# Patient Record
Sex: FEMALE | Race: ASIAN | NOT HISPANIC OR LATINO
[De-identification: names, ages, dates, MRNs, and addresses within clinical notes are randomized per-mention and may not be internally consistent; named-entity substitution may affect disease eponyms.]

---

## 2017-01-03 ENCOUNTER — MESSAGE (OUTPATIENT)
Age: 42
End: 2017-01-03

## 2017-01-03 ENCOUNTER — APPOINTMENT (OUTPATIENT)
Dept: PULMONOLOGY | Facility: CLINIC | Age: 42
End: 2017-01-03

## 2017-01-03 ENCOUNTER — OTHER (OUTPATIENT)
Age: 42
End: 2017-01-03

## 2017-01-03 VITALS
WEIGHT: 96 LBS | DIASTOLIC BLOOD PRESSURE: 70 MMHG | BODY MASS INDEX: 18.85 KG/M2 | HEART RATE: 90 BPM | SYSTOLIC BLOOD PRESSURE: 100 MMHG | TEMPERATURE: 97.5 F | RESPIRATION RATE: 16 BRPM | HEIGHT: 60 IN

## 2017-01-04 ENCOUNTER — APPOINTMENT (OUTPATIENT)
Dept: INTERNAL MEDICINE | Facility: HOSPITAL | Age: 42
End: 2017-01-04

## 2017-01-05 ENCOUNTER — LABORATORY RESULT (OUTPATIENT)
Age: 42
End: 2017-01-05

## 2017-01-05 ENCOUNTER — APPOINTMENT (OUTPATIENT)
Dept: RHEUMATOLOGY | Facility: CLINIC | Age: 42
End: 2017-01-05

## 2017-01-09 ENCOUNTER — RESULT REVIEW (OUTPATIENT)
Age: 42
End: 2017-01-09

## 2017-01-10 ENCOUNTER — APPOINTMENT (OUTPATIENT)
Dept: RHEUMATOLOGY | Facility: CLINIC | Age: 42
End: 2017-01-10

## 2017-01-10 VITALS
OXYGEN SATURATION: 95 % | DIASTOLIC BLOOD PRESSURE: 72 MMHG | SYSTOLIC BLOOD PRESSURE: 115 MMHG | HEART RATE: 79 BPM | BODY MASS INDEX: 18.65 KG/M2 | HEIGHT: 60 IN | TEMPERATURE: 97.5 F | WEIGHT: 95 LBS

## 2017-01-11 ENCOUNTER — APPOINTMENT (OUTPATIENT)
Dept: DERMATOLOGY | Facility: CLINIC | Age: 42
End: 2017-01-11

## 2017-01-18 ENCOUNTER — APPOINTMENT (OUTPATIENT)
Dept: CARDIOLOGY | Facility: CLINIC | Age: 42
End: 2017-01-18

## 2017-01-18 ENCOUNTER — APPOINTMENT (OUTPATIENT)
Dept: PULMONOLOGY | Facility: CLINIC | Age: 42
End: 2017-01-18

## 2017-01-18 VITALS
BODY MASS INDEX: 19.24 KG/M2 | HEIGHT: 60 IN | HEART RATE: 98 BPM | OXYGEN SATURATION: 100 % | WEIGHT: 98 LBS | TEMPERATURE: 97.4 F | DIASTOLIC BLOOD PRESSURE: 69 MMHG | SYSTOLIC BLOOD PRESSURE: 113 MMHG

## 2017-01-22 LAB
25(OH)D3 SERPL-MCNC: 9.4 NG/ML
ALBUMIN SERPL ELPH-MCNC: 4.1 G/DL
ALP BLD-CCNC: 75 U/L
ALT SERPL-CCNC: 40 U/L
ANION GAP SERPL CALC-SCNC: 11 MMOL/L
APPEARANCE: CLEAR
AST SERPL-CCNC: 45 U/L
BACTERIA: ABNORMAL
BASOPHILS # BLD AUTO: 0.03 K/UL
BASOPHILS NFR BLD AUTO: 0.5 %
BILIRUB SERPL-MCNC: 0.5 MG/DL
BILIRUBIN URINE: NEGATIVE
BLOOD URINE: NEGATIVE
BUN SERPL-MCNC: 7 MG/DL
C3 SERPL-MCNC: 92 MG/DL
C4 SERPL-MCNC: 22 MG/DL
CALCIUM SERPL-MCNC: 9 MG/DL
CHLORIDE SERPL-SCNC: 102 MMOL/L
CO2 SERPL-SCNC: 28 MMOL/L
COLOR: YELLOW
CREAT SERPL-MCNC: 0.41 MG/DL
DSDNA AB SER-ACNC: <12 IU/ML
EOSINOPHIL # BLD AUTO: 0.26 K/UL
EOSINOPHIL NFR BLD AUTO: 4 %
ERYTHROCYTE [SEDIMENTATION RATE] IN BLOOD BY WESTERGREN METHOD: 10 MM/HR
GGT SERPL-CCNC: 13 U/L
GLUCOSE QUALITATIVE U: NORMAL MG/DL
GLUCOSE SERPL-MCNC: 88 MG/DL
HCT VFR BLD CALC: 38.5 %
HGB BLD-MCNC: 12.1 G/DL
HYALINE CASTS: 1 /LPF
IMM GRANULOCYTES NFR BLD AUTO: 0.3 %
KETONES URINE: NEGATIVE
LEUKOCYTE ESTERASE URINE: NEGATIVE
LYMPHOCYTES # BLD AUTO: 1.4 K/UL
LYMPHOCYTES NFR BLD AUTO: 21.6 %
MAN DIFF?: NORMAL
MCHC RBC-ENTMCNC: 26.1 PG
MCHC RBC-ENTMCNC: 31.4 GM/DL
MCV RBC AUTO: 83 FL
MICROSCOPIC-UA: NORMAL
MONOCYTES # BLD AUTO: 0.88 K/UL
MONOCYTES NFR BLD AUTO: 13.6 %
NEUTROPHILS # BLD AUTO: 3.88 K/UL
NEUTROPHILS NFR BLD AUTO: 60 %
NITRITE URINE: NEGATIVE
PH URINE: 7
PLATELET # BLD AUTO: 163 K/UL
POTASSIUM SERPL-SCNC: 3.9 MMOL/L
PROT SERPL-MCNC: 7.1 G/DL
PROTEIN URINE: NEGATIVE MG/DL
RBC # BLD: 4.64 M/UL
RBC # FLD: 16.1 %
RED BLOOD CELLS URINE: 4 /HPF
SODIUM SERPL-SCNC: 141 MMOL/L
SPECIFIC GRAVITY URINE: 1.01
SQUAMOUS EPITHELIAL CELLS: 3 /HPF
UROBILINOGEN URINE: NORMAL MG/DL
WBC # FLD AUTO: 6.47 K/UL
WHITE BLOOD CELLS URINE: 1 /HPF

## 2017-01-25 LAB
CK BB SERPL ELPH-CCNC: 0 % (ref 0–?)
CK MB CFR SERPL ELPH: 18 %
CK MM SERPL ELPH-CCNC: 73 %
CREATINE KINASE,TOTAL,SERUM: 496 U/L
MACRO TYPE 1: 9 %
MACRO TYPE 2: 0 %

## 2017-02-02 ENCOUNTER — LABORATORY RESULT (OUTPATIENT)
Age: 42
End: 2017-02-02

## 2017-02-02 ENCOUNTER — APPOINTMENT (OUTPATIENT)
Dept: RHEUMATOLOGY | Facility: CLINIC | Age: 42
End: 2017-02-02

## 2017-02-07 ENCOUNTER — APPOINTMENT (OUTPATIENT)
Dept: INFECTIOUS DISEASE | Facility: CLINIC | Age: 42
End: 2017-02-07

## 2017-02-08 ENCOUNTER — OUTPATIENT (OUTPATIENT)
Dept: OUTPATIENT SERVICES | Facility: HOSPITAL | Age: 42
LOS: 1 days | End: 2017-02-08

## 2017-02-08 ENCOUNTER — APPOINTMENT (OUTPATIENT)
Dept: INTERNAL MEDICINE | Facility: HOSPITAL | Age: 42
End: 2017-02-08

## 2017-02-08 VITALS
DIASTOLIC BLOOD PRESSURE: 63 MMHG | HEART RATE: 92 BPM | WEIGHT: 97 LBS | HEIGHT: 61 IN | BODY MASS INDEX: 18.31 KG/M2 | SYSTOLIC BLOOD PRESSURE: 110 MMHG

## 2017-02-08 VITALS — SYSTOLIC BLOOD PRESSURE: 88 MMHG | DIASTOLIC BLOOD PRESSURE: 58 MMHG

## 2017-02-08 DIAGNOSIS — M25.50 PAIN IN UNSPECIFIED JOINT: ICD-10-CM

## 2017-02-08 DIAGNOSIS — Z98.89 OTHER SPECIFIED POSTPROCEDURAL STATES: Chronic | ICD-10-CM

## 2017-02-09 NOTE — PHYSICAL EXAM
[No Acute Distress] : no acute distress [No Respiratory Distress] : no respiratory distress [Clear to Auscultation] : lungs were clear to auscultation bilaterally [Normal Rate] : heart rate was normal  [Normal S1, S2] : normal S1 and S2 [No Murmurs] : no murmurs heard [No Edema] : there was no peripheral edema

## 2017-02-10 DIAGNOSIS — M34.9 SYSTEMIC SCLEROSIS, UNSPECIFIED: ICD-10-CM

## 2017-02-10 DIAGNOSIS — R76.11 NONSPECIFIC REACTION TO TUBERCULIN SKIN TEST WITHOUT ACTIVE TUBERCULOSIS: ICD-10-CM

## 2017-02-10 DIAGNOSIS — E03.9 HYPOTHYROIDISM, UNSPECIFIED: ICD-10-CM

## 2017-02-10 DIAGNOSIS — R00.2 PALPITATIONS: ICD-10-CM

## 2017-02-10 NOTE — COUNSELING
[Non - Smoker] : non-smoker [FreeTextEntry2] : Advised light activity such as walking for a few minutes at a time.

## 2017-02-10 NOTE — ASSESSMENT
[FreeTextEntry1] : 42 yo female with scleroderma, hypothyroidism and palpitations presents for follow up of palpitations and other active issues in regards to scleroderma and latent tuberculosis.\par \par Palpitations: intermittent persistence of symptoms but denying any loss of consciousness, no chest pains/ pressure at present.  Will continue to monitor, reviewed with pt that at present don't think there is any relationship to her elevated CPK found during hospitalization.  Advised to patient to measure her pulse when palpitations recur so that she can see for herself how fast her heart is beating.  Also advised pt that unless she is symptomatic, such as severe dizziness or chest pains or jaw pain, left arm pain, nausea and vomiting with palpitations at present there is no indication to go to the ER.  Pt suggested she understood.  Will obtain TSH for evaluation of her thyroid function as she is on levothyroxine and if over supplemented could contribute to palpitations.  Will also repeat lipid panel as pt wants to know current status.  Pt unable to obtain labs today so lab requistion given to pt and orders placed on deferral for her to have drawn during her next set of labs with chemo.\par \par Hypothyroidism: recheck tsh, continue current regimen.\par \par Scleroderma: as per Rheum, stable at present.  Although concern in regards to swallowing problems in October, reviewed speech note which revealed that  swallow test done was incomplete and recommendations to possibly follow up with GI for further testing, will discuss this with patient at next visit, as not discussed at this visit.\par \par Latent tuberculosis: recent lfts stabilizing, still slightly elevated, defer to ID for further managemnt.  \par \par Will have pt follow up in a few weeks to touch base.  Pt also concerned about her lack of weight gain, stating that it has stabilized and she is no longer losing weight but she would prefer to gain.  Educated pt in regards to increasing good fats in her diet in form of nuts, avocado and seafood, which pt states she is already doing so.

## 2017-02-10 NOTE — HISTORY OF PRESENT ILLNESS
[Intercurrent Specialty/Sub-specialty Visits] : the patient has intercurrent specialty/sub-specialty visits [de-identified] : Pt is here for follow up  on several issues: palpitations, update on scleroderma and follow up on her hospital admission in October.\par \par Pt states that last night she had an episode of palpitations that was slightly similar to the one she had in October when she was admitted for evaluation of a possible myocarditis.  She is concerned and wonders if she had to go to the ER last night.  SHe is also wondering about her lab results from the Cardiologist and Rheumatologist.\par \par Pt states that in October she had experienced palpitations about 2 days prior to her hospitalization. On day of hospitalization, she was choking on a pill that she was swallowing which caused her to go to ER.  She does admit that she felt chest pressure that she didn't think was the same thing as chest pain mentioned in her discharge summary.  She was admitted to the hospital for further workup.\par \par  She reports that since the hospitalization she has only experiencing the palpitations at first with first 2 episodes of chemotherapy for very brief periods but certainly not the same as before.  Then it seemed to dissipate and did not return until last night when she noticed it came on after her dinner about 10 minutes later. Pt decided to drink some water and went to lay down due to the palpitations.  About 15 minutes later she experienced some heartburn.\par \par No sob, chest tightness or pain, but did feel a little "low" or "lightheaded".  She didn't pass out and the room wasn't spinning around her.  Pt did feel a little nausea.  \par \par Pt feeling better today.  No palpitations today.   [FreeTextEntry3] : Cardiology, Rheumatology, Pulmonary, ID and Center(speech) [de-identified] : At her  Cardiology visit:  Cardiologist noted the following: She is a 41-year-old with scleroderma and recently elevated cardiac enzymes and normal ejection fraction and normal coronary angiogram. Her cardiac MRI showed no evidence of prior infarction or inflammation.  He reports reassuring pt that the muscle enzyme leak is not coming from her heart. He obtained labs for CPK isoenzyme electrophoresis and refilled her nifedipine.  He mentioned that he wanted her to follow up in 6 months for routine cardiac evaluation of her issues, or sooner if symptoms arise.\par \par At Rheum visit: Rheumatologist noted the followin. 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  She is on HCQ and on Cytoxan IV since 2016 ( received 3 doses )\par 2. + Quantiferon - had hepatotoxicity from INH- on hold, and she is to  c/w Nifedipine XR 30 mg\par \par Noted ID's visit advised pt continue to stay off INH for latent TB, and there will be considerations for starting an alternative medication once lfts stabilize, pt has a follow up appt in a month.\par \par \par

## 2017-02-10 NOTE — REVIEW OF SYSTEMS
[Palpitations] : palpitations [Chest Pain] : no chest pain [Lower Ext Edema] : no lower extremity edema [FreeTextEntry5] : as noted in HPI as well.

## 2017-02-15 ENCOUNTER — LABORATORY RESULT (OUTPATIENT)
Age: 42
End: 2017-02-15

## 2017-02-16 LAB
ALBUMIN SERPL ELPH-MCNC: 3.8 G/DL
ALP BLD-CCNC: 65 U/L
ALT SERPL-CCNC: 29 U/L
ANION GAP SERPL CALC-SCNC: 14 MMOL/L
APPEARANCE: CLEAR
AST SERPL-CCNC: 39 U/L
BASOPHILS # BLD AUTO: 0.03 K/UL
BASOPHILS NFR BLD AUTO: 0.4 %
BILIRUB SERPL-MCNC: 0.6 MG/DL
BILIRUBIN URINE: NEGATIVE
BLOOD URINE: ABNORMAL
BUN SERPL-MCNC: 11 MG/DL
CALCIUM SERPL-MCNC: 9.1 MG/DL
CHLORIDE SERPL-SCNC: 100 MMOL/L
CO2 SERPL-SCNC: 23 MMOL/L
COLOR: YELLOW
CREAT SERPL-MCNC: 0.38 MG/DL
EOSINOPHIL # BLD AUTO: 0.24 K/UL
EOSINOPHIL NFR BLD AUTO: 3.5 %
GLUCOSE QUALITATIVE U: NORMAL MG/DL
GLUCOSE SERPL-MCNC: 66 MG/DL
HCT VFR BLD CALC: 40.6 %
HGB BLD-MCNC: 12.8 G/DL
IMM GRANULOCYTES NFR BLD AUTO: 0.4 %
KETONES URINE: NEGATIVE
LEUKOCYTE ESTERASE URINE: NEGATIVE
LYMPHOCYTES # BLD AUTO: 1.69 K/UL
LYMPHOCYTES NFR BLD AUTO: 24.4 %
MAN DIFF?: NORMAL
MCHC RBC-ENTMCNC: 26 PG
MCHC RBC-ENTMCNC: 31.5 GM/DL
MCV RBC AUTO: 82.5 FL
MONOCYTES # BLD AUTO: 0.71 K/UL
MONOCYTES NFR BLD AUTO: 10.2 %
NEUTROPHILS # BLD AUTO: 4.23 K/UL
NEUTROPHILS NFR BLD AUTO: 61.1 %
NITRITE URINE: NEGATIVE
PH URINE: 5.5
PLATELET # BLD AUTO: 179 K/UL
POTASSIUM SERPL-SCNC: 3.8 MMOL/L
PROT SERPL-MCNC: 7.3 G/DL
PROTEIN URINE: NEGATIVE MG/DL
RBC # BLD: 4.92 M/UL
RBC # FLD: 15.8 %
SODIUM SERPL-SCNC: 137 MMOL/L
SPECIFIC GRAVITY URINE: 1.01
UROBILINOGEN URINE: NORMAL MG/DL
WBC # FLD AUTO: 6.93 K/UL

## 2017-03-02 ENCOUNTER — MOBILE ON CALL (OUTPATIENT)
Age: 42
End: 2017-03-02

## 2017-03-02 LAB
ALBUMIN SERPL ELPH-MCNC: 4 G/DL
ALP BLD-CCNC: 82 U/L
ALT SERPL-CCNC: 31 U/L
ANION GAP SERPL CALC-SCNC: 10 MMOL/L
APPEARANCE: CLEAR
AST SERPL-CCNC: 45 U/L
BACTERIA: NEGATIVE
BASOPHILS # BLD AUTO: 0.02 K/UL
BASOPHILS NFR BLD AUTO: 0.3 %
BILIRUB SERPL-MCNC: 0.3 MG/DL
BILIRUBIN URINE: NEGATIVE
BLOOD URINE: ABNORMAL
BUN SERPL-MCNC: 15 MG/DL
CALCIUM SERPL-MCNC: 9.1 MG/DL
CHLORIDE SERPL-SCNC: 103 MMOL/L
CO2 SERPL-SCNC: 26 MMOL/L
COLOR: YELLOW
CREAT SERPL-MCNC: 0.42 MG/DL
CREAT SPEC-SCNC: 60 MG/DL
CREAT/PROT UR: 0.1 RATIO
EOSINOPHIL # BLD AUTO: 0.22 K/UL
EOSINOPHIL NFR BLD AUTO: 3 %
GLUCOSE QUALITATIVE U: NORMAL MG/DL
GLUCOSE SERPL-MCNC: 82 MG/DL
HCT VFR BLD CALC: 38.4 %
HGB BLD-MCNC: 12.3 G/DL
HYALINE CASTS: 5 /LPF
IMM GRANULOCYTES NFR BLD AUTO: 0.3 %
KETONES URINE: NEGATIVE
LEUKOCYTE ESTERASE URINE: NEGATIVE
LYMPHOCYTES # BLD AUTO: 1.71 K/UL
LYMPHOCYTES NFR BLD AUTO: 23.4 %
MAN DIFF?: NORMAL
MCHC RBC-ENTMCNC: 26.3 PG
MCHC RBC-ENTMCNC: 32 GM/DL
MCV RBC AUTO: 82.2 FL
MICROSCOPIC-UA: NORMAL
MONOCYTES # BLD AUTO: 0.91 K/UL
MONOCYTES NFR BLD AUTO: 12.5 %
NEUTROPHILS # BLD AUTO: 4.42 K/UL
NEUTROPHILS NFR BLD AUTO: 60.5 %
NITRITE URINE: NEGATIVE
PH URINE: 5.5
PLATELET # BLD AUTO: 168 K/UL
POTASSIUM SERPL-SCNC: 4.6 MMOL/L
PROT SERPL-MCNC: 7.3 G/DL
PROT UR-MCNC: 8 MG/DL
PROTEIN URINE: NEGATIVE MG/DL
RBC # BLD: 4.67 M/UL
RBC # FLD: 15.7 %
RED BLOOD CELLS URINE: 4 /HPF
SODIUM SERPL-SCNC: 139 MMOL/L
SPECIFIC GRAVITY URINE: 1.02
SQUAMOUS EPITHELIAL CELLS: 8 /HPF
UROBILINOGEN URINE: NORMAL MG/DL
WBC # FLD AUTO: 7.3 K/UL
WHITE BLOOD CELLS URINE: 4 /HPF

## 2017-03-03 ENCOUNTER — OUTPATIENT (OUTPATIENT)
Dept: OUTPATIENT SERVICES | Facility: HOSPITAL | Age: 42
LOS: 1 days | End: 2017-03-03

## 2017-03-03 ENCOUNTER — APPOINTMENT (OUTPATIENT)
Dept: INTERNAL MEDICINE | Facility: HOSPITAL | Age: 42
End: 2017-03-03

## 2017-03-03 VITALS — SYSTOLIC BLOOD PRESSURE: 116 MMHG | HEART RATE: 81 BPM | DIASTOLIC BLOOD PRESSURE: 68 MMHG

## 2017-03-03 VITALS — BODY MASS INDEX: 18.5 KG/M2 | HEIGHT: 61 IN | WEIGHT: 98 LBS

## 2017-03-03 DIAGNOSIS — H11.32 CONJUNCTIVAL HEMORRHAGE, LEFT EYE: ICD-10-CM

## 2017-03-03 DIAGNOSIS — Z98.89 OTHER SPECIFIED POSTPROCEDURAL STATES: Chronic | ICD-10-CM

## 2017-03-06 DIAGNOSIS — L89.90 PRESSURE ULCER OF UNSPECIFIED SITE, UNSPECIFIED STAGE: ICD-10-CM

## 2017-03-06 DIAGNOSIS — H11.32 CONJUNCTIVAL HEMORRHAGE, LEFT EYE: ICD-10-CM

## 2017-03-07 ENCOUNTER — LABORATORY RESULT (OUTPATIENT)
Age: 42
End: 2017-03-07

## 2017-03-07 ENCOUNTER — APPOINTMENT (OUTPATIENT)
Dept: INFECTIOUS DISEASE | Facility: CLINIC | Age: 42
End: 2017-03-07

## 2017-03-07 ENCOUNTER — APPOINTMENT (OUTPATIENT)
Dept: RHEUMATOLOGY | Facility: CLINIC | Age: 42
End: 2017-03-07

## 2017-03-07 ENCOUNTER — OUTPATIENT (OUTPATIENT)
Dept: OUTPATIENT SERVICES | Facility: HOSPITAL | Age: 42
LOS: 1 days | End: 2017-03-07
Payer: COMMERCIAL

## 2017-03-07 VITALS
HEART RATE: 88 BPM | OXYGEN SATURATION: 100 % | TEMPERATURE: 97.9 F | HEIGHT: 61 IN | SYSTOLIC BLOOD PRESSURE: 114 MMHG | BODY MASS INDEX: 18.88 KG/M2 | DIASTOLIC BLOOD PRESSURE: 73 MMHG | WEIGHT: 100 LBS

## 2017-03-07 VITALS
WEIGHT: 99 LBS | BODY MASS INDEX: 18.69 KG/M2 | OXYGEN SATURATION: 98 % | TEMPERATURE: 98.1 F | HEIGHT: 61 IN | HEART RATE: 98 BPM | SYSTOLIC BLOOD PRESSURE: 120 MMHG | DIASTOLIC BLOOD PRESSURE: 77 MMHG

## 2017-03-07 DIAGNOSIS — B97.89 OTHER VIRAL AGENTS AS THE CAUSE OF DISEASES CLASSIFIED ELSEWHERE: ICD-10-CM

## 2017-03-07 DIAGNOSIS — R91.1 SOLITARY PULMONARY NODULE: ICD-10-CM

## 2017-03-07 DIAGNOSIS — I51.89 OTHER ILL-DEFINED HEART DISEASES: ICD-10-CM

## 2017-03-07 DIAGNOSIS — Z98.89 OTHER SPECIFIED POSTPROCEDURAL STATES: Chronic | ICD-10-CM

## 2017-03-07 LAB
ALBUMIN SERPL ELPH-MCNC: 3.9 G/DL — SIGNIFICANT CHANGE UP (ref 3.3–5)
ALP SERPL-CCNC: 75 U/L — SIGNIFICANT CHANGE UP (ref 40–120)
ALT FLD-CCNC: 23 U/L — SIGNIFICANT CHANGE UP (ref 10–45)
ANION GAP SERPL CALC-SCNC: 14 MMOL/L — SIGNIFICANT CHANGE UP (ref 5–17)
AST SERPL-CCNC: 34 U/L — SIGNIFICANT CHANGE UP (ref 10–40)
BILIRUB SERPL-MCNC: 0.4 MG/DL — SIGNIFICANT CHANGE UP (ref 0.2–1.2)
BUN SERPL-MCNC: 10 MG/DL — SIGNIFICANT CHANGE UP (ref 7–23)
CALCIUM SERPL-MCNC: 9.5 MG/DL — SIGNIFICANT CHANGE UP (ref 8.4–10.5)
CHLORIDE SERPL-SCNC: 101 MMOL/L — SIGNIFICANT CHANGE UP (ref 96–108)
CO2 SERPL-SCNC: 23 MMOL/L — SIGNIFICANT CHANGE UP (ref 22–31)
CREAT SERPL-MCNC: 0.39 MG/DL — LOW (ref 0.5–1.3)
GLUCOSE SERPL-MCNC: 78 MG/DL — SIGNIFICANT CHANGE UP (ref 70–99)
HCT VFR BLD CALC: 39.9 % — SIGNIFICANT CHANGE UP (ref 34.5–45)
HGB BLD-MCNC: 12.1 G/DL — SIGNIFICANT CHANGE UP (ref 11.5–15.5)
MCHC RBC-ENTMCNC: 25.3 PG — LOW (ref 27–34)
MCHC RBC-ENTMCNC: 30.3 GM/DL — LOW (ref 32–36)
MCV RBC AUTO: 83.3 FL — SIGNIFICANT CHANGE UP (ref 80–100)
PLATELET # BLD AUTO: 171 K/UL — SIGNIFICANT CHANGE UP (ref 150–400)
POTASSIUM SERPL-MCNC: 4 MMOL/L — SIGNIFICANT CHANGE UP (ref 3.5–5.3)
POTASSIUM SERPL-SCNC: 4 MMOL/L — SIGNIFICANT CHANGE UP (ref 3.5–5.3)
PROT SERPL-MCNC: 7.6 G/DL — SIGNIFICANT CHANGE UP (ref 6–8.3)
RBC # BLD: 4.79 M/UL — SIGNIFICANT CHANGE UP (ref 3.8–5.2)
RBC # FLD: 15.7 % — HIGH (ref 10.3–14.5)
SODIUM SERPL-SCNC: 138 MMOL/L — SIGNIFICANT CHANGE UP (ref 135–145)
WBC # BLD: 6.78 K/UL — SIGNIFICANT CHANGE UP (ref 3.8–10.5)
WBC # FLD AUTO: 6.78 K/UL — SIGNIFICANT CHANGE UP (ref 3.8–10.5)

## 2017-03-07 PROCEDURE — 36415 COLL VENOUS BLD VENIPUNCTURE: CPT

## 2017-03-07 PROCEDURE — G0463: CPT

## 2017-03-07 PROCEDURE — 85027 COMPLETE CBC AUTOMATED: CPT

## 2017-03-07 PROCEDURE — 80053 COMPREHEN METABOLIC PANEL: CPT

## 2017-03-15 DIAGNOSIS — R76.11 NONSPECIFIC REACTION TO TUBERCULIN SKIN TEST WITHOUT ACTIVE TUBERCULOSIS: ICD-10-CM

## 2017-03-22 ENCOUNTER — FORM ENCOUNTER (OUTPATIENT)
Age: 42
End: 2017-03-22

## 2017-03-22 ENCOUNTER — APPOINTMENT (OUTPATIENT)
Dept: INTERNAL MEDICINE | Facility: HOSPITAL | Age: 42
End: 2017-03-22

## 2017-03-22 ENCOUNTER — LABORATORY RESULT (OUTPATIENT)
Age: 42
End: 2017-03-22

## 2017-03-22 ENCOUNTER — OUTPATIENT (OUTPATIENT)
Dept: OUTPATIENT SERVICES | Facility: HOSPITAL | Age: 42
LOS: 1 days | End: 2017-03-22

## 2017-03-22 VITALS
DIASTOLIC BLOOD PRESSURE: 62 MMHG | WEIGHT: 100 LBS | BODY MASS INDEX: 18.88 KG/M2 | HEIGHT: 61 IN | HEART RATE: 89 BPM | SYSTOLIC BLOOD PRESSURE: 112 MMHG

## 2017-03-22 VITALS — SYSTOLIC BLOOD PRESSURE: 88 MMHG | DIASTOLIC BLOOD PRESSURE: 58 MMHG

## 2017-03-22 DIAGNOSIS — Z98.89 OTHER SPECIFIED POSTPROCEDURAL STATES: Chronic | ICD-10-CM

## 2017-03-22 DIAGNOSIS — N92.6 IRREGULAR MENSTRUATION, UNSPECIFIED: ICD-10-CM

## 2017-03-22 DIAGNOSIS — Z87.898 PERSONAL HISTORY OF OTHER SPECIFIED CONDITIONS: ICD-10-CM

## 2017-03-22 LAB
HCG UR QL: NEGATIVE
QUALITY CONTROL: YES
TSH SERPL-MCNC: 0.09 UIU/ML — LOW (ref 0.27–4.2)

## 2017-03-22 NOTE — HISTORY OF PRESENT ILLNESS
[FreeTextEntry1] : Missed menses and Needs referral to Ophthalmology, breast clinic and dermatology, following up on her fall and scleroderma.\par \par  [de-identified] : Pt presents for follow up active issues of a recent fall and scleroderma.  Pt has been doing much better since last visit a few weeks back stating that her bruises are improving markedly.  She reports that if she presses on the corner of her left eyeball she feels a little bit of pain.  The red spot present at last visit is completely gone.  Pt denies any change to her vision and is due for a visit with her Ophthalmology as she missed her previously scheduled ones due to chemotherapy treatment of her scleroderma.\par \par She is also here because she is noticing that her last menses was on 18th of February and yet this month she has yet to get her menses.  She is wondering why that is the case.  She reports that her  is using a condom every time they have intercourse and there has not been any break in it that she is aware.  She denies ever being late with her menses in the past.  She currently has 2 children at home and is concerned about having another pregnancy.  \par \par She is also in need of a referral to the breast clinic as well, due to missing that visit in order to get her chemotherapy, she did not have the chance to follow up for her 6 month repeat mammogram and breast ultrasound as indicated in the mammogram report.  \par \par Otherwise she is doing well for the most part.  Also wonders about her thyroid level, would like to have it checked today.\par \par

## 2017-03-22 NOTE — DATA REVIEWED
[FreeTextEntry1] : Mammogram done in June, 2016: Bi-RAds 3- Probably benign findings, Dense breasts.  Also noted possible hypoechoic and anechoic lesions versus cluster of cystic debris on US of left breast for which follow up left breast us recommended to assess stability..

## 2017-03-22 NOTE — ASSESSMENT
[FreeTextEntry1] : 40 yo female with Scleroderma, Interstitial lung disease, latent tuberculosis and hypothyroidism presents for follow up these issues and looking for referrals.\par \par Bruise s/p fall: resolved, still with a little pain at eye ball, doesn't appear to be bony tenderness, hence will defer on imaging at present.  Refer to Ophthalomolgy for follow up.\par \par Amenorrhea: will obtain UCG result and inform patient of results.  If positive will consider referral to family planning for assistance with next course of action.  Suspect that it is negative as amenorrhea can be secondary to her medication.\par \par Hypothyroidism: will check TSH today and depending on results will adjust appropriately thyroid medicaiton, will inform patient through the [facility] portal, pt agrees with plan.  \par \par Scleroderma: stable on chemotherapy by Rheum.\par \par Abnormal mammogram: reviewed report with patient, noted need for repeat mammogram and breast ultrasound, both ordered today, pt to call to schedule.  Referred  to Breast clinic as well. \par \par Pt to follow up with me in 6 months or sooner, she is encouraged to call office if needs earlier appointment.  \par \par

## 2017-03-22 NOTE — HEALTH RISK ASSESSMENT
[Active tobbaco user] : Patient is not a tobacco user [de-identified] : Rheumatology and Infectious Disease

## 2017-03-23 ENCOUNTER — OUTPATIENT (OUTPATIENT)
Dept: OUTPATIENT SERVICES | Facility: HOSPITAL | Age: 42
LOS: 1 days | End: 2017-03-23
Payer: COMMERCIAL

## 2017-03-23 ENCOUNTER — APPOINTMENT (OUTPATIENT)
Dept: CT IMAGING | Facility: CLINIC | Age: 42
End: 2017-03-23

## 2017-03-23 ENCOUNTER — TRANSCRIPTION ENCOUNTER (OUTPATIENT)
Age: 42
End: 2017-03-23

## 2017-03-23 DIAGNOSIS — M34.9 SYSTEMIC SCLEROSIS, UNSPECIFIED: ICD-10-CM

## 2017-03-23 DIAGNOSIS — Z98.89 OTHER SPECIFIED POSTPROCEDURAL STATES: Chronic | ICD-10-CM

## 2017-03-23 DIAGNOSIS — R92.8 OTHER ABNORMAL AND INCONCLUSIVE FINDINGS ON DIAGNOSTIC IMAGING OF BREAST: ICD-10-CM

## 2017-03-23 DIAGNOSIS — R91.1 SOLITARY PULMONARY NODULE: ICD-10-CM

## 2017-03-23 DIAGNOSIS — N92.6 IRREGULAR MENSTRUATION, UNSPECIFIED: ICD-10-CM

## 2017-03-23 DIAGNOSIS — E03.9 HYPOTHYROIDISM, UNSPECIFIED: ICD-10-CM

## 2017-03-23 DIAGNOSIS — R59.0 LOCALIZED ENLARGED LYMPH NODES: ICD-10-CM

## 2017-03-23 PROCEDURE — 71250 CT THORAX DX C-: CPT

## 2017-03-30 ENCOUNTER — APPOINTMENT (OUTPATIENT)
Dept: RHEUMATOLOGY | Facility: CLINIC | Age: 42
End: 2017-03-30

## 2017-04-03 ENCOUNTER — APPOINTMENT (OUTPATIENT)
Dept: PULMONOLOGY | Facility: CLINIC | Age: 42
End: 2017-04-03

## 2017-04-03 VITALS
SYSTOLIC BLOOD PRESSURE: 104 MMHG | WEIGHT: 102 LBS | HEART RATE: 83 BPM | DIASTOLIC BLOOD PRESSURE: 60 MMHG | TEMPERATURE: 97.8 F | RESPIRATION RATE: 16 BRPM | BODY MASS INDEX: 19.26 KG/M2 | HEIGHT: 61 IN

## 2017-04-14 ENCOUNTER — APPOINTMENT (OUTPATIENT)
Dept: RHEUMATOLOGY | Facility: CLINIC | Age: 42
End: 2017-04-14

## 2017-04-14 VITALS
TEMPERATURE: 98.2 F | BODY MASS INDEX: 19.07 KG/M2 | DIASTOLIC BLOOD PRESSURE: 75 MMHG | HEART RATE: 97 BPM | HEIGHT: 61 IN | SYSTOLIC BLOOD PRESSURE: 112 MMHG | WEIGHT: 101 LBS

## 2017-04-18 LAB
ALBUMIN SERPL ELPH-MCNC: 4 G/DL
ALP BLD-CCNC: 82 U/L
ALT SERPL-CCNC: 32 U/L
ANA PAT FLD IF-IMP: ABNORMAL
ANA PATTERN: NORMAL
ANA SER IF-ACNC: ABNORMAL
ANA TITER: ABNORMAL
ANION GAP SERPL CALC-SCNC: 14 MMOL/L
AST SERPL-CCNC: 46 U/L
BASOPHILS # BLD AUTO: 0.02 K/UL
BASOPHILS NFR BLD AUTO: 0.2 %
BILIRUB SERPL-MCNC: 0.3 MG/DL
BUN SERPL-MCNC: 14 MG/DL
CALCIUM SERPL-MCNC: 9.3 MG/DL
CHLORIDE SERPL-SCNC: 101 MMOL/L
CK SERPL-CCNC: 328 U/L
CO2 SERPL-SCNC: 25 MMOL/L
CREAT SERPL-MCNC: 0.43 MG/DL
CRP SERPL-MCNC: <0.2 MG/DL
ENA JO1 AB SER IA-ACNC: <0.2 AL
EOSINOPHIL # BLD AUTO: 0.19 K/UL
EOSINOPHIL NFR BLD AUTO: 1.6 %
ERYTHROCYTE [SEDIMENTATION RATE] IN BLOOD BY WESTERGREN METHOD: 14 MM/HR
GLUCOSE SERPL-MCNC: 79 MG/DL
HCT VFR BLD CALC: 42.6 %
HGB BLD-MCNC: 13.2 G/DL
IMM GRANULOCYTES NFR BLD AUTO: 0.3 %
LDH SERPL-CCNC: 294 U/L
LYMPHOCYTES # BLD AUTO: 1.77 K/UL
LYMPHOCYTES NFR BLD AUTO: 15 %
MAN DIFF?: NORMAL
MCHC RBC-ENTMCNC: 25.5 PG
MCHC RBC-ENTMCNC: 31 GM/DL
MCV RBC AUTO: 82.4 FL
MONOCYTES # BLD AUTO: 1.35 K/UL
MONOCYTES NFR BLD AUTO: 11.4 %
MYOGLOBIN SERPL-MCNC: 126 NG/ML
NEUTROPHILS # BLD AUTO: 8.45 K/UL
NEUTROPHILS NFR BLD AUTO: 71.5 %
PLATELET # BLD AUTO: 205 K/UL
POTASSIUM SERPL-SCNC: 4.3 MMOL/L
PROT SERPL-MCNC: 7.2 G/DL
RBC # BLD: 5.17 M/UL
RBC # FLD: 15.6 %
SODIUM SERPL-SCNC: 140 MMOL/L
WBC # FLD AUTO: 11.81 K/UL

## 2017-04-21 ENCOUNTER — APPOINTMENT (OUTPATIENT)
Dept: DERMATOLOGY | Facility: HOSPITAL | Age: 42
End: 2017-04-21

## 2017-04-24 ENCOUNTER — OUTPATIENT (OUTPATIENT)
Dept: OUTPATIENT SERVICES | Facility: HOSPITAL | Age: 42
LOS: 1 days | End: 2017-04-24
Payer: COMMERCIAL

## 2017-04-24 ENCOUNTER — APPOINTMENT (OUTPATIENT)
Dept: INFECTIOUS DISEASE | Facility: CLINIC | Age: 42
End: 2017-04-24

## 2017-04-24 VITALS
BODY MASS INDEX: 19.26 KG/M2 | SYSTOLIC BLOOD PRESSURE: 107 MMHG | DIASTOLIC BLOOD PRESSURE: 68 MMHG | WEIGHT: 102 LBS | HEIGHT: 61 IN | OXYGEN SATURATION: 100 % | HEART RATE: 88 BPM | TEMPERATURE: 97.4 F

## 2017-04-24 DIAGNOSIS — Z98.89 OTHER SPECIFIED POSTPROCEDURAL STATES: Chronic | ICD-10-CM

## 2017-04-24 DIAGNOSIS — B97.89 OTHER VIRAL AGENTS AS THE CAUSE OF DISEASES CLASSIFIED ELSEWHERE: ICD-10-CM

## 2017-04-24 PROCEDURE — G0463: CPT

## 2017-04-27 DIAGNOSIS — R76.11 NONSPECIFIC REACTION TO TUBERCULIN SKIN TEST WITHOUT ACTIVE TUBERCULOSIS: ICD-10-CM

## 2017-05-04 ENCOUNTER — APPOINTMENT (OUTPATIENT)
Dept: OPHTHALMOLOGY | Facility: CLINIC | Age: 42
End: 2017-05-04

## 2017-05-09 ENCOUNTER — LABORATORY RESULT (OUTPATIENT)
Age: 42
End: 2017-05-09

## 2017-05-09 ENCOUNTER — APPOINTMENT (OUTPATIENT)
Dept: INFECTIOUS DISEASE | Facility: CLINIC | Age: 42
End: 2017-05-09

## 2017-05-09 ENCOUNTER — OUTPATIENT (OUTPATIENT)
Dept: OUTPATIENT SERVICES | Facility: HOSPITAL | Age: 42
LOS: 1 days | End: 2017-05-09
Payer: COMMERCIAL

## 2017-05-09 VITALS
SYSTOLIC BLOOD PRESSURE: 114 MMHG | TEMPERATURE: 97.5 F | HEART RATE: 89 BPM | BODY MASS INDEX: 19.26 KG/M2 | DIASTOLIC BLOOD PRESSURE: 69 MMHG | WEIGHT: 102 LBS | HEIGHT: 61 IN | OXYGEN SATURATION: 100 %

## 2017-05-09 DIAGNOSIS — Z98.89 OTHER SPECIFIED POSTPROCEDURAL STATES: Chronic | ICD-10-CM

## 2017-05-09 DIAGNOSIS — B97.89 OTHER VIRAL AGENTS AS THE CAUSE OF DISEASES CLASSIFIED ELSEWHERE: ICD-10-CM

## 2017-05-09 LAB
HCT VFR BLD CALC: 44.2 % — SIGNIFICANT CHANGE UP (ref 34.5–45)
HGB BLD-MCNC: 13.6 G/DL — SIGNIFICANT CHANGE UP (ref 11.5–15.5)
MCHC RBC-ENTMCNC: 25.5 PG — LOW (ref 27–34)
MCHC RBC-ENTMCNC: 30.8 GM/DL — LOW (ref 32–36)
MCV RBC AUTO: 82.9 FL — SIGNIFICANT CHANGE UP (ref 80–100)
MISCELLANEOUS TEST: NORMAL
PLATELET # BLD AUTO: 187 K/UL — SIGNIFICANT CHANGE UP (ref 150–400)
PROC NAME: NORMAL
RBC # BLD: 5.33 M/UL — HIGH (ref 3.8–5.2)
RBC # FLD: 15.7 % — HIGH (ref 10.3–14.5)
WBC # BLD: 7.7 K/UL — SIGNIFICANT CHANGE UP (ref 3.8–10.5)
WBC # FLD AUTO: 7.7 K/UL — SIGNIFICANT CHANGE UP (ref 3.8–10.5)

## 2017-05-09 PROCEDURE — 85027 COMPLETE CBC AUTOMATED: CPT

## 2017-05-09 PROCEDURE — 84443 ASSAY THYROID STIM HORMONE: CPT

## 2017-05-09 PROCEDURE — G0463: CPT

## 2017-05-09 PROCEDURE — 80053 COMPREHEN METABOLIC PANEL: CPT

## 2017-05-10 ENCOUNTER — APPOINTMENT (OUTPATIENT)
Dept: DERMATOLOGY | Facility: CLINIC | Age: 42
End: 2017-05-10

## 2017-05-10 VITALS — DIASTOLIC BLOOD PRESSURE: 70 MMHG | SYSTOLIC BLOOD PRESSURE: 120 MMHG

## 2017-05-10 DIAGNOSIS — L30.9 DERMATITIS, UNSPECIFIED: ICD-10-CM

## 2017-05-10 DIAGNOSIS — R76.11 NONSPECIFIC REACTION TO TUBERCULIN SKIN TEST WITHOUT ACTIVE TUBERCULOSIS: ICD-10-CM

## 2017-05-10 LAB
ALBUMIN SERPL ELPH-MCNC: 4 G/DL — SIGNIFICANT CHANGE UP (ref 3.3–5)
ALP SERPL-CCNC: 77 U/L — SIGNIFICANT CHANGE UP (ref 40–120)
ALT FLD-CCNC: 25 U/L — SIGNIFICANT CHANGE UP (ref 10–45)
ANION GAP SERPL CALC-SCNC: 17 MMOL/L — SIGNIFICANT CHANGE UP (ref 5–17)
AST SERPL-CCNC: 47 U/L — HIGH (ref 10–40)
BILIRUB SERPL-MCNC: 0.4 MG/DL — SIGNIFICANT CHANGE UP (ref 0.2–1.2)
BUN SERPL-MCNC: 9 MG/DL — SIGNIFICANT CHANGE UP (ref 7–23)
CALCIUM SERPL-MCNC: 9.3 MG/DL — SIGNIFICANT CHANGE UP (ref 8.4–10.5)
CHLORIDE SERPL-SCNC: 100 MMOL/L — SIGNIFICANT CHANGE UP (ref 96–108)
CO2 SERPL-SCNC: 22 MMOL/L — SIGNIFICANT CHANGE UP (ref 22–31)
CREAT SERPL-MCNC: 0.46 MG/DL — LOW (ref 0.5–1.3)
GLUCOSE SERPL-MCNC: 71 MG/DL — SIGNIFICANT CHANGE UP (ref 70–99)
POTASSIUM SERPL-MCNC: 4.5 MMOL/L — SIGNIFICANT CHANGE UP (ref 3.5–5.3)
POTASSIUM SERPL-SCNC: 4.5 MMOL/L — SIGNIFICANT CHANGE UP (ref 3.5–5.3)
PROT SERPL-MCNC: 7.6 G/DL — SIGNIFICANT CHANGE UP (ref 6–8.3)
SODIUM SERPL-SCNC: 139 MMOL/L — SIGNIFICANT CHANGE UP (ref 135–145)
TSH SERPL-MCNC: 3.92 UIU/ML — SIGNIFICANT CHANGE UP (ref 0.27–4.2)

## 2017-05-11 ENCOUNTER — APPOINTMENT (OUTPATIENT)
Dept: OPHTHALMOLOGY | Facility: CLINIC | Age: 42
End: 2017-05-11

## 2017-05-12 ENCOUNTER — RX RENEWAL (OUTPATIENT)
Age: 42
End: 2017-05-12

## 2017-05-12 ENCOUNTER — APPOINTMENT (OUTPATIENT)
Dept: RHEUMATOLOGY | Facility: CLINIC | Age: 42
End: 2017-05-12

## 2017-05-12 ENCOUNTER — TRANSCRIPTION ENCOUNTER (OUTPATIENT)
Age: 42
End: 2017-05-12

## 2017-05-12 VITALS
DIASTOLIC BLOOD PRESSURE: 74 MMHG | HEIGHT: 61 IN | TEMPERATURE: 97.7 F | BODY MASS INDEX: 18.88 KG/M2 | WEIGHT: 100 LBS | HEART RATE: 84 BPM | SYSTOLIC BLOOD PRESSURE: 109 MMHG

## 2017-06-09 LAB
25(OH)D3 SERPL-MCNC: 27.1 NG/ML
ALBUMIN SERPL ELPH-MCNC: 4.2 G/DL
ALP BLD-CCNC: 76 U/L
ALT SERPL-CCNC: 27 U/L
ANION GAP SERPL CALC-SCNC: 14 MMOL/L
APPEARANCE: CLEAR
AST SERPL-CCNC: 45 U/L
BACTERIA: ABNORMAL
BASOPHILS # BLD AUTO: 0.02 K/UL
BASOPHILS NFR BLD AUTO: 0.3 %
BILIRUB SERPL-MCNC: 0.2 MG/DL
BILIRUBIN URINE: NEGATIVE
BLOOD URINE: NEGATIVE
BUN SERPL-MCNC: 12 MG/DL
CALCIUM SERPL-MCNC: 9.1 MG/DL
CHLORIDE SERPL-SCNC: 99 MMOL/L
CK SERPL-CCNC: 599 U/L
CO2 SERPL-SCNC: 25 MMOL/L
COLOR: YELLOW
CREAT SERPL-MCNC: 0.48 MG/DL
CREAT SPEC-SCNC: 46 MG/DL
CREAT/PROT UR: 0.2 RATIO
CRP SERPL-MCNC: <0.2 MG/DL
EOSINOPHIL # BLD AUTO: 0.21 K/UL
EOSINOPHIL NFR BLD AUTO: 2.9 %
ERYTHROCYTE [SEDIMENTATION RATE] IN BLOOD BY WESTERGREN METHOD: 15 MM/HR
GGT SERPL-CCNC: 15 U/L
GLUCOSE QUALITATIVE U: NORMAL MG/DL
GLUCOSE SERPL-MCNC: 109 MG/DL
HCT VFR BLD CALC: 39.1 %
HGB BLD-MCNC: 12.4 G/DL
HYALINE CASTS: 2 /LPF
IMM GRANULOCYTES NFR BLD AUTO: 0.3 %
KETONES URINE: NEGATIVE
LDH SERPL-CCNC: 378 U/L
LEUKOCYTE ESTERASE URINE: NEGATIVE
LYMPHOCYTES # BLD AUTO: 2.02 K/UL
LYMPHOCYTES NFR BLD AUTO: 27.6 %
MAN DIFF?: NORMAL
MCHC RBC-ENTMCNC: 25.9 PG
MCHC RBC-ENTMCNC: 31.7 GM/DL
MCV RBC AUTO: 81.8 FL
MICROSCOPIC-UA: NORMAL
MONOCYTES # BLD AUTO: 0.82 K/UL
MONOCYTES NFR BLD AUTO: 11.2 %
MYOGLOBIN SERPL-MCNC: 151 NG/ML
NEUTROPHILS # BLD AUTO: 4.24 K/UL
NEUTROPHILS NFR BLD AUTO: 57.7 %
NITRITE URINE: NEGATIVE
PH URINE: 6.5
PLATELET # BLD AUTO: 173 K/UL
POTASSIUM SERPL-SCNC: 4.1 MMOL/L
PROT SERPL-MCNC: 7.8 G/DL
PROT UR-MCNC: 7 MG/DL
PROTEIN URINE: NEGATIVE MG/DL
RBC # BLD: 4.78 M/UL
RBC # FLD: 15.6 %
RED BLOOD CELLS URINE: 6 /HPF
SODIUM SERPL-SCNC: 138 MMOL/L
SPECIFIC GRAVITY URINE: 1.02
SQUAMOUS EPITHELIAL CELLS: 5 /HPF
UROBILINOGEN URINE: NORMAL MG/DL
WBC # FLD AUTO: 7.33 K/UL
WHITE BLOOD CELLS URINE: 5 /HPF

## 2017-06-14 ENCOUNTER — APPOINTMENT (OUTPATIENT)
Dept: RHEUMATOLOGY | Facility: CLINIC | Age: 42
End: 2017-06-14

## 2017-06-14 VITALS
WEIGHT: 104 LBS | OXYGEN SATURATION: 95 % | SYSTOLIC BLOOD PRESSURE: 115 MMHG | BODY MASS INDEX: 19.63 KG/M2 | HEIGHT: 61 IN | TEMPERATURE: 97.2 F | HEART RATE: 99 BPM | DIASTOLIC BLOOD PRESSURE: 78 MMHG

## 2017-06-16 ENCOUNTER — APPOINTMENT (OUTPATIENT)
Dept: INFECTIOUS DISEASE | Facility: CLINIC | Age: 42
End: 2017-06-16

## 2017-06-16 ENCOUNTER — OUTPATIENT (OUTPATIENT)
Dept: OUTPATIENT SERVICES | Facility: HOSPITAL | Age: 42
LOS: 1 days | End: 2017-06-16
Payer: COMMERCIAL

## 2017-06-16 VITALS
DIASTOLIC BLOOD PRESSURE: 81 MMHG | HEIGHT: 61 IN | BODY MASS INDEX: 19.63 KG/M2 | WEIGHT: 104 LBS | SYSTOLIC BLOOD PRESSURE: 117 MMHG | HEART RATE: 95 BPM | TEMPERATURE: 97.9 F | OXYGEN SATURATION: 100 % | RESPIRATION RATE: 15 BRPM

## 2017-06-16 DIAGNOSIS — Z98.89 OTHER SPECIFIED POSTPROCEDURAL STATES: Chronic | ICD-10-CM

## 2017-06-16 DIAGNOSIS — B97.89 OTHER VIRAL AGENTS AS THE CAUSE OF DISEASES CLASSIFIED ELSEWHERE: ICD-10-CM

## 2017-06-16 DIAGNOSIS — R76.11 NONSPECIFIC REACTION TO TUBERCULIN SKIN TEST WITHOUT ACTIVE TUBERCULOSIS: ICD-10-CM

## 2017-06-16 PROCEDURE — G0463: CPT

## 2017-06-29 ENCOUNTER — FORM ENCOUNTER (OUTPATIENT)
Age: 42
End: 2017-06-29

## 2017-06-30 ENCOUNTER — OUTPATIENT (OUTPATIENT)
Dept: OUTPATIENT SERVICES | Facility: HOSPITAL | Age: 42
LOS: 1 days | End: 2017-06-30
Payer: COMMERCIAL

## 2017-06-30 ENCOUNTER — APPOINTMENT (OUTPATIENT)
Dept: ULTRASOUND IMAGING | Facility: IMAGING CENTER | Age: 42
End: 2017-06-30

## 2017-06-30 ENCOUNTER — APPOINTMENT (OUTPATIENT)
Dept: MAMMOGRAPHY | Facility: IMAGING CENTER | Age: 42
End: 2017-06-30

## 2017-06-30 DIAGNOSIS — R92.8 OTHER ABNORMAL AND INCONCLUSIVE FINDINGS ON DIAGNOSTIC IMAGING OF BREAST: ICD-10-CM

## 2017-06-30 DIAGNOSIS — Z98.89 OTHER SPECIFIED POSTPROCEDURAL STATES: Chronic | ICD-10-CM

## 2017-06-30 PROCEDURE — 77066 DX MAMMO INCL CAD BI: CPT

## 2017-06-30 PROCEDURE — 76642 ULTRASOUND BREAST LIMITED: CPT

## 2017-06-30 PROCEDURE — G0279: CPT

## 2017-07-14 ENCOUNTER — RX RENEWAL (OUTPATIENT)
Age: 42
End: 2017-07-14

## 2017-07-17 ENCOUNTER — MEDICATION RENEWAL (OUTPATIENT)
Age: 42
End: 2017-07-17

## 2017-07-20 ENCOUNTER — CLINICAL ADVICE (OUTPATIENT)
Age: 42
End: 2017-07-20

## 2017-07-24 ENCOUNTER — OUTPATIENT (OUTPATIENT)
Dept: OUTPATIENT SERVICES | Facility: HOSPITAL | Age: 42
LOS: 1 days | End: 2017-07-24

## 2017-07-24 ENCOUNTER — APPOINTMENT (OUTPATIENT)
Dept: RHEUMATOLOGY | Facility: HOSPITAL | Age: 42
End: 2017-07-24

## 2017-07-24 VITALS
SYSTOLIC BLOOD PRESSURE: 114 MMHG | HEIGHT: 61 IN | WEIGHT: 106 LBS | DIASTOLIC BLOOD PRESSURE: 71 MMHG | BODY MASS INDEX: 20.01 KG/M2 | HEART RATE: 83 BPM

## 2017-07-24 DIAGNOSIS — Z98.89 OTHER SPECIFIED POSTPROCEDURAL STATES: Chronic | ICD-10-CM

## 2017-07-25 DIAGNOSIS — J84.9 INTERSTITIAL PULMONARY DISEASE, UNSPECIFIED: ICD-10-CM

## 2017-07-25 DIAGNOSIS — R68.84 JAW PAIN: ICD-10-CM

## 2017-07-25 DIAGNOSIS — M34.9 SYSTEMIC SCLEROSIS, UNSPECIFIED: ICD-10-CM

## 2017-07-25 DIAGNOSIS — Z79.899 OTHER LONG TERM (CURRENT) DRUG THERAPY: ICD-10-CM

## 2017-07-28 ENCOUNTER — NON-APPOINTMENT (OUTPATIENT)
Age: 42
End: 2017-07-28

## 2017-07-28 ENCOUNTER — APPOINTMENT (OUTPATIENT)
Dept: CARDIOLOGY | Facility: CLINIC | Age: 42
End: 2017-07-28
Payer: COMMERCIAL

## 2017-07-28 VITALS
BODY MASS INDEX: 20.03 KG/M2 | HEART RATE: 76 BPM | DIASTOLIC BLOOD PRESSURE: 70 MMHG | OXYGEN SATURATION: 100 % | WEIGHT: 106 LBS | SYSTOLIC BLOOD PRESSURE: 118 MMHG

## 2017-07-28 PROCEDURE — 99215 OFFICE O/P EST HI 40 MIN: CPT

## 2017-07-28 PROCEDURE — 93000 ELECTROCARDIOGRAM COMPLETE: CPT

## 2017-08-08 ENCOUNTER — APPOINTMENT (OUTPATIENT)
Dept: PULMONOLOGY | Facility: CLINIC | Age: 42
End: 2017-08-08
Payer: COMMERCIAL

## 2017-08-08 VITALS
HEIGHT: 61 IN | BODY MASS INDEX: 20.01 KG/M2 | TEMPERATURE: 97.9 F | RESPIRATION RATE: 18 BRPM | OXYGEN SATURATION: 87 % | WEIGHT: 106 LBS | SYSTOLIC BLOOD PRESSURE: 100 MMHG | DIASTOLIC BLOOD PRESSURE: 60 MMHG | HEART RATE: 82 BPM

## 2017-08-08 PROCEDURE — ZZZZZ: CPT

## 2017-08-08 PROCEDURE — 94729 DIFFUSING CAPACITY: CPT

## 2017-08-08 PROCEDURE — 99214 OFFICE O/P EST MOD 30 MIN: CPT | Mod: 25

## 2017-08-08 PROCEDURE — 94726 PLETHYSMOGRAPHY LUNG VOLUMES: CPT

## 2017-08-08 PROCEDURE — 94010 BREATHING CAPACITY TEST: CPT

## 2017-08-14 LAB
25(OH)D3 SERPL-MCNC: 23.9 NG/ML
ALBUMIN SERPL ELPH-MCNC: 4.1 G/DL
ALP BLD-CCNC: 74 U/L
ALT SERPL-CCNC: 20 U/L
ANION GAP SERPL CALC-SCNC: 14 MMOL/L
APPEARANCE: CLEAR
AST SERPL-CCNC: 24 U/L
BACTERIA: ABNORMAL
BASOPHILS # BLD AUTO: 0.03 K/UL
BASOPHILS NFR BLD AUTO: 0.5 %
BILIRUB SERPL-MCNC: 0.6 MG/DL
BILIRUBIN URINE: NEGATIVE
BLOOD URINE: NEGATIVE
BUN SERPL-MCNC: 9 MG/DL
CALCIUM SERPL-MCNC: 9.5 MG/DL
CHLORIDE SERPL-SCNC: 101 MMOL/L
CK SERPL-CCNC: 297 U/L
CO2 SERPL-SCNC: 25 MMOL/L
COLOR: YELLOW
CREAT SERPL-MCNC: 0.56 MG/DL
EOSINOPHIL # BLD AUTO: 0.21 K/UL
EOSINOPHIL NFR BLD AUTO: 3.4 %
GLUCOSE QUALITATIVE U: NORMAL MG/DL
GLUCOSE SERPL-MCNC: 88 MG/DL
HCT VFR BLD CALC: 42.8 %
HGB BLD-MCNC: 13.6 G/DL
HYALINE CASTS: 0 /LPF
IMM GRANULOCYTES NFR BLD AUTO: 0.3 %
KETONES URINE: NEGATIVE
LDH SERPL-CCNC: 256 U/L
LEUKOCYTE ESTERASE URINE: NEGATIVE
LYMPHOCYTES # BLD AUTO: 2.11 K/UL
LYMPHOCYTES NFR BLD AUTO: 33.9 %
MAN DIFF?: NORMAL
MCHC RBC-ENTMCNC: 26.3 PG
MCHC RBC-ENTMCNC: 31.8 GM/DL
MCV RBC AUTO: 82.6 FL
MICROSCOPIC-UA: NORMAL
MONOCYTES # BLD AUTO: 0.73 K/UL
MONOCYTES NFR BLD AUTO: 11.7 %
NEUTROPHILS # BLD AUTO: 3.13 K/UL
NEUTROPHILS NFR BLD AUTO: 50.2 %
NITRITE URINE: NEGATIVE
PH URINE: 6.5
PLATELET # BLD AUTO: 166 K/UL
POTASSIUM SERPL-SCNC: 4.3 MMOL/L
PROT SERPL-MCNC: 7.5 G/DL
PROTEIN URINE: NEGATIVE MG/DL
RBC # BLD: 5.18 M/UL
RBC # FLD: 15.7 %
RED BLOOD CELLS URINE: 6 /HPF
SODIUM SERPL-SCNC: 140 MMOL/L
SPECIFIC GRAVITY URINE: 1.01
SQUAMOUS EPITHELIAL CELLS: 14 /HPF
UROBILINOGEN URINE: NORMAL MG/DL
WBC # FLD AUTO: 6.23 K/UL
WHITE BLOOD CELLS URINE: 1 /HPF

## 2017-08-15 ENCOUNTER — APPOINTMENT (OUTPATIENT)
Dept: RHEUMATOLOGY | Facility: CLINIC | Age: 42
End: 2017-08-15
Payer: COMMERCIAL

## 2017-08-15 VITALS
HEIGHT: 61 IN | DIASTOLIC BLOOD PRESSURE: 74 MMHG | TEMPERATURE: 98.1 F | OXYGEN SATURATION: 99 % | BODY MASS INDEX: 20.01 KG/M2 | SYSTOLIC BLOOD PRESSURE: 109 MMHG | HEART RATE: 91 BPM | WEIGHT: 106 LBS

## 2017-08-15 PROCEDURE — 99215 OFFICE O/P EST HI 40 MIN: CPT

## 2017-08-19 ENCOUNTER — RX RENEWAL (OUTPATIENT)
Age: 42
End: 2017-08-19

## 2017-08-28 ENCOUNTER — APPOINTMENT (OUTPATIENT)
Dept: ORTHOPEDIC SURGERY | Facility: CLINIC | Age: 42
End: 2017-08-28
Payer: COMMERCIAL

## 2017-08-28 VITALS
BODY MASS INDEX: 20.01 KG/M2 | HEART RATE: 80 BPM | WEIGHT: 106 LBS | HEIGHT: 61 IN | SYSTOLIC BLOOD PRESSURE: 114 MMHG | DIASTOLIC BLOOD PRESSURE: 80 MMHG

## 2017-08-28 PROCEDURE — 72040 X-RAY EXAM NECK SPINE 2-3 VW: CPT

## 2017-08-28 PROCEDURE — 73030 X-RAY EXAM OF SHOULDER: CPT | Mod: RT

## 2017-08-28 PROCEDURE — 99203 OFFICE O/P NEW LOW 30 MIN: CPT

## 2017-09-11 ENCOUNTER — APPOINTMENT (OUTPATIENT)
Dept: INFECTIOUS DISEASE | Facility: CLINIC | Age: 42
End: 2017-09-11
Payer: COMMERCIAL

## 2017-09-11 VITALS
TEMPERATURE: 97.9 F | BODY MASS INDEX: 19.84 KG/M2 | WEIGHT: 105 LBS | HEART RATE: 88 BPM | OXYGEN SATURATION: 100 % | SYSTOLIC BLOOD PRESSURE: 111 MMHG | RESPIRATION RATE: 18 BRPM | DIASTOLIC BLOOD PRESSURE: 77 MMHG

## 2017-09-11 PROCEDURE — 99213 OFFICE O/P EST LOW 20 MIN: CPT

## 2017-09-13 ENCOUNTER — RX RENEWAL (OUTPATIENT)
Age: 42
End: 2017-09-13

## 2017-09-15 ENCOUNTER — APPOINTMENT (OUTPATIENT)
Dept: RHEUMATOLOGY | Facility: CLINIC | Age: 42
End: 2017-09-15
Payer: COMMERCIAL

## 2017-09-15 ENCOUNTER — MESSAGE (OUTPATIENT)
Age: 42
End: 2017-09-15

## 2017-09-15 VITALS
BODY MASS INDEX: 19.63 KG/M2 | WEIGHT: 104 LBS | HEART RATE: 73 BPM | TEMPERATURE: 97.8 F | SYSTOLIC BLOOD PRESSURE: 113 MMHG | OXYGEN SATURATION: 95 % | HEIGHT: 61 IN | DIASTOLIC BLOOD PRESSURE: 79 MMHG

## 2017-09-15 LAB
ALBUMIN SERPL ELPH-MCNC: 4.3 G/DL
ALP BLD-CCNC: 67 U/L
ALT SERPL-CCNC: 18 U/L
ANION GAP SERPL CALC-SCNC: 13 MMOL/L
AST SERPL-CCNC: 30 U/L
BASOPHILS # BLD AUTO: 0.02 K/UL
BASOPHILS NFR BLD AUTO: 0.3 %
BILIRUB SERPL-MCNC: 0.3 MG/DL
BUN SERPL-MCNC: 9 MG/DL
CALCIUM SERPL-MCNC: 9.6 MG/DL
CHLORIDE SERPL-SCNC: 102 MMOL/L
CO2 SERPL-SCNC: 26 MMOL/L
CREAT SERPL-MCNC: 0.61 MG/DL
CREAT SPEC-SCNC: 13 MG/DL
CREAT/PROT UR: NORMAL
CRP SERPL-MCNC: <0.2 MG/DL
EOSINOPHIL # BLD AUTO: 0.22 K/UL
EOSINOPHIL NFR BLD AUTO: 3.5 %
ERYTHROCYTE [SEDIMENTATION RATE] IN BLOOD BY WESTERGREN METHOD: 9 MM/HR
GLUCOSE SERPL-MCNC: 92 MG/DL
HCT VFR BLD CALC: 41.1 %
HGB BLD-MCNC: 12.8 G/DL
IMM GRANULOCYTES NFR BLD AUTO: 0.2 %
LYMPHOCYTES # BLD AUTO: 2.1 K/UL
LYMPHOCYTES NFR BLD AUTO: 33.7 %
MAN DIFF?: NORMAL
MCHC RBC-ENTMCNC: 25.7 PG
MCHC RBC-ENTMCNC: 31.1 GM/DL
MCV RBC AUTO: 82.5 FL
MONOCYTES # BLD AUTO: 0.6 K/UL
MONOCYTES NFR BLD AUTO: 9.6 %
NEUTROPHILS # BLD AUTO: 3.29 K/UL
NEUTROPHILS NFR BLD AUTO: 52.7 %
PLATELET # BLD AUTO: 169 K/UL
POTASSIUM SERPL-SCNC: 4.2 MMOL/L
PROT SERPL-MCNC: 7.7 G/DL
PROT UR-MCNC: <4 MG/DL
RBC # BLD: 4.98 M/UL
RBC # FLD: 15 %
SODIUM SERPL-SCNC: 141 MMOL/L
WBC # FLD AUTO: 6.24 K/UL

## 2017-09-15 PROCEDURE — 99215 OFFICE O/P EST HI 40 MIN: CPT

## 2017-09-15 RX ORDER — RIFAMPIN 300 MG/1
300 CAPSULE ORAL
Qty: 60 | Refills: 3 | Status: DISCONTINUED | COMMUNITY
Start: 2017-04-24 | End: 2017-09-15

## 2017-09-18 ENCOUNTER — FORM ENCOUNTER (OUTPATIENT)
Age: 42
End: 2017-09-18

## 2017-09-18 LAB
APPEARANCE: CLEAR
BACTERIA: NEGATIVE
BILIRUBIN URINE: NEGATIVE
BLOOD URINE: NEGATIVE
COLOR: YELLOW
GLUCOSE QUALITATIVE U: NORMAL MG/DL
HYALINE CASTS: 2 /LPF
KETONES URINE: NEGATIVE
LEUKOCYTE ESTERASE URINE: NEGATIVE
MICROSCOPIC-UA: NORMAL
NITRITE URINE: NEGATIVE
PH URINE: 7.5
PROTEIN URINE: NEGATIVE MG/DL
RED BLOOD CELLS URINE: 2 /HPF
SPECIFIC GRAVITY URINE: 1.01
SQUAMOUS EPITHELIAL CELLS: 5 /HPF
UROBILINOGEN URINE: NORMAL MG/DL
WHITE BLOOD CELLS URINE: 0 /HPF

## 2017-09-19 ENCOUNTER — APPOINTMENT (OUTPATIENT)
Dept: MRI IMAGING | Facility: CLINIC | Age: 42
End: 2017-09-19
Payer: COMMERCIAL

## 2017-09-19 ENCOUNTER — OUTPATIENT (OUTPATIENT)
Dept: OUTPATIENT SERVICES | Facility: HOSPITAL | Age: 42
LOS: 1 days | End: 2017-09-19
Payer: COMMERCIAL

## 2017-09-19 DIAGNOSIS — Z98.89 OTHER SPECIFIED POSTPROCEDURAL STATES: Chronic | ICD-10-CM

## 2017-09-19 DIAGNOSIS — G56.80 OTHER SPECIFIED MONONEUROPATHIES OF UNSPECIFIED UPPER LIMB: ICD-10-CM

## 2017-09-19 DIAGNOSIS — M25.511 PAIN IN RIGHT SHOULDER: ICD-10-CM

## 2017-09-19 PROCEDURE — 73221 MRI JOINT UPR EXTREM W/O DYE: CPT

## 2017-09-19 PROCEDURE — 73221 MRI JOINT UPR EXTREM W/O DYE: CPT | Mod: 26,RT

## 2017-09-20 ENCOUNTER — OUTPATIENT (OUTPATIENT)
Dept: OUTPATIENT SERVICES | Facility: HOSPITAL | Age: 42
LOS: 1 days | End: 2017-09-20

## 2017-09-20 ENCOUNTER — APPOINTMENT (OUTPATIENT)
Dept: INTERNAL MEDICINE | Facility: HOSPITAL | Age: 42
End: 2017-09-20
Payer: COMMERCIAL

## 2017-09-20 VITALS
HEIGHT: 61 IN | WEIGHT: 105 LBS | HEART RATE: 95 BPM | DIASTOLIC BLOOD PRESSURE: 69 MMHG | BODY MASS INDEX: 19.83 KG/M2 | SYSTOLIC BLOOD PRESSURE: 118 MMHG

## 2017-09-20 DIAGNOSIS — Z87.19 PERSONAL HISTORY OF OTHER DISEASES OF THE DIGESTIVE SYSTEM: ICD-10-CM

## 2017-09-20 DIAGNOSIS — Z98.89 OTHER SPECIFIED POSTPROCEDURAL STATES: Chronic | ICD-10-CM

## 2017-09-20 PROCEDURE — 99214 OFFICE O/P EST MOD 30 MIN: CPT

## 2017-09-20 NOTE — PHYSICAL EXAM
[No Acute Distress] : no acute distress [Normal Voice/Communication] : normal voice/communication [No Respiratory Distress] : no respiratory distress  [Clear to Auscultation] : lungs were clear to auscultation bilaterally

## 2017-09-21 NOTE — HISTORY OF PRESENT ILLNESS
[Other: ___] : [unfilled]: [Patient was last seen on ___] : Patient was last seen on [unfilled] [FreeTextEntry6] : Patient has since seen Rheumatology, Infectious disease, Cardiology, Pulmonologist and Orthopedic physicians.  She continues to take her medications for her scleroderma without any issues, stating that she feels her disease state appears to be in better control.  She denies any shortness of breath or difficulty breathing.  She had last seen her pulmonologist for Interstitial lung disease in August and was told to follow up in about 8 months which she will schedule later.  She also states that the palpitations she was having is now resolved.  She last saw the Cardiologist in July who had stated that the elevated  creatinine kinase levels were not secondary to her heart  which was found to be normal on MRI without evidence of inflammation or prior infarction.  He suggested following up in a year for an annual check up or sooner if symptoms were to arise.  She had also seen the Infectious Disease specialist who was monitoring her while on treatment for latent tuberculosis for which she had completed 4 months of Rifampin in August, hence has no need to return for a follow up visit.  \par \par Recently her concerns has been focused on the limited range of motion in her right shoulder, she has had pain in that shoulder for about 2 years with limited range of motion.  She has undergone physical therapy without much relief.  She was noted to have a winged right scapula for which she was referred to a Neurologist for further evaluation, she is requesting a referral to be approved by me today.  \par \par In regards to her Scleroderma, she continues to see Rheumatology her last visit being a few days ago, she continues on her medications as per med list, she is to follow up in 6 weeks with Rheumatology, she intends to call Dr. Ross's office to schedule.

## 2017-09-21 NOTE — COUNSELING
[None] : None [Good understanding] : Patient has a good understanding of lifestyle changes and the steps needed to achieve self management goals [de-identified] : P [de-identified] : Patient intends to continue with healthy eating and to exercise.  Encouraged exercising about 20 minutes daily by walking.

## 2017-09-21 NOTE — HEALTH RISK ASSESSMENT
[0] : 2) Feeling down, depressed, or hopeless: Not at all [PHQ-2 Score ___] : PHQ-2 Score [unfilled] [Active tobbaco user] : Patient is not a tobacco user [de-identified] : see HPI above

## 2017-09-22 DIAGNOSIS — Z23 ENCOUNTER FOR IMMUNIZATION: ICD-10-CM

## 2017-09-22 DIAGNOSIS — M34.9 SYSTEMIC SCLEROSIS, UNSPECIFIED: ICD-10-CM

## 2017-09-22 DIAGNOSIS — R74.8 ABNORMAL LEVELS OF OTHER SERUM ENZYMES: ICD-10-CM

## 2017-09-22 DIAGNOSIS — E55.9 VITAMIN D DEFICIENCY, UNSPECIFIED: ICD-10-CM

## 2017-09-22 DIAGNOSIS — J84.9 INTERSTITIAL PULMONARY DISEASE, UNSPECIFIED: ICD-10-CM

## 2017-09-22 DIAGNOSIS — M95.8 OTHER SPECIFIED ACQUIRED DEFORMITIES OF MUSCULOSKELETAL SYSTEM: ICD-10-CM

## 2017-10-10 ENCOUNTER — APPOINTMENT (OUTPATIENT)
Dept: OBGYN | Facility: HOSPITAL | Age: 42
End: 2017-10-10

## 2017-10-19 ENCOUNTER — APPOINTMENT (OUTPATIENT)
Dept: NEUROLOGY | Facility: CLINIC | Age: 42
End: 2017-10-19
Payer: COMMERCIAL

## 2017-10-19 VITALS
WEIGHT: 105 LBS | HEART RATE: 85 BPM | HEIGHT: 61 IN | DIASTOLIC BLOOD PRESSURE: 78 MMHG | BODY MASS INDEX: 19.83 KG/M2 | SYSTOLIC BLOOD PRESSURE: 116 MMHG

## 2017-10-19 PROCEDURE — 99205 OFFICE O/P NEW HI 60 MIN: CPT

## 2017-11-02 ENCOUNTER — APPOINTMENT (OUTPATIENT)
Dept: PULMONOLOGY | Facility: CLINIC | Age: 42
End: 2017-11-02
Payer: COMMERCIAL

## 2017-11-02 PROCEDURE — 94010 BREATHING CAPACITY TEST: CPT

## 2017-11-02 PROCEDURE — 94729 DIFFUSING CAPACITY: CPT

## 2017-11-02 PROCEDURE — 94726 PLETHYSMOGRAPHY LUNG VOLUMES: CPT

## 2017-11-15 ENCOUNTER — APPOINTMENT (OUTPATIENT)
Dept: RHEUMATOLOGY | Facility: CLINIC | Age: 42
End: 2017-11-15
Payer: COMMERCIAL

## 2017-11-15 VITALS
WEIGHT: 103 LBS | HEIGHT: 61 IN | DIASTOLIC BLOOD PRESSURE: 76 MMHG | OXYGEN SATURATION: 97 % | TEMPERATURE: 97.7 F | HEART RATE: 97 BPM | SYSTOLIC BLOOD PRESSURE: 115 MMHG | BODY MASS INDEX: 19.45 KG/M2

## 2017-11-15 PROCEDURE — 99215 OFFICE O/P EST HI 40 MIN: CPT

## 2017-11-17 ENCOUNTER — TRANSCRIPTION ENCOUNTER (OUTPATIENT)
Age: 42
End: 2017-11-17

## 2017-11-17 ENCOUNTER — RESULT REVIEW (OUTPATIENT)
Age: 42
End: 2017-11-17

## 2017-11-17 ENCOUNTER — APPOINTMENT (OUTPATIENT)
Dept: OBGYN | Facility: HOSPITAL | Age: 42
End: 2017-11-17
Payer: COMMERCIAL

## 2017-11-17 ENCOUNTER — MESSAGE (OUTPATIENT)
Age: 42
End: 2017-11-17

## 2017-11-17 ENCOUNTER — RX RENEWAL (OUTPATIENT)
Age: 42
End: 2017-11-17

## 2017-11-17 ENCOUNTER — OUTPATIENT (OUTPATIENT)
Dept: OUTPATIENT SERVICES | Facility: HOSPITAL | Age: 42
LOS: 1 days | End: 2017-11-17

## 2017-11-17 VITALS
HEIGHT: 61 IN | WEIGHT: 106 LBS | BODY MASS INDEX: 20.01 KG/M2 | SYSTOLIC BLOOD PRESSURE: 107 MMHG | HEART RATE: 94 BPM | DIASTOLIC BLOOD PRESSURE: 64 MMHG

## 2017-11-17 DIAGNOSIS — Z01.419 ENCOUNTER FOR GYNECOLOGICAL EXAMINATION (GENERAL) (ROUTINE) WITHOUT ABNORMAL FINDINGS: ICD-10-CM

## 2017-11-17 DIAGNOSIS — Z98.89 OTHER SPECIFIED POSTPROCEDURAL STATES: Chronic | ICD-10-CM

## 2017-11-17 LAB
ALBUMIN SERPL ELPH-MCNC: 4.1 G/DL
ALP BLD-CCNC: 75 U/L
ALT SERPL-CCNC: 14 U/L
ANION GAP SERPL CALC-SCNC: 14 MMOL/L
AST SERPL-CCNC: 31 U/L
BASOPHILS # BLD AUTO: 0.02 K/UL
BASOPHILS NFR BLD AUTO: 0.3 %
BILIRUB SERPL-MCNC: 0.4 MG/DL
BUN SERPL-MCNC: 14 MG/DL
CALCIUM SERPL-MCNC: 9.5 MG/DL
CHLORIDE SERPL-SCNC: 101 MMOL/L
CK SERPL-CCNC: 193 U/L
CO2 SERPL-SCNC: 24 MMOL/L
CREAT SERPL-MCNC: 0.55 MG/DL
EOSINOPHIL # BLD AUTO: 0.19 K/UL
EOSINOPHIL NFR BLD AUTO: 2.7
GLUCOSE SERPL-MCNC: 87 MG/DL
HCT VFR BLD CALC: 41.7 %
HGB BLD-MCNC: 13 G/DL
IMM GRANULOCYTES NFR BLD AUTO: 0.3 %
LYMPHOCYTES # BLD AUTO: 2.46 K/UL
LYMPHOCYTES NFR BLD AUTO: 34.7 %
MAN DIFF?: NORMAL
MCHC RBC-ENTMCNC: 26.3 PG
MCHC RBC-ENTMCNC: 31.2 GM/DL
MCV RBC AUTO: 84.4 FL
MONOCYTES # BLD AUTO: 0.8 K/UL
MONOCYTES NFR BLD AUTO: 11.3 %
NEUTROPHILS # BLD AUTO: 3.6 K/UL
NEUTROPHILS NFR BLD AUTO: 50.7 %
PLATELET # BLD AUTO: 189 K/UL
POTASSIUM SERPL-SCNC: 4.2 MMOL/L
PROT SERPL-MCNC: 7.4 G/DL
RBC # BLD: 4.94 M/UL
RBC # FLD: 14.5 %
SODIUM SERPL-SCNC: 139 MMOL/L
T4 FREE SERPL-MCNC: 1.5 NG/DL
TSH SERPL-ACNC: 0.23 UIU/ML
WBC # FLD AUTO: 7.09 K/UL

## 2017-11-17 PROCEDURE — 99213 OFFICE O/P EST LOW 20 MIN: CPT

## 2017-11-22 ENCOUNTER — APPOINTMENT (OUTPATIENT)
Dept: CARDIOLOGY | Facility: CLINIC | Age: 42
End: 2017-11-22
Payer: COMMERCIAL

## 2017-11-22 ENCOUNTER — NON-APPOINTMENT (OUTPATIENT)
Age: 42
End: 2017-11-22

## 2017-11-22 VITALS
BODY MASS INDEX: 20.39 KG/M2 | HEIGHT: 61 IN | SYSTOLIC BLOOD PRESSURE: 116 MMHG | HEART RATE: 107 BPM | TEMPERATURE: 98 F | DIASTOLIC BLOOD PRESSURE: 80 MMHG | WEIGHT: 108 LBS | OXYGEN SATURATION: 100 %

## 2017-11-22 PROCEDURE — 93000 ELECTROCARDIOGRAM COMPLETE: CPT

## 2017-11-22 PROCEDURE — 99215 OFFICE O/P EST HI 40 MIN: CPT

## 2017-12-08 ENCOUNTER — APPOINTMENT (OUTPATIENT)
Dept: CARDIOLOGY | Facility: CLINIC | Age: 42
End: 2017-12-08
Payer: COMMERCIAL

## 2017-12-08 PROCEDURE — 93306 TTE W/DOPPLER COMPLETE: CPT

## 2017-12-11 ENCOUNTER — RX RENEWAL (OUTPATIENT)
Age: 42
End: 2017-12-11

## 2017-12-13 ENCOUNTER — APPOINTMENT (OUTPATIENT)
Dept: NEUROLOGY | Facility: CLINIC | Age: 42
End: 2017-12-13
Payer: COMMERCIAL

## 2017-12-13 PROCEDURE — 95911 NRV CNDJ TEST 9-10 STUDIES: CPT

## 2017-12-13 PROCEDURE — 95886 MUSC TEST DONE W/N TEST COMP: CPT

## 2018-01-03 ENCOUNTER — LABORATORY RESULT (OUTPATIENT)
Age: 43
End: 2018-01-03

## 2018-01-03 LAB — TSH SERPL-MCNC: 2.71 UIU/ML — SIGNIFICANT CHANGE UP (ref 0.27–4.2)

## 2018-01-08 ENCOUNTER — APPOINTMENT (OUTPATIENT)
Dept: RHEUMATOLOGY | Facility: CLINIC | Age: 43
End: 2018-01-08
Payer: COMMERCIAL

## 2018-01-08 VITALS
OXYGEN SATURATION: 98 % | HEART RATE: 98 BPM | WEIGHT: 106 LBS | RESPIRATION RATE: 16 BRPM | DIASTOLIC BLOOD PRESSURE: 78 MMHG | BODY MASS INDEX: 20.01 KG/M2 | TEMPERATURE: 98 F | SYSTOLIC BLOOD PRESSURE: 110 MMHG | HEIGHT: 61 IN

## 2018-01-08 PROCEDURE — 99215 OFFICE O/P EST HI 40 MIN: CPT

## 2018-01-09 LAB
ALBUMIN SERPL ELPH-MCNC: 4.3 G/DL
ALP BLD-CCNC: 82 U/L
ALT SERPL-CCNC: 13 U/L
ANION GAP SERPL CALC-SCNC: 14 MMOL/L
AST SERPL-CCNC: 25 U/L
BASOPHILS # BLD AUTO: 0.03 K/UL
BASOPHILS NFR BLD AUTO: 0.3 %
BILIRUB SERPL-MCNC: 0.4 MG/DL
BUN SERPL-MCNC: 16 MG/DL
CALCIUM SERPL-MCNC: 9.7 MG/DL
CHLORIDE SERPL-SCNC: 101 MMOL/L
CK SERPL-CCNC: 228 U/L
CO2 SERPL-SCNC: 25 MMOL/L
CREAT SERPL-MCNC: 0.62 MG/DL
CRP SERPL-MCNC: <0.2 MG/DL
EOSINOPHIL # BLD AUTO: 0.21 K/UL
EOSINOPHIL NFR BLD AUTO: 2.3 %
ERYTHROCYTE [SEDIMENTATION RATE] IN BLOOD BY WESTERGREN METHOD: 16 MM/HR
GGT SERPL-CCNC: 10 U/L
GLUCOSE SERPL-MCNC: 83 MG/DL
HCT VFR BLD CALC: 42.5 %
HGB BLD-MCNC: 13.5 G/DL
IMM GRANULOCYTES NFR BLD AUTO: 0.2 %
LDH SERPL-CCNC: 230 U/L
LYMPHOCYTES # BLD AUTO: 2.99 K/UL
LYMPHOCYTES NFR BLD AUTO: 33.3 %
MAN DIFF?: NORMAL
MCHC RBC-ENTMCNC: 26 PG
MCHC RBC-ENTMCNC: 31.8 GM/DL
MCV RBC AUTO: 81.7 FL
MONOCYTES # BLD AUTO: 0.8 K/UL
MONOCYTES NFR BLD AUTO: 8.9 %
NEUTROPHILS # BLD AUTO: 4.94 K/UL
NEUTROPHILS NFR BLD AUTO: 55 %
PLATELET # BLD AUTO: 191 K/UL
POTASSIUM SERPL-SCNC: 4.3 MMOL/L
PROT SERPL-MCNC: 7.8 G/DL
RBC # BLD: 5.2 M/UL
RBC # FLD: 14.6 %
SODIUM SERPL-SCNC: 140 MMOL/L
WBC # FLD AUTO: 8.99 K/UL

## 2018-01-16 ENCOUNTER — APPOINTMENT (OUTPATIENT)
Dept: INTERNAL MEDICINE | Facility: HOSPITAL | Age: 43
End: 2018-01-16
Payer: COMMERCIAL

## 2018-01-16 ENCOUNTER — OUTPATIENT (OUTPATIENT)
Dept: OUTPATIENT SERVICES | Facility: HOSPITAL | Age: 43
LOS: 1 days | End: 2018-01-16

## 2018-01-16 VITALS — WEIGHT: 110 LBS | HEIGHT: 61 IN | BODY MASS INDEX: 20.77 KG/M2

## 2018-01-16 VITALS — TEMPERATURE: 98.4 F

## 2018-01-16 VITALS — HEART RATE: 96 BPM | SYSTOLIC BLOOD PRESSURE: 115 MMHG | DIASTOLIC BLOOD PRESSURE: 72 MMHG

## 2018-01-16 DIAGNOSIS — Z86.79 PERSONAL HISTORY OF OTHER DISEASES OF THE CIRCULATORY SYSTEM: ICD-10-CM

## 2018-01-16 DIAGNOSIS — Z98.89 OTHER SPECIFIED POSTPROCEDURAL STATES: Chronic | ICD-10-CM

## 2018-01-16 PROCEDURE — 99214 OFFICE O/P EST MOD 30 MIN: CPT | Mod: GC

## 2018-01-16 RX ORDER — CLOBETASOL PROPIONATE 0.5 MG/G
0.05 OINTMENT TOPICAL TWICE DAILY
Qty: 1 | Refills: 1 | Status: DISCONTINUED | COMMUNITY
Start: 2017-05-10 | End: 2018-01-16

## 2018-01-16 RX ORDER — LEVOTHYROXINE SODIUM 0.07 MG/1
75 TABLET ORAL DAILY
Qty: 30 | Refills: 1 | Status: DISCONTINUED | COMMUNITY
Start: 2017-03-02 | End: 2018-01-16

## 2018-01-19 DIAGNOSIS — Z86.79 PERSONAL HISTORY OF OTHER DISEASES OF THE CIRCULATORY SYSTEM: ICD-10-CM

## 2018-01-19 DIAGNOSIS — Z86.39 PERSONAL HISTORY OF OTHER ENDOCRINE, NUTRITIONAL AND METABOLIC DISEASE: ICD-10-CM

## 2018-01-25 ENCOUNTER — EMERGENCY (EMERGENCY)
Facility: HOSPITAL | Age: 43
LOS: 1 days | Discharge: HOME | End: 2018-01-25
Attending: EMERGENCY MEDICINE | Admitting: EMERGENCY MEDICINE
Payer: COMMERCIAL

## 2018-01-25 VITALS
HEART RATE: 104 BPM | RESPIRATION RATE: 18 BRPM | OXYGEN SATURATION: 100 % | DIASTOLIC BLOOD PRESSURE: 84 MMHG | SYSTOLIC BLOOD PRESSURE: 122 MMHG | WEIGHT: 108.91 LBS | HEIGHT: 61 IN | TEMPERATURE: 98 F

## 2018-01-25 VITALS — HEART RATE: 94 BPM

## 2018-01-25 DIAGNOSIS — Z98.89 OTHER SPECIFIED POSTPROCEDURAL STATES: Chronic | ICD-10-CM

## 2018-01-25 LAB
ALBUMIN SERPL ELPH-MCNC: 4.2 G/DL — SIGNIFICANT CHANGE UP (ref 3.3–5)
ALP SERPL-CCNC: 81 U/L — SIGNIFICANT CHANGE UP (ref 40–120)
ALT FLD-CCNC: 12 U/L — SIGNIFICANT CHANGE UP (ref 4–33)
AST SERPL-CCNC: 21 U/L — SIGNIFICANT CHANGE UP (ref 4–32)
BASE EXCESS BLDV CALC-SCNC: 0.8 MMOL/L — SIGNIFICANT CHANGE UP
BASOPHILS # BLD AUTO: 0.08 K/UL — SIGNIFICANT CHANGE UP (ref 0–0.2)
BASOPHILS NFR BLD AUTO: 0.6 % — SIGNIFICANT CHANGE UP (ref 0–2)
BILIRUB SERPL-MCNC: 0.3 MG/DL — SIGNIFICANT CHANGE UP (ref 0.2–1.2)
BLOOD GAS VENOUS - CREATININE: 0.38 MG/DL — LOW (ref 0.5–1.3)
BUN SERPL-MCNC: 11 MG/DL — SIGNIFICANT CHANGE UP (ref 7–23)
CALCIUM SERPL-MCNC: 8.9 MG/DL — SIGNIFICANT CHANGE UP (ref 8.4–10.5)
CHLORIDE BLDV-SCNC: 103 MMOL/L — SIGNIFICANT CHANGE UP (ref 96–108)
CHLORIDE SERPL-SCNC: 99 MMOL/L — SIGNIFICANT CHANGE UP (ref 98–107)
CO2 SERPL-SCNC: 26 MMOL/L — SIGNIFICANT CHANGE UP (ref 22–31)
CREAT SERPL-MCNC: 0.52 MG/DL — SIGNIFICANT CHANGE UP (ref 0.5–1.3)
EOSINOPHIL # BLD AUTO: 0.28 K/UL — SIGNIFICANT CHANGE UP (ref 0–0.5)
EOSINOPHIL NFR BLD AUTO: 2.1 % — SIGNIFICANT CHANGE UP (ref 0–6)
GAS PNL BLDV: 134 MMOL/L — LOW (ref 136–146)
GLUCOSE BLDV-MCNC: 68 — LOW (ref 70–99)
GLUCOSE SERPL-MCNC: 70 MG/DL — SIGNIFICANT CHANGE UP (ref 70–99)
HCO3 BLDV-SCNC: 23 MMOL/L — SIGNIFICANT CHANGE UP (ref 20–27)
HCT VFR BLD CALC: 43.5 % — SIGNIFICANT CHANGE UP (ref 34.5–45)
HCT VFR BLDV CALC: 43.7 % — SIGNIFICANT CHANGE UP (ref 34.5–45)
HGB BLD-MCNC: 13.5 G/DL — SIGNIFICANT CHANGE UP (ref 11.5–15.5)
HGB BLDV-MCNC: 14.2 G/DL — SIGNIFICANT CHANGE UP (ref 11.5–15.5)
IMM GRANULOCYTES # BLD AUTO: 0.1 # — SIGNIFICANT CHANGE UP
IMM GRANULOCYTES NFR BLD AUTO: 0.7 % — SIGNIFICANT CHANGE UP (ref 0–1.5)
LACTATE BLDV-MCNC: 1.5 MMOL/L — SIGNIFICANT CHANGE UP (ref 0.5–2)
LYMPHOCYTES # BLD AUTO: 26.7 % — SIGNIFICANT CHANGE UP (ref 13–44)
LYMPHOCYTES # BLD AUTO: 3.57 K/UL — HIGH (ref 1–3.3)
MCHC RBC-ENTMCNC: 25.1 PG — LOW (ref 27–34)
MCHC RBC-ENTMCNC: 31 % — LOW (ref 32–36)
MCV RBC AUTO: 80.9 FL — SIGNIFICANT CHANGE UP (ref 80–100)
MONOCYTES # BLD AUTO: 1.41 K/UL — HIGH (ref 0–0.9)
MONOCYTES NFR BLD AUTO: 10.5 % — SIGNIFICANT CHANGE UP (ref 2–14)
NEUTROPHILS # BLD AUTO: 7.95 K/UL — HIGH (ref 1.8–7.4)
NEUTROPHILS NFR BLD AUTO: 59.4 % — SIGNIFICANT CHANGE UP (ref 43–77)
NRBC # FLD: 0 — SIGNIFICANT CHANGE UP
PCO2 BLDV: 52 MMHG — HIGH (ref 41–51)
PH BLDV: 7.33 PH — SIGNIFICANT CHANGE UP (ref 7.32–7.43)
PLATELET # BLD AUTO: 262 K/UL — SIGNIFICANT CHANGE UP (ref 150–400)
PMV BLD: 11.1 FL — SIGNIFICANT CHANGE UP (ref 7–13)
PO2 BLDV: 30 MMHG — LOW (ref 35–40)
POTASSIUM BLDV-SCNC: 3.9 MMOL/L — SIGNIFICANT CHANGE UP (ref 3.4–4.5)
POTASSIUM SERPL-MCNC: 4.1 MMOL/L — SIGNIFICANT CHANGE UP (ref 3.5–5.3)
POTASSIUM SERPL-SCNC: 4.1 MMOL/L — SIGNIFICANT CHANGE UP (ref 3.5–5.3)
PROT SERPL-MCNC: 7.5 G/DL — SIGNIFICANT CHANGE UP (ref 6–8.3)
RBC # BLD: 5.38 M/UL — HIGH (ref 3.8–5.2)
RBC # FLD: 13.5 % — SIGNIFICANT CHANGE UP (ref 10.3–14.5)
SAO2 % BLDV: 55.1 % — LOW (ref 60–85)
SODIUM SERPL-SCNC: 137 MMOL/L — SIGNIFICANT CHANGE UP (ref 135–145)
WBC # BLD: 13.39 K/UL — HIGH (ref 3.8–10.5)
WBC # FLD AUTO: 13.39 K/UL — HIGH (ref 3.8–10.5)

## 2018-01-25 PROCEDURE — 71046 X-RAY EXAM CHEST 2 VIEWS: CPT | Mod: 26

## 2018-01-25 PROCEDURE — 99284 EMERGENCY DEPT VISIT MOD MDM: CPT

## 2018-01-25 RX ORDER — SODIUM CHLORIDE 9 MG/ML
1000 INJECTION INTRAMUSCULAR; INTRAVENOUS; SUBCUTANEOUS ONCE
Qty: 0 | Refills: 0 | Status: COMPLETED | OUTPATIENT
Start: 2018-01-25 | End: 2018-01-25

## 2018-01-25 RX ORDER — ALBUTEROL 90 UG/1
2.5 AEROSOL, METERED ORAL ONCE
Qty: 0 | Refills: 0 | Status: COMPLETED | OUTPATIENT
Start: 2018-01-25 | End: 2018-01-25

## 2018-01-25 RX ORDER — ALBUTEROL 90 UG/1
2 AEROSOL, METERED ORAL
Qty: 1 | Refills: 0 | OUTPATIENT
Start: 2018-01-25 | End: 2018-02-23

## 2018-01-25 RX ADMIN — ALBUTEROL 2.5 MILLIGRAM(S): 90 AEROSOL, METERED ORAL at 15:52

## 2018-01-25 RX ADMIN — SODIUM CHLORIDE 1000 MILLILITER(S): 9 INJECTION INTRAMUSCULAR; INTRAVENOUS; SUBCUTANEOUS at 15:52

## 2018-01-25 NOTE — ED PROVIDER NOTE - MEDICAL DECISION MAKING DETAILS
uri/bronchitis, pt very well appearing, afebrile, lungs CTA b/l , no W/R/R, s1 s2 appreciated, abd osft, nt/nd, afberile; labs, CXR, albuterol, will reasses.

## 2018-01-25 NOTE — ED ADULT NURSE NOTE - OBJECTIVE STATEMENT
Patient presented to ED with c/o cough and chest discomfort x 2 weeks with worsening of symptoms.  Blood work drawn and sent to lab. Patient evaluated by provider, IVF and neb ordered and administered.  Report endorsed to RW for f/u care and treatment.  Collin PITTS

## 2018-01-25 NOTE — ED PROVIDER NOTE - CARE PLAN
Principal Discharge DX:	Upper respiratory symptom  Assessment and plan of treatment:	PLEASE MAKE SURE THAT YOU DRINK PLENTY OF FLUIDS, CONTINUE TAKING YOUR DAILY MEDICATIONS AS PRESCRIBED, YOU JENAE ALSO USE THE PUMP AS NEEDED FOR THE COUGH. FOLLOW UP WITH YOUR DOCTOR WITHIN NEXT 48HRS.L RETURN TO ER FOR CHEST PAIN, SHORTNESS OF BREATH, COUGHING UP BLOOD OR ANY COMPLAINTS.

## 2018-01-25 NOTE — ED PROVIDER NOTE - PLAN OF CARE
PLEASE MAKE SURE THAT YOU DRINK PLENTY OF FLUIDS, CONTINUE TAKING YOUR DAILY MEDICATIONS AS PRESCRIBED, YOU JENAE ALSO USE THE PUMP AS NEEDED FOR THE COUGH. FOLLOW UP WITH YOUR DOCTOR WITHIN NEXT 48HRS.L RETURN TO ER FOR CHEST PAIN, SHORTNESS OF BREATH, COUGHING UP BLOOD OR ANY COMPLAINTS.

## 2018-01-25 NOTE — ED PROVIDER NOTE - OBJECTIVE STATEMENT
Pt is 43 y/o female with Pmhx of scleroderma and RA (on cellcept and Plaquenil) here for eval of non-productive cough and pleuritic cp x 2 wks. Pt states that she initially experienced subjective fever and nasal congestion as well as sore throat with the onset of sx, all of which resolved except for cough. Cough is worse at night, not associated with sob, pt denies hemoptysis, night sweats, weight loss, denies HA, denies recent travel/prolonged immobilization, personal/family hx of coagulopathies, non smoker, not on exogenous estrogen, pt denies abd pain, n/v/d. reports multiple sick contacts at home,. LMP - 2 wks ago, pt received flu shot last sept. rheum - Dr Salas. denies hx of reactive airway.

## 2018-01-25 NOTE — ED ADULT TRIAGE NOTE - CHIEF COMPLAINT QUOTE
pt c/o subjective fevers x 2 days with body aches, non-productive cough and intermittent vomiting. denies any additional symptoms. PMHX: scleroderma

## 2018-01-25 NOTE — ED PROVIDER NOTE - ATTENDING CONTRIBUTION TO CARE
AJM: Pt seen with PA and agree with above note. Pt is 43 y/o female with Pmhx of scleroderma and RA (on cellcept and Plaquenil) here for eval of non-productive cough and pleuritic cp x 2 wks. + subjective fevers and chills. No rashes, travel, neck stiffness, AMS. Well appearing on exam with normal resp exam. Likely viral resp illness but given immunocompromise from medications will send labs, cxr, ivf. if bacterial source is suggested will add abx, otherwise likely dc home with supportive care.

## 2018-01-31 ENCOUNTER — OTHER (OUTPATIENT)
Age: 43
End: 2018-01-31

## 2018-01-31 ENCOUNTER — MEDICATION RENEWAL (OUTPATIENT)
Age: 43
End: 2018-01-31

## 2018-02-12 ENCOUNTER — APPOINTMENT (OUTPATIENT)
Dept: RHEUMATOLOGY | Facility: CLINIC | Age: 43
End: 2018-02-12
Payer: COMMERCIAL

## 2018-02-12 VITALS
DIASTOLIC BLOOD PRESSURE: 69 MMHG | OXYGEN SATURATION: 98 % | HEART RATE: 93 BPM | HEIGHT: 61 IN | BODY MASS INDEX: 20.58 KG/M2 | TEMPERATURE: 97.9 F | WEIGHT: 109 LBS | SYSTOLIC BLOOD PRESSURE: 100 MMHG

## 2018-02-12 PROCEDURE — 99215 OFFICE O/P EST HI 40 MIN: CPT

## 2018-02-13 LAB
ALBUMIN SERPL ELPH-MCNC: 4.4 G/DL
ALP BLD-CCNC: 90 U/L
ALT SERPL-CCNC: 13 U/L
ANION GAP SERPL CALC-SCNC: 12 MMOL/L
AST SERPL-CCNC: 21 U/L
BASOPHILS # BLD AUTO: 0.03 K/UL
BASOPHILS NFR BLD AUTO: 0.3 %
BILIRUB SERPL-MCNC: 0.4 MG/DL
BUN SERPL-MCNC: 10 MG/DL
CALCIUM SERPL-MCNC: 9.3 MG/DL
CHLORIDE SERPL-SCNC: 102 MMOL/L
CK SERPL-CCNC: 198 U/L
CO2 SERPL-SCNC: 27 MMOL/L
CREAT SERPL-MCNC: 0.45 MG/DL
CRP SERPL-MCNC: <0.2 MG/DL
EOSINOPHIL # BLD AUTO: 0.25 K/UL
EOSINOPHIL NFR BLD AUTO: 2.9 %
ERYTHROCYTE [SEDIMENTATION RATE] IN BLOOD BY WESTERGREN METHOD: 20 MM/HR
GGT SERPL-CCNC: 13 U/L
GLUCOSE SERPL-MCNC: 101 MG/DL
HCT VFR BLD CALC: 40.7 %
HGB BLD-MCNC: 12.9 G/DL
IMM GRANULOCYTES NFR BLD AUTO: 0.1 %
LDH SERPL-CCNC: 198 U/L
LYMPHOCYTES # BLD AUTO: 2.83 K/UL
LYMPHOCYTES NFR BLD AUTO: 32.9 %
MAN DIFF?: NORMAL
MCHC RBC-ENTMCNC: 26.1 PG
MCHC RBC-ENTMCNC: 31.7 GM/DL
MCV RBC AUTO: 82.2 FL
MONOCYTES # BLD AUTO: 0.89 K/UL
MONOCYTES NFR BLD AUTO: 10.3 %
MYOGLOBIN SERPL-MCNC: 63 NG/ML
NEUTROPHILS # BLD AUTO: 4.6 K/UL
NEUTROPHILS NFR BLD AUTO: 53.5 %
PLATELET # BLD AUTO: 208 K/UL
POTASSIUM SERPL-SCNC: 4.2 MMOL/L
PROT SERPL-MCNC: 7.4 G/DL
RBC # BLD: 4.95 M/UL
RBC # FLD: 14.7 %
SODIUM SERPL-SCNC: 141 MMOL/L
WBC # FLD AUTO: 8.61 K/UL

## 2018-02-15 ENCOUNTER — FORM ENCOUNTER (OUTPATIENT)
Age: 43
End: 2018-02-15

## 2018-02-16 ENCOUNTER — APPOINTMENT (OUTPATIENT)
Dept: MRI IMAGING | Facility: CLINIC | Age: 43
End: 2018-02-16
Payer: COMMERCIAL

## 2018-02-16 ENCOUNTER — OUTPATIENT (OUTPATIENT)
Dept: OUTPATIENT SERVICES | Facility: HOSPITAL | Age: 43
LOS: 1 days | End: 2018-02-16
Payer: COMMERCIAL

## 2018-02-16 DIAGNOSIS — Z98.89 OTHER SPECIFIED POSTPROCEDURAL STATES: Chronic | ICD-10-CM

## 2018-02-16 DIAGNOSIS — M54.2 CERVICALGIA: ICD-10-CM

## 2018-02-16 DIAGNOSIS — Z00.8 ENCOUNTER FOR OTHER GENERAL EXAMINATION: ICD-10-CM

## 2018-02-16 PROCEDURE — 72141 MRI NECK SPINE W/O DYE: CPT | Mod: 26

## 2018-02-16 PROCEDURE — 72141 MRI NECK SPINE W/O DYE: CPT

## 2018-02-20 ENCOUNTER — RX RENEWAL (OUTPATIENT)
Age: 43
End: 2018-02-20

## 2018-02-21 ENCOUNTER — APPOINTMENT (OUTPATIENT)
Dept: RHEUMATOLOGY | Facility: CLINIC | Age: 43
End: 2018-02-21
Payer: COMMERCIAL

## 2018-02-21 VITALS
BODY MASS INDEX: 20.39 KG/M2 | DIASTOLIC BLOOD PRESSURE: 70 MMHG | OXYGEN SATURATION: 98 % | TEMPERATURE: 97.8 F | HEIGHT: 61 IN | SYSTOLIC BLOOD PRESSURE: 106 MMHG | RESPIRATION RATE: 16 BRPM | HEART RATE: 90 BPM | WEIGHT: 108 LBS

## 2018-02-21 PROCEDURE — 99214 OFFICE O/P EST MOD 30 MIN: CPT

## 2018-02-21 PROCEDURE — 99215 OFFICE O/P EST HI 40 MIN: CPT

## 2018-02-27 ENCOUNTER — APPOINTMENT (OUTPATIENT)
Dept: PULMONOLOGY | Facility: CLINIC | Age: 43
End: 2018-02-27
Payer: COMMERCIAL

## 2018-02-27 VITALS
SYSTOLIC BLOOD PRESSURE: 110 MMHG | DIASTOLIC BLOOD PRESSURE: 80 MMHG | BODY MASS INDEX: 22.18 KG/M2 | HEART RATE: 102 BPM | OXYGEN SATURATION: 97 % | HEIGHT: 59 IN | TEMPERATURE: 97 F | RESPIRATION RATE: 19 BRPM | WEIGHT: 110 LBS

## 2018-02-27 DIAGNOSIS — R76.11 NONSPECIFIC REACTION TO TUBERCULIN SKIN TEST W/OUT ACTIVE TUBERCULOSIS: ICD-10-CM

## 2018-02-27 PROCEDURE — 99214 OFFICE O/P EST MOD 30 MIN: CPT | Mod: 25

## 2018-02-27 PROCEDURE — ZZZZZ: CPT

## 2018-02-27 PROCEDURE — 94729 DIFFUSING CAPACITY: CPT

## 2018-02-27 PROCEDURE — 94010 BREATHING CAPACITY TEST: CPT

## 2018-02-27 PROCEDURE — 94726 PLETHYSMOGRAPHY LUNG VOLUMES: CPT

## 2018-02-28 ENCOUNTER — APPOINTMENT (OUTPATIENT)
Dept: MRI IMAGING | Facility: CLINIC | Age: 43
End: 2018-02-28

## 2018-03-08 ENCOUNTER — RX RENEWAL (OUTPATIENT)
Age: 43
End: 2018-03-08

## 2018-03-14 ENCOUNTER — APPOINTMENT (OUTPATIENT)
Dept: RHEUMATOLOGY | Facility: CLINIC | Age: 43
End: 2018-03-14
Payer: COMMERCIAL

## 2018-03-14 VITALS
HEART RATE: 99 BPM | OXYGEN SATURATION: 99 % | TEMPERATURE: 98.2 F | DIASTOLIC BLOOD PRESSURE: 73 MMHG | HEIGHT: 59 IN | WEIGHT: 110 LBS | SYSTOLIC BLOOD PRESSURE: 108 MMHG | BODY MASS INDEX: 22.18 KG/M2

## 2018-03-14 PROCEDURE — 99215 OFFICE O/P EST HI 40 MIN: CPT

## 2018-03-14 RX ORDER — GABAPENTIN 100 MG/1
100 CAPSULE ORAL
Qty: 90 | Refills: 1 | Status: DISCONTINUED | COMMUNITY
Start: 2018-01-08 | End: 2018-03-14

## 2018-03-15 LAB
ALBUMIN SERPL ELPH-MCNC: 4.1 G/DL
ALP BLD-CCNC: 90 U/L
ALT SERPL-CCNC: 20 U/L
ANION GAP SERPL CALC-SCNC: 13 MMOL/L
AST SERPL-CCNC: 25 U/L
BASOPHILS # BLD AUTO: 0.03 K/UL
BASOPHILS NFR BLD AUTO: 0.4 %
BILIRUB SERPL-MCNC: 0.3 MG/DL
BUN SERPL-MCNC: 14 MG/DL
CALCIUM SERPL-MCNC: 9.2 MG/DL
CHLORIDE SERPL-SCNC: 101 MMOL/L
CK SERPL-CCNC: 205 U/L
CO2 SERPL-SCNC: 27 MMOL/L
CREAT SERPL-MCNC: 0.49 MG/DL
EOSINOPHIL # BLD AUTO: 0.26 K/UL
EOSINOPHIL NFR BLD AUTO: 3.1 %
GLUCOSE SERPL-MCNC: 100 MG/DL
HCT VFR BLD CALC: 41.1 %
HGB BLD-MCNC: 12.8 G/DL
IMM GRANULOCYTES NFR BLD AUTO: 0.4 %
LDH SERPL-CCNC: 206 U/L
LYMPHOCYTES # BLD AUTO: 2.62 K/UL
LYMPHOCYTES NFR BLD AUTO: 31.2 %
MAN DIFF?: NORMAL
MCHC RBC-ENTMCNC: 26.2 PG
MCHC RBC-ENTMCNC: 31.1 GM/DL
MCV RBC AUTO: 84.2 FL
MONOCYTES # BLD AUTO: 0.89 K/UL
MONOCYTES NFR BLD AUTO: 10.6 %
MYOGLOBIN SERPL-MCNC: 72 NG/ML
NEUTROPHILS # BLD AUTO: 4.57 K/UL
NEUTROPHILS NFR BLD AUTO: 54.3 %
PLATELET # BLD AUTO: 197 K/UL
POTASSIUM SERPL-SCNC: 4.1 MMOL/L
PROT SERPL-MCNC: 7.3 G/DL
RBC # BLD: 4.88 M/UL
RBC # FLD: 14.5 %
SODIUM SERPL-SCNC: 141 MMOL/L
WBC # FLD AUTO: 8.4 K/UL

## 2018-03-18 ENCOUNTER — FORM ENCOUNTER (OUTPATIENT)
Age: 43
End: 2018-03-18

## 2018-03-19 ENCOUNTER — APPOINTMENT (OUTPATIENT)
Dept: MRI IMAGING | Facility: CLINIC | Age: 43
End: 2018-03-19
Payer: COMMERCIAL

## 2018-03-19 ENCOUNTER — OUTPATIENT (OUTPATIENT)
Dept: OUTPATIENT SERVICES | Facility: HOSPITAL | Age: 43
LOS: 1 days | End: 2018-03-19
Payer: COMMERCIAL

## 2018-03-19 ENCOUNTER — MESSAGE (OUTPATIENT)
Age: 43
End: 2018-03-19

## 2018-03-19 DIAGNOSIS — Z00.8 ENCOUNTER FOR OTHER GENERAL EXAMINATION: ICD-10-CM

## 2018-03-19 DIAGNOSIS — Z98.89 OTHER SPECIFIED POSTPROCEDURAL STATES: Chronic | ICD-10-CM

## 2018-03-19 DIAGNOSIS — M60.9 MYOSITIS, UNSPECIFIED: ICD-10-CM

## 2018-03-19 PROCEDURE — 73218 MRI UPPER EXTREMITY W/O DYE: CPT | Mod: 26,RT

## 2018-03-19 PROCEDURE — 73218 MRI UPPER EXTREMITY W/O DYE: CPT

## 2018-03-28 ENCOUNTER — APPOINTMENT (OUTPATIENT)
Dept: MRI IMAGING | Facility: CLINIC | Age: 43
End: 2018-03-28

## 2018-04-04 ENCOUNTER — RX RENEWAL (OUTPATIENT)
Age: 43
End: 2018-04-04

## 2018-04-18 ENCOUNTER — RX RENEWAL (OUTPATIENT)
Age: 43
End: 2018-04-18

## 2018-04-23 ENCOUNTER — RX RENEWAL (OUTPATIENT)
Age: 43
End: 2018-04-23

## 2018-05-13 ENCOUNTER — FORM ENCOUNTER (OUTPATIENT)
Age: 43
End: 2018-05-13

## 2018-05-14 ENCOUNTER — APPOINTMENT (OUTPATIENT)
Dept: RHEUMATOLOGY | Facility: CLINIC | Age: 43
End: 2018-05-14
Payer: COMMERCIAL

## 2018-05-14 ENCOUNTER — APPOINTMENT (OUTPATIENT)
Age: 43
End: 2018-05-14
Payer: COMMERCIAL

## 2018-05-14 VITALS
DIASTOLIC BLOOD PRESSURE: 72 MMHG | BODY MASS INDEX: 22.78 KG/M2 | RESPIRATION RATE: 18 BRPM | WEIGHT: 113 LBS | TEMPERATURE: 98 F | HEIGHT: 59 IN | SYSTOLIC BLOOD PRESSURE: 110 MMHG | HEART RATE: 99 BPM | OXYGEN SATURATION: 98 %

## 2018-05-14 PROCEDURE — 90750 HZV VACC RECOMBINANT IM: CPT

## 2018-05-14 PROCEDURE — 99214 OFFICE O/P EST MOD 30 MIN: CPT | Mod: 25

## 2018-05-14 PROCEDURE — 90471 IMMUNIZATION ADMIN: CPT

## 2018-05-14 PROCEDURE — 73562 X-RAY EXAM OF KNEE 3: CPT | Mod: LT

## 2018-05-15 ENCOUNTER — TRANSCRIPTION ENCOUNTER (OUTPATIENT)
Age: 43
End: 2018-05-15

## 2018-05-15 LAB
ALBUMIN SERPL ELPH-MCNC: 4.2 G/DL
ALP BLD-CCNC: 75 U/L
ALT SERPL-CCNC: 15 U/L
ANION GAP SERPL CALC-SCNC: 10 MMOL/L
AST SERPL-CCNC: 20 U/L
BASOPHILS # BLD AUTO: 0.02 K/UL
BASOPHILS NFR BLD AUTO: 0.2 %
BILIRUB SERPL-MCNC: 0.3 MG/DL
BUN SERPL-MCNC: 11 MG/DL
CALCIUM SERPL-MCNC: 9.3 MG/DL
CHLORIDE SERPL-SCNC: 103 MMOL/L
CK SERPL-CCNC: 210 U/L
CO2 SERPL-SCNC: 26 MMOL/L
CREAT SERPL-MCNC: 0.43 MG/DL
CRP SERPL-MCNC: <0.2 MG/DL
EOSINOPHIL # BLD AUTO: 0.17 K/UL
EOSINOPHIL NFR BLD AUTO: 2 %
GLUCOSE SERPL-MCNC: 94 MG/DL
HCT VFR BLD CALC: 40.8 %
HGB BLD-MCNC: 12.4 G/DL
IMM GRANULOCYTES NFR BLD AUTO: 0.2 %
LYMPHOCYTES # BLD AUTO: 3.08 K/UL
LYMPHOCYTES NFR BLD AUTO: 36.4 %
MAN DIFF?: NORMAL
MCHC RBC-ENTMCNC: 25.2 PG
MCHC RBC-ENTMCNC: 30.4 GM/DL
MCV RBC AUTO: 82.8 FL
MONOCYTES # BLD AUTO: 0.69 K/UL
MONOCYTES NFR BLD AUTO: 8.2 %
MYOGLOBIN SERPL-MCNC: 94 NG/ML
NEUTROPHILS # BLD AUTO: 4.47 K/UL
NEUTROPHILS NFR BLD AUTO: 53 %
PLATELET # BLD AUTO: 182 K/UL
POTASSIUM SERPL-SCNC: 4.3 MMOL/L
PROT SERPL-MCNC: 7.4 G/DL
RBC # BLD: 4.93 M/UL
RBC # FLD: 14.9 %
SODIUM SERPL-SCNC: 139 MMOL/L
WBC # FLD AUTO: 8.45 K/UL

## 2018-06-17 ENCOUNTER — RX RENEWAL (OUTPATIENT)
Age: 43
End: 2018-06-17

## 2018-06-26 ENCOUNTER — APPOINTMENT (OUTPATIENT)
Dept: RHEUMATOLOGY | Facility: CLINIC | Age: 43
End: 2018-06-26
Payer: COMMERCIAL

## 2018-06-26 VITALS
OXYGEN SATURATION: 99 % | DIASTOLIC BLOOD PRESSURE: 85 MMHG | SYSTOLIC BLOOD PRESSURE: 124 MMHG | HEIGHT: 59 IN | TEMPERATURE: 98.7 F | BODY MASS INDEX: 22.58 KG/M2 | WEIGHT: 112 LBS | HEART RATE: 87 BPM

## 2018-06-26 PROCEDURE — 99215 OFFICE O/P EST HI 40 MIN: CPT

## 2018-06-27 LAB
25(OH)D3 SERPL-MCNC: 25.2 NG/ML
ALBUMIN SERPL ELPH-MCNC: 4.4 G/DL
ALP BLD-CCNC: 85 U/L
ALT SERPL-CCNC: 15 U/L
ANION GAP SERPL CALC-SCNC: 15 MMOL/L
APPEARANCE: CLEAR
AST SERPL-CCNC: 13 U/L
BACTERIA: ABNORMAL
BASOPHILS # BLD AUTO: 0.04 K/UL
BASOPHILS NFR BLD AUTO: 0.4 %
BILIRUB SERPL-MCNC: 0.5 MG/DL
BILIRUBIN URINE: NEGATIVE
BLOOD URINE: ABNORMAL
BUN SERPL-MCNC: 10 MG/DL
C3 SERPL-MCNC: 122 MG/DL
C4 SERPL-MCNC: 31 MG/DL
CALCIUM SERPL-MCNC: 9.5 MG/DL
CHLORIDE SERPL-SCNC: 98 MMOL/L
CK SERPL-CCNC: 78 U/L
CO2 SERPL-SCNC: 27 MMOL/L
COLOR: YELLOW
CREAT SERPL-MCNC: 0.59 MG/DL
CREAT SPEC-SCNC: 91 MG/DL
CREAT/PROT UR: 0.1 RATIO
DSDNA AB SER-ACNC: <12 IU/ML
EOSINOPHIL # BLD AUTO: 0.18 K/UL
EOSINOPHIL NFR BLD AUTO: 1.6 %
FERRITIN SERPL-MCNC: 25 NG/ML
GLUCOSE QUALITATIVE U: NEGATIVE MG/DL
GLUCOSE SERPL-MCNC: 67 MG/DL
HCT VFR BLD CALC: 43.9 %
HGB BLD-MCNC: 13.7 G/DL
HYALINE CASTS: 4 /LPF
IMM GRANULOCYTES NFR BLD AUTO: 0.5 %
KETONES URINE: NEGATIVE
LEUKOCYTE ESTERASE URINE: ABNORMAL
LYMPHOCYTES # BLD AUTO: 4.7 K/UL
LYMPHOCYTES NFR BLD AUTO: 42 %
MAN DIFF?: NORMAL
MCHC RBC-ENTMCNC: 25.6 PG
MCHC RBC-ENTMCNC: 31.2 GM/DL
MCV RBC AUTO: 82.1 FL
MICROSCOPIC-UA: NORMAL
MONOCYTES # BLD AUTO: 0.92 K/UL
MONOCYTES NFR BLD AUTO: 8.2 %
MYOGLOBIN SERPL-MCNC: 57 NG/ML
NEUTROPHILS # BLD AUTO: 5.28 K/UL
NEUTROPHILS NFR BLD AUTO: 47.3 %
NITRITE URINE: NEGATIVE
PH URINE: 5.5
PLATELET # BLD AUTO: 225 K/UL
POTASSIUM SERPL-SCNC: 3.9 MMOL/L
PROT SERPL-MCNC: 7.2 G/DL
PROT UR-MCNC: 10 MG/DL
PROTEIN URINE: NEGATIVE MG/DL
RBC # BLD: 5.35 M/UL
RBC # FLD: 15 %
RED BLOOD CELLS URINE: 6 /HPF
SODIUM SERPL-SCNC: 140 MMOL/L
SPECIFIC GRAVITY URINE: 1.02
SQUAMOUS EPITHELIAL CELLS: 8 /HPF
UROBILINOGEN URINE: NEGATIVE MG/DL
WBC # FLD AUTO: 11.18 K/UL
WHITE BLOOD CELLS URINE: 10 /HPF

## 2018-07-03 ENCOUNTER — OUTPATIENT (OUTPATIENT)
Dept: OUTPATIENT SERVICES | Facility: HOSPITAL | Age: 43
LOS: 1 days | End: 2018-07-03
Payer: COMMERCIAL

## 2018-07-03 DIAGNOSIS — R06.02 SHORTNESS OF BREATH: ICD-10-CM

## 2018-07-03 DIAGNOSIS — Z98.89 OTHER SPECIFIED POSTPROCEDURAL STATES: Chronic | ICD-10-CM

## 2018-07-03 PROCEDURE — 93306 TTE W/DOPPLER COMPLETE: CPT

## 2018-07-03 PROCEDURE — 93306 TTE W/DOPPLER COMPLETE: CPT | Mod: 26

## 2018-07-06 ENCOUNTER — RX RENEWAL (OUTPATIENT)
Age: 43
End: 2018-07-06

## 2018-07-11 ENCOUNTER — APPOINTMENT (OUTPATIENT)
Dept: INTERNAL MEDICINE | Facility: HOSPITAL | Age: 43
End: 2018-07-11
Payer: COMMERCIAL

## 2018-07-11 ENCOUNTER — RECORD ABSTRACTING (OUTPATIENT)
Age: 43
End: 2018-07-11

## 2018-07-11 ENCOUNTER — OUTPATIENT (OUTPATIENT)
Dept: OUTPATIENT SERVICES | Facility: HOSPITAL | Age: 43
LOS: 1 days | End: 2018-07-11

## 2018-07-11 VITALS — DIASTOLIC BLOOD PRESSURE: 68 MMHG | SYSTOLIC BLOOD PRESSURE: 96 MMHG

## 2018-07-11 VITALS
HEIGHT: 60 IN | DIASTOLIC BLOOD PRESSURE: 90 MMHG | WEIGHT: 114 LBS | BODY MASS INDEX: 22.38 KG/M2 | HEART RATE: 98 BPM | SYSTOLIC BLOOD PRESSURE: 124 MMHG

## 2018-07-11 DIAGNOSIS — Z98.89 OTHER SPECIFIED POSTPROCEDURAL STATES: Chronic | ICD-10-CM

## 2018-07-11 DIAGNOSIS — Z87.898 PERSONAL HISTORY OF OTHER SPECIFIED CONDITIONS: ICD-10-CM

## 2018-07-11 LAB — CYTOLOGY CVX/VAG DOC THIN PREP: NORMAL

## 2018-07-11 PROCEDURE — 99396 PREV VISIT EST AGE 40-64: CPT

## 2018-07-11 NOTE — HEALTH RISK ASSESSMENT
[FreeTextEntry1] : breast cyst [] : No [de-identified] : Rheumatology and pulmonary [With Family] : lives with family [] :  [# Of Children ___] : has [unfilled] children [Reports changes in vision] : Reports no changes in vision [Reports changes in dental health] : Reports no changes in dental health

## 2018-07-11 NOTE — REVIEW OF SYSTEMS
[Chest Pain] : no chest pain [Palpitations] : no palpitations [Lower Ext Edema] : no lower extremity edema [Shortness Of Breath] : shortness of breath [Cough] : no cough [Dyspnea on Exertion] : dyspnea on exertion [Constipation] : no constipation [Diarrhea] : diarrhea [Vomiting] : no vomiting [Melena] : no melena [Itching] : Itching [Easy Bruising] : easy bruising [Negative] : Neurological [FreeTextEntry6] : see HPI [FreeTextEntry7] : gets full early when eating meals.  Feels her abdomen is bloated. Has frequent bowel movements during day that she attributes to her medications. [FreeTextEntry9] : Joint pains are improved for the most part on the prednisone [de-identified] : Itching of skin sometimes with a rash, sometimes not since on prednisone.

## 2018-07-11 NOTE — HISTORY OF PRESENT ILLNESS
[FreeTextEntry1] : saw cysts in breasts in recent imaging and wants follow up [de-identified] : 42 year old female with Mixed connective tissue disease, scleroderma and interstitial lung disease presents for follow up routine care and with concerns of breast cysts seen in recent imaging.  Patient is for the most part doing well with the exception of some symptoms she has been having since starting prednisone by Rheumatology. Sometimes short of breath intermittently when walking, climbing up stairs, since starting prednisone.  Patient states that they lowered her prednisone to improve some of symptoms of headaches, pain has improved however her skin is itchy, sometimes rash.  Also seeing bruises.  Seeing Rheumatology on the 25th of this month.  She has been referred for repeat pulmonary function tests and to see the pulmonologist as well in regards to her shortness of breath on exertion.\par \par Medication list reviewed with patient: see list below, however patient states that she never started the inhaler on her medication list.

## 2018-07-11 NOTE — ASSESSMENT
[FreeTextEntry1] : Assessment as indicated above in impressions with plans through orders below. Comprehensive visit performed today, reviewed need for second dose of shingrix with patient, she will obtain from Rheumatology. Up to date with cervical cancer screening, breast cancer screening discussed above as patient will need diagnostic imaging for breast complaints.

## 2018-07-11 NOTE — PHYSICAL EXAM
[Normal Appearance] : normal in appearance [No Nipple Discharge] : no nipple discharge [No Axillary Lymphadenopathy] : no axillary lymphadenopathy [No Acute Distress] : no acute distress [Well Developed] : well developed [Well-Appearing] : well-appearing [Normal Outer Ear/Nose] : the outer ears and nose were normal in appearance [Normal TMs] : both tympanic membranes were normal [No Lymphadenopathy] : no lymphadenopathy [Thyroid Normal, No Nodules] : the thyroid was normal and there were no nodules present [No Respiratory Distress] : no respiratory distress  [Clear to Auscultation] : lungs were clear to auscultation bilaterally [Normal Rate] : normal rate  [Regular Rhythm] : with a regular rhythm [Normal S1, S2] : normal S1 and S2 [No Abdominal Bruit] : a ~M bruit was not heard ~T in the abdomen [No Edema] : there was no peripheral edema [No Palpable Aorta] : no palpable aorta [Soft] : abdomen soft [Non Tender] : non-tender [Non-distended] : non-distended [No Masses] : no abdominal mass palpated [No HSM] : no HSM [Normal Bowel Sounds] : normal bowel sounds [Normal Axillary Nodes] : no axillary lymphadenopathy [Normal Affect] : the affect was normal [Alert and Oriented x3] : oriented to person, place, and time [Normal Mood] : the mood was normal

## 2018-07-12 DIAGNOSIS — R92.8 OTHER ABNORMAL AND INCONCLUSIVE FINDINGS ON DIAGNOSTIC IMAGING OF BREAST: ICD-10-CM

## 2018-07-12 DIAGNOSIS — Z79.899 OTHER LONG TERM (CURRENT) DRUG THERAPY: ICD-10-CM

## 2018-07-12 DIAGNOSIS — N60.01 SOLITARY CYST OF RIGHT BREAST: ICD-10-CM

## 2018-07-12 DIAGNOSIS — G56.80 OTHER SPECIFIED MONONEUROPATHIES OF UNSPECIFIED UPPER LIMB: ICD-10-CM

## 2018-07-12 DIAGNOSIS — J84.9 INTERSTITIAL PULMONARY DISEASE, UNSPECIFIED: ICD-10-CM

## 2018-07-12 DIAGNOSIS — E03.9 HYPOTHYROIDISM, UNSPECIFIED: ICD-10-CM

## 2018-07-12 DIAGNOSIS — R59.0 LOCALIZED ENLARGED LYMPH NODES: ICD-10-CM

## 2018-07-12 DIAGNOSIS — Z00.00 ENCOUNTER FOR GENERAL ADULT MEDICAL EXAMINATION WITHOUT ABNORMAL FINDINGS: ICD-10-CM

## 2018-07-12 DIAGNOSIS — M35.1 OTHER OVERLAP SYNDROMES: ICD-10-CM

## 2018-07-12 DIAGNOSIS — R00.2 PALPITATIONS: ICD-10-CM

## 2018-07-16 ENCOUNTER — RX RENEWAL (OUTPATIENT)
Age: 43
End: 2018-07-16

## 2018-07-17 ENCOUNTER — APPOINTMENT (OUTPATIENT)
Dept: CV DIAGNOSITCS | Facility: HOSPITAL | Age: 43
End: 2018-07-17

## 2018-07-19 ENCOUNTER — FORM ENCOUNTER (OUTPATIENT)
Age: 43
End: 2018-07-19

## 2018-07-20 ENCOUNTER — APPOINTMENT (OUTPATIENT)
Dept: MAMMOGRAPHY | Facility: CLINIC | Age: 43
End: 2018-07-20
Payer: COMMERCIAL

## 2018-07-20 ENCOUNTER — APPOINTMENT (OUTPATIENT)
Dept: ULTRASOUND IMAGING | Facility: CLINIC | Age: 43
End: 2018-07-20
Payer: COMMERCIAL

## 2018-07-20 ENCOUNTER — OUTPATIENT (OUTPATIENT)
Dept: OUTPATIENT SERVICES | Facility: HOSPITAL | Age: 43
LOS: 1 days | End: 2018-07-20
Payer: COMMERCIAL

## 2018-07-20 DIAGNOSIS — Z98.89 OTHER SPECIFIED POSTPROCEDURAL STATES: Chronic | ICD-10-CM

## 2018-07-20 DIAGNOSIS — R92.8 OTHER ABNORMAL AND INCONCLUSIVE FINDINGS ON DIAGNOSTIC IMAGING OF BREAST: ICD-10-CM

## 2018-07-20 DIAGNOSIS — Z00.8 ENCOUNTER FOR OTHER GENERAL EXAMINATION: ICD-10-CM

## 2018-07-20 DIAGNOSIS — N60.01 SOLITARY CYST OF RIGHT BREAST: ICD-10-CM

## 2018-07-20 PROCEDURE — 77066 DX MAMMO INCL CAD BI: CPT | Mod: 26

## 2018-07-20 PROCEDURE — 76641 ULTRASOUND BREAST COMPLETE: CPT | Mod: 26,50

## 2018-07-20 PROCEDURE — G0279: CPT | Mod: 26

## 2018-07-20 PROCEDURE — 76641 ULTRASOUND BREAST COMPLETE: CPT

## 2018-07-20 PROCEDURE — 77066 DX MAMMO INCL CAD BI: CPT

## 2018-07-20 PROCEDURE — G0279: CPT

## 2018-07-23 ENCOUNTER — TRANSCRIPTION ENCOUNTER (OUTPATIENT)
Age: 43
End: 2018-07-23

## 2018-07-25 ENCOUNTER — APPOINTMENT (OUTPATIENT)
Dept: RHEUMATOLOGY | Facility: CLINIC | Age: 43
End: 2018-07-25
Payer: COMMERCIAL

## 2018-07-25 VITALS
BODY MASS INDEX: 22.19 KG/M2 | DIASTOLIC BLOOD PRESSURE: 70 MMHG | TEMPERATURE: 98.6 F | HEART RATE: 88 BPM | SYSTOLIC BLOOD PRESSURE: 114 MMHG | HEIGHT: 60 IN | RESPIRATION RATE: 18 BRPM | OXYGEN SATURATION: 98 % | WEIGHT: 113 LBS

## 2018-07-25 PROCEDURE — 99215 OFFICE O/P EST HI 40 MIN: CPT

## 2018-07-25 RX ORDER — ZOSTER VACCINE RECOMBINANT, ADJUVANTED 50 MCG/0.5
50 KIT INTRAMUSCULAR
Qty: 1 | Refills: 1 | Status: COMPLETED | COMMUNITY
Start: 2018-03-14 | End: 2018-05-14

## 2018-07-26 ENCOUNTER — APPOINTMENT (OUTPATIENT)
Dept: PULMONOLOGY | Facility: CLINIC | Age: 43
End: 2018-07-26
Payer: COMMERCIAL

## 2018-07-26 ENCOUNTER — APPOINTMENT (OUTPATIENT)
Dept: OPHTHALMOLOGY | Facility: CLINIC | Age: 43
End: 2018-07-26

## 2018-07-26 VITALS
DIASTOLIC BLOOD PRESSURE: 70 MMHG | OXYGEN SATURATION: 97 % | RESPIRATION RATE: 17 BRPM | BODY MASS INDEX: 22.98 KG/M2 | HEART RATE: 94 BPM | HEIGHT: 59 IN | SYSTOLIC BLOOD PRESSURE: 110 MMHG | TEMPERATURE: 97.3 F | WEIGHT: 114 LBS

## 2018-07-26 PROCEDURE — 99214 OFFICE O/P EST MOD 30 MIN: CPT | Mod: 25

## 2018-07-26 PROCEDURE — 94729 DIFFUSING CAPACITY: CPT

## 2018-07-26 PROCEDURE — 94010 BREATHING CAPACITY TEST: CPT

## 2018-07-26 PROCEDURE — 94726 PLETHYSMOGRAPHY LUNG VOLUMES: CPT

## 2018-07-26 PROCEDURE — ZZZZZ: CPT

## 2018-07-27 LAB
ALBUMIN SERPL ELPH-MCNC: 4.7 G/DL
ALP BLD-CCNC: 90 U/L
ALT SERPL-CCNC: 15 U/L
ANION GAP SERPL CALC-SCNC: 20 MMOL/L
AST SERPL-CCNC: 24 U/L
BASOPHILS # BLD AUTO: 0.02 K/UL
BASOPHILS NFR BLD AUTO: 0.2 %
BILIRUB SERPL-MCNC: 0.6 MG/DL
BUN SERPL-MCNC: 11 MG/DL
CALCIUM SERPL-MCNC: 9.4 MG/DL
CHLORIDE SERPL-SCNC: 99 MMOL/L
CK SERPL-CCNC: 116 U/L
CO2 SERPL-SCNC: 20 MMOL/L
CREAT SERPL-MCNC: 0.54 MG/DL
CRP SERPL-MCNC: <0.1 MG/DL
EOSINOPHIL # BLD AUTO: 0.07 K/UL
EOSINOPHIL NFR BLD AUTO: 0.6 %
ERYTHROCYTE [SEDIMENTATION RATE] IN BLOOD BY WESTERGREN METHOD: 15 MM/HR
GGT SERPL-CCNC: 11 U/L
GLUCOSE SERPL-MCNC: 91 MG/DL
HCT VFR BLD CALC: 43.8 %
HGB BLD-MCNC: 14 G/DL
IMM GRANULOCYTES NFR BLD AUTO: 0.6 %
LDH SERPL-CCNC: 198 U/L
LYMPHOCYTES # BLD AUTO: 2.37 K/UL
LYMPHOCYTES NFR BLD AUTO: 21.7 %
MAN DIFF?: NORMAL
MCHC RBC-ENTMCNC: 26.3 PG
MCHC RBC-ENTMCNC: 32 GM/DL
MCV RBC AUTO: 82.3 FL
MONOCYTES # BLD AUTO: 0.42 K/UL
MONOCYTES NFR BLD AUTO: 3.8 %
MYOGLOBIN SERPL-MCNC: 55 NG/ML
NEUTROPHILS # BLD AUTO: 7.99 K/UL
NEUTROPHILS NFR BLD AUTO: 73.1 %
PLATELET # BLD AUTO: 217 K/UL
POTASSIUM SERPL-SCNC: 4.4 MMOL/L
PROT SERPL-MCNC: 7.6 G/DL
RBC # BLD: 5.32 M/UL
RBC # FLD: 15 %
SODIUM SERPL-SCNC: 139 MMOL/L
WBC # FLD AUTO: 10.94 K/UL

## 2018-07-30 ENCOUNTER — TRANSCRIPTION ENCOUNTER (OUTPATIENT)
Age: 43
End: 2018-07-30

## 2018-07-30 ENCOUNTER — RESULT REVIEW (OUTPATIENT)
Age: 43
End: 2018-07-30

## 2018-07-30 LAB
CHOLEST SERPL-MCNC: 184 MG/DL
CHOLEST/HDLC SERPL: 3.1 RATIO
HBA1C MFR BLD HPLC: 5.4 %
HDLC SERPL-MCNC: 59 MG/DL
LDLC SERPL CALC-MCNC: 107 MG/DL
TRIGL SERPL-MCNC: 88 MG/DL
TSH SERPL-ACNC: 0.31 UIU/ML

## 2018-07-31 LAB — ACHR BIND AB SER-ACNC: <0.3 NMOL/L

## 2018-08-07 ENCOUNTER — TRANSCRIPTION ENCOUNTER (OUTPATIENT)
Age: 43
End: 2018-08-07

## 2018-08-07 ENCOUNTER — RX RENEWAL (OUTPATIENT)
Age: 43
End: 2018-08-07

## 2018-08-15 ENCOUNTER — APPOINTMENT (OUTPATIENT)
Dept: RHEUMATOLOGY | Facility: CLINIC | Age: 43
End: 2018-08-15
Payer: COMMERCIAL

## 2018-08-15 VITALS
HEART RATE: 94 BPM | BODY MASS INDEX: 22.78 KG/M2 | WEIGHT: 113 LBS | DIASTOLIC BLOOD PRESSURE: 80 MMHG | SYSTOLIC BLOOD PRESSURE: 107 MMHG | TEMPERATURE: 98.3 F | HEIGHT: 59 IN | OXYGEN SATURATION: 98 %

## 2018-08-15 LAB — HCG UR QL: NEGATIVE

## 2018-08-15 PROCEDURE — 99214 OFFICE O/P EST MOD 30 MIN: CPT | Mod: GC

## 2018-08-17 ENCOUNTER — APPOINTMENT (OUTPATIENT)
Dept: NEUROLOGY | Facility: CLINIC | Age: 43
End: 2018-08-17
Payer: COMMERCIAL

## 2018-08-17 VITALS
HEIGHT: 59 IN | BODY MASS INDEX: 22.78 KG/M2 | DIASTOLIC BLOOD PRESSURE: 72 MMHG | WEIGHT: 113 LBS | SYSTOLIC BLOOD PRESSURE: 110 MMHG | HEART RATE: 96 BPM

## 2018-08-17 PROCEDURE — 95860 NEEDLE EMG 1 EXTREMITY: CPT

## 2018-08-17 PROCEDURE — 99215 OFFICE O/P EST HI 40 MIN: CPT | Mod: 25

## 2018-08-22 ENCOUNTER — RX RENEWAL (OUTPATIENT)
Age: 43
End: 2018-08-22

## 2018-09-26 ENCOUNTER — APPOINTMENT (OUTPATIENT)
Dept: RHEUMATOLOGY | Facility: CLINIC | Age: 43
End: 2018-09-26

## 2018-10-03 ENCOUNTER — LABORATORY RESULT (OUTPATIENT)
Age: 43
End: 2018-10-03

## 2018-10-03 ENCOUNTER — APPOINTMENT (OUTPATIENT)
Dept: RHEUMATOLOGY | Facility: CLINIC | Age: 43
End: 2018-10-03
Payer: COMMERCIAL

## 2018-10-03 VITALS
TEMPERATURE: 97.8 F | OXYGEN SATURATION: 96 % | BODY MASS INDEX: 23.18 KG/M2 | HEIGHT: 59 IN | HEART RATE: 97 BPM | WEIGHT: 115 LBS | SYSTOLIC BLOOD PRESSURE: 102 MMHG | DIASTOLIC BLOOD PRESSURE: 70 MMHG

## 2018-10-03 PROCEDURE — 99214 OFFICE O/P EST MOD 30 MIN: CPT | Mod: GC

## 2018-10-08 LAB
CORTICOSTEROID BIND GLOBULIN: 2.7 MG/DL
CORTIS SERPL-MCNC: 6 UG/DL
CORTISOL, FREE: 0.17 UG/DL
MISCELLANEOUS TEST: NORMAL
PFCX: 2.8 %
PROC NAME: NORMAL

## 2018-10-18 ENCOUNTER — INPATIENT (INPATIENT)
Facility: HOSPITAL | Age: 43
LOS: 2 days | Discharge: HOME | End: 2018-10-21
Attending: HOSPITALIST | Admitting: HOSPITALIST
Payer: COMMERCIAL

## 2018-10-18 VITALS
RESPIRATION RATE: 17 BRPM | OXYGEN SATURATION: 100 % | TEMPERATURE: 98 F | HEART RATE: 115 BPM | SYSTOLIC BLOOD PRESSURE: 105 MMHG | DIASTOLIC BLOOD PRESSURE: 76 MMHG

## 2018-10-18 DIAGNOSIS — Z98.89 OTHER SPECIFIED POSTPROCEDURAL STATES: Chronic | ICD-10-CM

## 2018-10-18 RX ORDER — SODIUM CHLORIDE 9 MG/ML
1000 INJECTION INTRAMUSCULAR; INTRAVENOUS; SUBCUTANEOUS ONCE
Qty: 0 | Refills: 0 | Status: COMPLETED | OUTPATIENT
Start: 2018-10-18 | End: 2018-10-18

## 2018-10-18 RX ADMIN — SODIUM CHLORIDE 1000 MILLILITER(S): 9 INJECTION INTRAMUSCULAR; INTRAVENOUS; SUBCUTANEOUS at 23:52

## 2018-10-18 NOTE — ED PROVIDER NOTE - PHYSICAL EXAMINATION
ATTENDING PHYSICAL EXAM DR. Banks ***GEN - NAD; A+O x3 ***HEAD - NC/AT ***EYES/NOSE - PERRL, EOMI, mucous membranes moist, no discharge ***THROAT: Oral cavity and pharynx normal. No inflammation, swelling, exudate, or lesions.  ***NECK: Neck supple, non-tender without lymphadenopathy, no masses, no JVD.   ***PULMONARY - CTA b/l, symmetric breath sounds. ***CARDIAC -s1s2, RRR, no M,R,G  ***ABDOMEN - +BS, ND, NT, soft, no guarding, no rebound, no masses   ***BACK - no CVA tenderness, Normal  spine ***EXTREMITIES - symmetric pulses, 2+ dp, capillary refill < 2 seconds, no clubbing, no cyanosis, no edema ***SKIN - noted scleroderma appearance of skin   ***NEUROLOGIC - alert, CN 2-12 intact, reflexes nl, sensation nl, coordination nl, finger to nose nl, romberg negative, motor 5/5 RUE/LUE/RLE/LLE/EHL/Plantar flexion, no pronator drift, gait nl, ***PSYCH - insight and judgment nl, memory nl, affect nl, thought nl ATTENDING PHYSICAL EXAM DR. Banks ***GEN - NAD; A+O x3 ***HEAD - NC/AT ***EYES/NOSE - PERRL, EOMI, mucous membranes moist, no discharge ***THROAT: Oral cavity and pharynx normal. No inflammation, swelling, exudate, or lesions.  ***NECK: Neck supple, non-tender without lymphadenopathy, no masses, no JVD.   ***PULMONARY - CTA b/l, symmetric breath sounds. ***CARDIAC -s1s2, RRR, no M,R,G  ***ABDOMEN - +BS, ND, soft, + tenderness to periumb area without guarding or rebound.  No tympany.     ***BACK - +CVAT b/l ***EXTREMITIES - symmetric pulses, 2+ dp, capillary refill < 2 seconds, no clubbing, no cyanosis, no edema ***SKIN - noted scleroderma appearance of skin

## 2018-10-18 NOTE — ED PROVIDER NOTE - MEDICAL DECISION MAKING DETAILS
41 y/o F with scleroderma and RA on immunosuppresant medications.  Abd pain, fever, dysuria, URI sxs x 2-3 days.  Noted tachycardia.  Check labs, u/a, ucg, pelvic, gc/chlam, abd CT.  IVNS.  Re-eval.

## 2018-10-18 NOTE — ED PROVIDER NOTE - PROGRESS NOTE DETAILS
WILI García - pt signed out to me by Dr Banks at the shift change; abd pain, fever, back pain, clinically pyelo, CT to r/o abscess pending,; since pt is immunosuppressed will start abx and admit; Klepfish: Intermittent tachycardia, borderline BP. Pt thoguh feeling much better, has no abd pain or lightheaded. Given persistent abnormal vitals, will admit for further care. 3rd L IVF. pt pmd at [facility]. updated pt. d/w hospitalist and text paged mar.

## 2018-10-18 NOTE — ED PROVIDER NOTE - OBJECTIVE STATEMENT
43 y/o F c/o mid-abd pain and dysuria since yesterday.  Abd pain radiates to b/l back.  Dysuria described as pressure sensation.  Also notes cough, nasal congestion and subjective fevers x 3 days.  Notes vaginal discharge x 2 days.  Denies n/v/d.  Never had abd pain before.  To UC today, and referred to ED as concern for pyelonephritis per UC note.  No hematuria.  Noted PMH and meds.  LMP 10/8. Attending - 41 y/o F c/o mid-abd pain and dysuria since yesterday.  Abd pain radiates to b/l back.  Dysuria described as pressure sensation.  Also notes cough, nasal congestion and subjective fevers x 3 days.  Notes vaginal discharge x 2 days.  Denies n/v/d.  Never had abd pain before.  To UC today, and referred to ED as concern for pyelonephritis per UC note.  No hematuria.  Noted PMH and meds.  LMP 10/8.

## 2018-10-19 ENCOUNTER — TRANSCRIPTION ENCOUNTER (OUTPATIENT)
Age: 43
End: 2018-10-19

## 2018-10-19 DIAGNOSIS — E03.9 HYPOTHYROIDISM, UNSPECIFIED: ICD-10-CM

## 2018-10-19 DIAGNOSIS — N12 TUBULO-INTERSTITIAL NEPHRITIS, NOT SPECIFIED AS ACUTE OR CHRONIC: ICD-10-CM

## 2018-10-19 DIAGNOSIS — N75.0 CYST OF BARTHOLIN'S GLAND: ICD-10-CM

## 2018-10-19 DIAGNOSIS — Z29.9 ENCOUNTER FOR PROPHYLACTIC MEASURES, UNSPECIFIED: ICD-10-CM

## 2018-10-19 DIAGNOSIS — M34.9 SYSTEMIC SCLEROSIS, UNSPECIFIED: ICD-10-CM

## 2018-10-19 LAB
ALBUMIN SERPL ELPH-MCNC: 4.1 G/DL — SIGNIFICANT CHANGE UP (ref 3.3–5)
ALP SERPL-CCNC: 89 U/L — SIGNIFICANT CHANGE UP (ref 40–120)
ALT FLD-CCNC: 18 U/L — SIGNIFICANT CHANGE UP (ref 4–33)
ANISOCYTOSIS BLD QL: SLIGHT — SIGNIFICANT CHANGE UP
APPEARANCE UR: SIGNIFICANT CHANGE UP
AST SERPL-CCNC: 20 U/L — SIGNIFICANT CHANGE UP (ref 4–32)
BACTERIA # UR AUTO: HIGH
BASOPHILS # BLD AUTO: 0.05 K/UL — SIGNIFICANT CHANGE UP (ref 0–0.2)
BASOPHILS NFR BLD AUTO: 0.3 % — SIGNIFICANT CHANGE UP (ref 0–2)
BASOPHILS NFR SPEC: 0 % — SIGNIFICANT CHANGE UP (ref 0–2)
BILIRUB SERPL-MCNC: 0.8 MG/DL — SIGNIFICANT CHANGE UP (ref 0.2–1.2)
BILIRUB UR-MCNC: NEGATIVE — SIGNIFICANT CHANGE UP
BLASTS # FLD: 0 % — SIGNIFICANT CHANGE UP (ref 0–0)
BLOOD UR QL VISUAL: SIGNIFICANT CHANGE UP
BUN SERPL-MCNC: 9 MG/DL — SIGNIFICANT CHANGE UP (ref 7–23)
CALCIUM SERPL-MCNC: 9.3 MG/DL — SIGNIFICANT CHANGE UP (ref 8.4–10.5)
CHLORIDE SERPL-SCNC: 101 MMOL/L — SIGNIFICANT CHANGE UP (ref 98–107)
CO2 SERPL-SCNC: 23 MMOL/L — SIGNIFICANT CHANGE UP (ref 22–31)
COLOR SPEC: YELLOW — SIGNIFICANT CHANGE UP
CREAT SERPL-MCNC: 0.51 MG/DL — SIGNIFICANT CHANGE UP (ref 0.5–1.3)
DACRYOCYTES BLD QL SMEAR: SLIGHT — SIGNIFICANT CHANGE UP
EOSINOPHIL # BLD AUTO: 0.12 K/UL — SIGNIFICANT CHANGE UP (ref 0–0.5)
EOSINOPHIL NFR BLD AUTO: 0.8 % — SIGNIFICANT CHANGE UP (ref 0–6)
EOSINOPHIL NFR FLD: 0.9 % — SIGNIFICANT CHANGE UP (ref 0–6)
EPI CELLS # UR: SIGNIFICANT CHANGE UP
GLUCOSE SERPL-MCNC: 104 MG/DL — HIGH (ref 70–99)
GLUCOSE UR-MCNC: NEGATIVE — SIGNIFICANT CHANGE UP
HCG UR-SCNC: NEGATIVE — SIGNIFICANT CHANGE UP
HCT VFR BLD CALC: 41.8 % — SIGNIFICANT CHANGE UP (ref 34.5–45)
HGB BLD-MCNC: 13.6 G/DL — SIGNIFICANT CHANGE UP (ref 11.5–15.5)
IMM GRANULOCYTES # BLD AUTO: 0.05 # — SIGNIFICANT CHANGE UP
IMM GRANULOCYTES NFR BLD AUTO: 0.3 % — SIGNIFICANT CHANGE UP (ref 0–1.5)
KETONES UR-MCNC: SIGNIFICANT CHANGE UP
LEUKOCYTE ESTERASE UR-ACNC: HIGH
LYMPHOCYTES # BLD AUTO: 17.1 % — SIGNIFICANT CHANGE UP (ref 13–44)
LYMPHOCYTES # BLD AUTO: 2.66 K/UL — SIGNIFICANT CHANGE UP (ref 1–3.3)
LYMPHOCYTES NFR SPEC AUTO: 9.7 % — LOW (ref 13–44)
MCHC RBC-ENTMCNC: 25.9 PG — LOW (ref 27–34)
MCHC RBC-ENTMCNC: 32.5 % — SIGNIFICANT CHANGE UP (ref 32–36)
MCV RBC AUTO: 79.5 FL — LOW (ref 80–100)
METAMYELOCYTES # FLD: 0 % — SIGNIFICANT CHANGE UP (ref 0–1)
MICROCYTES BLD QL: SLIGHT — SIGNIFICANT CHANGE UP
MONOCYTES # BLD AUTO: 1.79 K/UL — HIGH (ref 0–0.9)
MONOCYTES NFR BLD AUTO: 11.5 % — SIGNIFICANT CHANGE UP (ref 2–14)
MONOCYTES NFR BLD: 4.4 % — SIGNIFICANT CHANGE UP (ref 2–9)
MYELOCYTES NFR BLD: 0 % — SIGNIFICANT CHANGE UP (ref 0–0)
NEUTROPHIL AB SER-ACNC: 80.5 % — HIGH (ref 43–77)
NEUTROPHILS # BLD AUTO: 10.85 K/UL — HIGH (ref 1.8–7.4)
NEUTROPHILS NFR BLD AUTO: 70 % — SIGNIFICANT CHANGE UP (ref 43–77)
NEUTS BAND # BLD: 0.9 % — SIGNIFICANT CHANGE UP (ref 0–6)
NITRITE UR-MCNC: POSITIVE — SIGNIFICANT CHANGE UP
NRBC # FLD: 0 — SIGNIFICANT CHANGE UP
OTHER - HEMATOLOGY %: 0 — SIGNIFICANT CHANGE UP
OVALOCYTES BLD QL SMEAR: SLIGHT — SIGNIFICANT CHANGE UP
PH UR: 5 — SIGNIFICANT CHANGE UP (ref 5–8)
PLATELET # BLD AUTO: 190 K/UL — SIGNIFICANT CHANGE UP (ref 150–400)
PLATELET COUNT - ESTIMATE: NORMAL — SIGNIFICANT CHANGE UP
PMV BLD: 11.4 FL — SIGNIFICANT CHANGE UP (ref 7–13)
POIKILOCYTOSIS BLD QL AUTO: SLIGHT — SIGNIFICANT CHANGE UP
POLYCHROMASIA BLD QL SMEAR: SLIGHT — SIGNIFICANT CHANGE UP
POTASSIUM SERPL-MCNC: 4 MMOL/L — SIGNIFICANT CHANGE UP (ref 3.5–5.3)
POTASSIUM SERPL-SCNC: 4 MMOL/L — SIGNIFICANT CHANGE UP (ref 3.5–5.3)
PROMYELOCYTES # FLD: 0 % — SIGNIFICANT CHANGE UP (ref 0–0)
PROT SERPL-MCNC: 7.5 G/DL — SIGNIFICANT CHANGE UP (ref 6–8.3)
PROT UR-MCNC: 300 — HIGH
RBC # BLD: 5.26 M/UL — HIGH (ref 3.8–5.2)
RBC # FLD: 14 % — SIGNIFICANT CHANGE UP (ref 10.3–14.5)
RBC CASTS # UR COMP ASSIST: SIGNIFICANT CHANGE UP (ref 0–?)
SODIUM SERPL-SCNC: 137 MMOL/L — SIGNIFICANT CHANGE UP (ref 135–145)
SP GR SPEC: 1.03 — SIGNIFICANT CHANGE UP (ref 1–1.04)
SP GR UR: 1.03 — SIGNIFICANT CHANGE UP (ref 1–1.03)
UROBILINOGEN FLD QL: NORMAL — SIGNIFICANT CHANGE UP
VARIANT LYMPHS # BLD: 3.6 % — SIGNIFICANT CHANGE UP
WBC # BLD: 15.52 K/UL — HIGH (ref 3.8–10.5)
WBC # FLD AUTO: 15.52 K/UL — HIGH (ref 3.8–10.5)
WBC UR QL: >50 — HIGH (ref 0–?)

## 2018-10-19 PROCEDURE — 99223 1ST HOSP IP/OBS HIGH 75: CPT

## 2018-10-19 PROCEDURE — 71046 X-RAY EXAM CHEST 2 VIEWS: CPT | Mod: 26

## 2018-10-19 PROCEDURE — 74177 CT ABD & PELVIS W/CONTRAST: CPT | Mod: 26

## 2018-10-19 PROCEDURE — 99231 SBSQ HOSP IP/OBS SF/LOW 25: CPT

## 2018-10-19 RX ORDER — ALBUTEROL 90 UG/1
1 AEROSOL, METERED ORAL EVERY 6 HOURS
Qty: 0 | Refills: 0 | Status: DISCONTINUED | OUTPATIENT
Start: 2018-10-19 | End: 2018-10-21

## 2018-10-19 RX ORDER — ONDANSETRON 8 MG/1
4 TABLET, FILM COATED ORAL EVERY 6 HOURS
Qty: 0 | Refills: 0 | Status: DISCONTINUED | OUTPATIENT
Start: 2018-10-19 | End: 2018-10-21

## 2018-10-19 RX ORDER — MYCOPHENOLATE MOFETIL 250 MG/1
0 CAPSULE ORAL
Qty: 0 | Refills: 0 | COMMUNITY

## 2018-10-19 RX ORDER — MYCOPHENOLATE MOFETIL 250 MG/1
1000 CAPSULE ORAL
Qty: 0 | Refills: 0 | Status: DISCONTINUED | OUTPATIENT
Start: 2018-10-19 | End: 2018-10-19

## 2018-10-19 RX ORDER — HYDROXYCHLOROQUINE SULFATE 200 MG
100 TABLET ORAL DAILY
Qty: 0 | Refills: 0 | Status: DISCONTINUED | OUTPATIENT
Start: 2018-10-19 | End: 2018-10-21

## 2018-10-19 RX ORDER — CEFTRIAXONE 500 MG/1
1 INJECTION, POWDER, FOR SOLUTION INTRAMUSCULAR; INTRAVENOUS ONCE
Qty: 0 | Refills: 0 | Status: COMPLETED | OUTPATIENT
Start: 2018-10-19 | End: 2018-10-19

## 2018-10-19 RX ORDER — HYDROXYCHLOROQUINE SULFATE 200 MG
0.5 TABLET ORAL
Qty: 0 | Refills: 0 | COMMUNITY

## 2018-10-19 RX ORDER — SODIUM CHLORIDE 9 MG/ML
1000 INJECTION INTRAMUSCULAR; INTRAVENOUS; SUBCUTANEOUS ONCE
Qty: 0 | Refills: 0 | Status: COMPLETED | OUTPATIENT
Start: 2018-10-19 | End: 2018-10-19

## 2018-10-19 RX ORDER — SODIUM CHLORIDE 9 MG/ML
1000 INJECTION INTRAMUSCULAR; INTRAVENOUS; SUBCUTANEOUS
Qty: 0 | Refills: 0 | Status: DISCONTINUED | OUTPATIENT
Start: 2018-10-19 | End: 2018-10-20

## 2018-10-19 RX ORDER — ACETAMINOPHEN 500 MG
650 TABLET ORAL EVERY 6 HOURS
Qty: 0 | Refills: 0 | Status: DISCONTINUED | OUTPATIENT
Start: 2018-10-19 | End: 2018-10-21

## 2018-10-19 RX ORDER — ACETAMINOPHEN 500 MG
650 TABLET ORAL ONCE
Qty: 0 | Refills: 0 | Status: COMPLETED | OUTPATIENT
Start: 2018-10-19 | End: 2018-10-19

## 2018-10-19 RX ORDER — HYDROXYCHLOROQUINE SULFATE 200 MG
100 TABLET ORAL DAILY
Qty: 0 | Refills: 0 | Status: DISCONTINUED | OUTPATIENT
Start: 2018-10-19 | End: 2018-10-19

## 2018-10-19 RX ORDER — FLUCONAZOLE 150 MG/1
150 TABLET ORAL ONCE
Qty: 0 | Refills: 0 | Status: COMPLETED | OUTPATIENT
Start: 2018-10-19 | End: 2018-10-20

## 2018-10-19 RX ORDER — CEFTRIAXONE 500 MG/1
1 INJECTION, POWDER, FOR SOLUTION INTRAMUSCULAR; INTRAVENOUS EVERY 24 HOURS
Qty: 0 | Refills: 0 | Status: DISCONTINUED | OUTPATIENT
Start: 2018-10-19 | End: 2018-10-21

## 2018-10-19 RX ORDER — LEVOTHYROXINE SODIUM 125 MCG
88 TABLET ORAL DAILY
Qty: 0 | Refills: 0 | Status: DISCONTINUED | OUTPATIENT
Start: 2018-10-19 | End: 2018-10-21

## 2018-10-19 RX ORDER — CHOLECALCIFEROL (VITAMIN D3) 125 MCG
1 CAPSULE ORAL
Qty: 0 | Refills: 0 | COMMUNITY

## 2018-10-19 RX ORDER — NIFEDIPINE 30 MG
30 TABLET, EXTENDED RELEASE 24 HR ORAL DAILY
Qty: 0 | Refills: 0 | Status: DISCONTINUED | OUTPATIENT
Start: 2018-10-19 | End: 2018-10-19

## 2018-10-19 RX ADMIN — SODIUM CHLORIDE 125 MILLILITER(S): 9 INJECTION INTRAMUSCULAR; INTRAVENOUS; SUBCUTANEOUS at 07:50

## 2018-10-19 RX ADMIN — Medication 650 MILLIGRAM(S): at 05:14

## 2018-10-19 RX ADMIN — SODIUM CHLORIDE 1000 MILLILITER(S): 9 INJECTION INTRAMUSCULAR; INTRAVENOUS; SUBCUTANEOUS at 06:20

## 2018-10-19 RX ADMIN — Medication 88 MICROGRAM(S): at 10:08

## 2018-10-19 RX ADMIN — Medication 650 MILLIGRAM(S): at 06:16

## 2018-10-19 RX ADMIN — CEFTRIAXONE 100 GRAM(S): 500 INJECTION, POWDER, FOR SOLUTION INTRAMUSCULAR; INTRAVENOUS at 01:18

## 2018-10-19 RX ADMIN — SODIUM CHLORIDE 125 MILLILITER(S): 9 INJECTION INTRAMUSCULAR; INTRAVENOUS; SUBCUTANEOUS at 17:20

## 2018-10-19 RX ADMIN — SODIUM CHLORIDE 1000 MILLILITER(S): 9 INJECTION INTRAMUSCULAR; INTRAVENOUS; SUBCUTANEOUS at 06:16

## 2018-10-19 RX ADMIN — CEFTRIAXONE 100 GRAM(S): 500 INJECTION, POWDER, FOR SOLUTION INTRAMUSCULAR; INTRAVENOUS at 17:19

## 2018-10-19 RX ADMIN — SODIUM CHLORIDE 1000 MILLILITER(S): 9 INJECTION INTRAMUSCULAR; INTRAVENOUS; SUBCUTANEOUS at 05:14

## 2018-10-19 RX ADMIN — Medication 100 MILLIGRAM(S): at 13:42

## 2018-10-19 NOTE — H&P ADULT - NSHPPHYSICALEXAM_GEN_ALL_CORE
Vital Signs Last 24 Hrs  T(C): 37.5 (19 Oct 2018 06:15), Max: 37.8 (19 Oct 2018 05:08)  T(F): 99.5 (19 Oct 2018 06:15), Max: 100.1 (19 Oct 2018 05:08)  HR: 117 (19 Oct 2018 06:15) (100 - 117)  BP: 99/51 (19 Oct 2018 06:15) (99/51 - 114/45)  BP(mean): 66 (19 Oct 2018 00:45) (66 - 66)  RR: 17 (19 Oct 2018 06:15) (14 - 17)  SpO2: 100% (19 Oct 2018 06:15) (100% - 100%)    PHYSICAL EXAM:  GENERAL: NAD, well-developed  HEAD:  Atraumatic, Normocephalic  EYES: EOMI, PERRLA, conjunctiva and sclera clear  NECK: Supple, No JVD  CHEST/LUNG: Clear to auscultation bilaterally; No wheeze  HEART: Regular rate and rhythm; No murmurs, rubs, or gallops  ABDOMEN: Soft, Nontender, Nondistended; Bowel sounds present  EXTREMITIES:  2+ Peripheral Pulses, No clubbing, cyanosis, or edema  PSYCH: AAOx3  NEUROLOGY: non-focal  SKIN: No rashes or lesions Vital Signs Last 24 Hrs  T(C): 37.5 (19 Oct 2018 06:15), Max: 37.8 (19 Oct 2018 05:08)  T(F): 99.5 (19 Oct 2018 06:15), Max: 100.1 (19 Oct 2018 05:08)  HR: 117 (19 Oct 2018 06:15) (100 - 117)  BP: 99/51 (19 Oct 2018 06:15) (99/51 - 114/45)  BP(mean): 66 (19 Oct 2018 00:45) (66 - 66)  RR: 17 (19 Oct 2018 06:15) (14 - 17)  SpO2: 100% (19 Oct 2018 06:15) (100% - 100%)    PHYSICAL EXAM:  GENERAL: NAD, well-developed  HEAD:  Atraumatic, Normocephalic  EYES: EOMI, PERRLA, conjunctiva and sclera clear  NECK: Supple, No JVD  CHEST/LUNG: Clear to auscultation bilaterally; No wheeze  HEART: Regular rate and rhythm; No murmurs, rubs, or gallops  ABDOMEN: Soft, Nontender, Nondistended; Bowel sounds present +flank tenderness L>R  EXTREMITIES:  2+ Peripheral Pulses, No clubbing, cyanosis, or edema  PSYCH: AAOx3  NEUROLOGY: non-focal  SKIN: No rashes or lesions

## 2018-10-19 NOTE — H&P ADULT - PROBLEM SELECTOR PLAN 3
-hold immunosuppresants rheum consult-Dr. Ross outpt rheum -hold immunosuppressant rheum consult-Dr. Ross outpt rheum cont immunosuppressants?

## 2018-10-19 NOTE — CONSULT NOTE ADULT - PROBLEM SELECTOR RECOMMENDATION 9
- no acute gyn intervention   -not source on infection.   -can tx for candidiasis, Fluconazole 150mg po x1 - no acute gyn intervention   -not source on infection.   -can tx for candidiasis, Fluconazole 150mg po x1    Pt to be evaluated by Dr. Tara Chaney, pgy2

## 2018-10-19 NOTE — CHART NOTE - NSCHARTNOTEFT_GEN_A_CORE
Patient has hx of RA and scleroderma. Called Dr. Ross to discuss immunosuppressant medication. She instructed to hold cellucept but okay to continue with Plaquenil.

## 2018-10-19 NOTE — H&P ADULT - PROBLEM SELECTOR PLAN 4
IMPROVE VTE Individual Risk Assessment    RISK                                                          Points  [] Previous VTE                                           3  [] Thrombophilia                                        2  [] Lower limb paralysis                              2   [] Current Cancer                                       2   [] Immobilization > 24 hrs                        1  [] ICU/CCU stay > 24 hours                       1  [] Age > 60                                                   1    IMPROVE VTE Score: 0 venodyne boots

## 2018-10-19 NOTE — CONSULT NOTE ADULT - ASSESSMENT
41yo  (LMP=10/3/18) pmh significant for scleroderma and RA presenting with fever at home, abdominal pain, and dysuria for the past 2-3 days. Found to have pyelonephritis. On CT A/P she was found to have a 1 cm hypodense lesion in the vagina. Physical exam consistent with a bartholin cyst. Area is non-painful, nonerythematous and without symptoms.

## 2018-10-19 NOTE — CONSULT NOTE ADULT - SUBJECTIVE AND OBJECTIVE BOX
R2 GYN ED CONSULT NOTE    SUBJECTIVE:    41yo  (LMP=10/3/18) pmh significant for scleroderma and RA presenting with fever at home, abdominal pain, and dysuria for the past 2-3 days. She also had some right flank pain and weakness. She had a 101 fever at home,      Pt denies nausea, vomiting, diarrhea, headache, constipation, dizzyness, syncope, chest pain, palpitations, shortness of breath, dysuria, urgency, frequency, vaginal bleeding/discharge/odor/pain/itching,    In ED today she received IV hydration and     Primary OB/GYN:  OBH:  GYNH: denies hx of fibroids, ov cysts, abnl paps, sti  PMSH:  MEDS: none  ALL: nkda  SOCH: denies t/e/d; safe at home  FAMH: denies fam hx of breast/uterine/ovarian/colon cancer  ROS: negative except per hpi    OBJECTIVE:    VITAL SIGNS:  Vital Signs Last 24 Hrs  T(C): 37.5 (19 Oct 2018 10:00), Max: 37.8 (19 Oct 2018 05:08)  T(F): 99.5 (19 Oct 2018 10:00), Max: 100.1 (19 Oct 2018 05:08)  HR: 98 (19 Oct 2018 10:00) (98 - 117)  BP: 94/54 (19 Oct 2018 10:00) (94/54 - 114/45)  BP(mean): 66 (19 Oct 2018 00:45) (66 - 66)  RR: 16 (19 Oct 2018 10:00) (14 - 17)  SpO2: 99% (19 Oct 2018 10:00) (99% - 100%)    CAPILLARY BLOOD GLUCOSE            PHYSICAL EXAM:  GEN: NAD, A&Ox3  CV: RRR, no m/g/r  LUNGS: CTAB  ABD: soft, NT, ND, +BS  SPECULUM:  PELVIC:  EXT: WWP, no edema/TTP    LABS:                        13.6   15.52 )-----------( 190      ( 18 Oct 2018 23:55 )             41.8   baso 0.3    eos 0.8    imm gran 0.3    lymph 17.1   mono 11.5   poly 70.0       10-18    137  |  101  |  9   ----------------------------<  104<H>  4.0   |  23  |  0.51    Ca    9.3      18 Oct 2018 23:55    TPro  7.5  /  Alb  4.1  /  TBili  0.8  /  DBili  x   /  AST  20  /  ALT  18  /  AlkPhos  89  10-18      Urinalysis Basic - ( 18 Oct 2018 23:55 )    Color: YELLOW / Appearance: TURBID / S.030 / pH: 5.0  Gluc: NEGATIVE / Ketone: TRACE  / Bili: NEGATIVE / Urobili: NORMAL   Blood: LARGE / Protein: 300 / Nitrite: POSITIVE   Leuk Esterase: MODERATE / RBC: 25-50 / WBC >50   Sq Epi: x / Non Sq Epi: MODERATE / Bacteria: MODERATE        RADIOLOGY:    ASSESSMENT:    RECOMMENDATIONS: R2 GYN ED CONSULT NOTE    SUBJECTIVE:    41yo  (LMP=10/3/18) pmh significant for scleroderma and RA presenting with fever at home, abdominal pain, and dysuria for the past 2-3 days. She also had some right flank pain and weakness. She had a 101 fever at home,    She has not noticed any vaginal pain. Some vaginal itchiness. She is sexually active with her . No hx of stds. Uses condoms.     Pt denies nausea, vomiting, diarrhea, headache, constipation, dizzyness, syncope, chest pain, palpitations, shortness of breath, dysuria, urgency, frequency, vaginal bleeding/discharge/odor/pain/itching,    In ED today she received IV hydration and  ceftriaxone.     Primary OB/GYN:  OBH:  X1 C/S x1  GYNH: denies hx of fibroids, ov cysts, abnl paps, sti  PMSH:scleroderma and RA  MEDS: none  ALL: nkda  SOCH: denies t/e/d; safe at home  FAMH: denies fam hx of breast/uterine/ovarian/colon cancer  ROS: negative except per hpi    OBJECTIVE:    VITAL SIGNS:  Vital Signs Last 24 Hrs  T(C): 37.5 (19 Oct 2018 10:00), Max: 37.8 (19 Oct 2018 05:08)  T(F): 99.5 (19 Oct 2018 10:00), Max: 100.1 (19 Oct 2018 05:08)  HR: 98 (19 Oct 2018 10:00) (98 - 117)  BP: 94/54 (19 Oct 2018 10:00) (94/54 - 114/45)  BP(mean): 66 (19 Oct 2018 00:45) (66 - 66)  RR: 16 (19 Oct 2018 10:00) (14 - 17)  SpO2: 99% (19 Oct 2018 10:00) (99% - 100%)    CAPILLARY BLOOD GLUCOSE            PHYSICAL EXAM:  GEN: NAD, A&Ox3  CV: RRR, no m/g/r  LUNGS: CTAB  ABD: soft, NT, ND, +BS  : small right bartholin cyst. non painful to palpation. no erythema No discharge.   SPECULUM: closed os. white dicharge suggestive of candidiasis   PELVIC: no cmt. no adnexal masses or tenderss   EXT: WWP, no edema/TTP    LABS:                        13.6   15.52 )-----------( 190      ( 18 Oct 2018 23:55 )             41.8   baso 0.3    eos 0.8    imm gran 0.3    lymph 17.1   mono 11.5   poly 70.0       10-18    137  |  101  |  9   ----------------------------<  104<H>  4.0   |  23  |  0.51    Ca    9.3      18 Oct 2018 23:55    TPro  7.5  /  Alb  4.1  /  TBili  0.8  /  DBili  x   /  AST  20  /  ALT  18  /  AlkPhos  89  10-18      Urinalysis Basic - ( 18 Oct 2018 23:55 )    Color: YELLOW / Appearance: TURBID / S.030 / pH: 5.0  Gluc: NEGATIVE / Ketone: TRACE  / Bili: NEGATIVE / Urobili: NORMAL   Blood: LARGE / Protein: 300 / Nitrite: POSITIVE   Leuk Esterase: MODERATE / RBC: 25-50 / WBC >50   Sq Epi: x / Non Sq Epi: MODERATE / Bacteria: MODERATE        RADIOLOGY:  < from: CT Abdomen and Pelvis w/ Oral Cont and w/ IV Cont (10.. @ 04:55) >    EXAM:  CT ABDOMEN AND PELVIS OC IC        PROCEDURE DATE:  Oct 19 2018         INTERPRETATION:  CLINICAL INFORMATION: History of scleroderma and   rheumatoid arthritis with abdominal pain.    COMPARISON: CT chest 3/23/2017.    PROCEDURE:   CT of the Abdomen and Pelvis was performed with intravenous contrast.   Intravenous contrast: 90 ml Omnipaque 350. 10 ml discarded.  Oral contrast: positive contrast was administered.  Sagittal and coronal reformats were performed.    FINDINGS:    LOWER CHEST: Within normal limits.    LIVER: Within normal limits.  BILE DUCTS: Normal caliber.  GALLBLADDER: Within normal limits.  SPLEEN: Within normal limits.  PANCREAS: Within normal limits.  ADRENALS: Within normal limits.  KIDNEYS/URETERS: Striated appearance of the bilateral renal cortices with   enhancement of the bilateral ureteral walls. No evidence of   hydronephrosis. Nonobstructing right renal calculi measuring 2 to 3 mm.   Small left upper pole cortical defect unchanged compared to prior CT of  3/23/2017.     BLADDER: Within normal limits.  REPRODUCTIVE ORGANS: The uterus and bilateral adnexa are within normal   limits. 1.6 cm fundal subserosal fibroid.  A 1.9 x 1.4 cm right corpus   luteal cyst.    BOWEL: No bowel obstruction. Appendix was not visualized, however there   are no secondary signs of appendicitis. A 1.7 x 1.4 cm hypodense lesion   in the right posterior aspect of the vagina below the pubic symphysis   suggestive of a Bartholin's gland cyst.  PERITONEUM: No ascites.  VESSELS:  Within normal limits.  RETROPERITONEUM: No lymphadenopathy.    ABDOMINAL WALL: Within normal limits.  BONES: Within normal limits.    IMPRESSION:     Striated appearance of the bilateral renal cortices and enhancement of   the ureteral walls compatible with bilateral pyelonephritis and   ureteritis.                  GUTIERREZ PORTER M.D., RADIOLOGY RESIDENT  This document has been electronically signed.  MELISSA MONROE M.D, ATTENDING RADIOLOGIST  This document has been electronically signed. Oct 19 2018  5:40AM                  < end of copied text >      RECOMMENDATIONS:

## 2018-10-19 NOTE — H&P ADULT - NSHPLABSRESULTS_GEN_ALL_CORE
13.6   15.52 )-----------( 190      ( 18 Oct 2018 23:55 )             41.8       10-18    137  |  101  |  9   ----------------------------<  104<H>  4.0   |  23  |  0.51    Ca    9.3      18 Oct 2018 23:55    TPro  7.5  /  Alb  4.1  /  TBili  0.8  /  DBili  x   /  AST  20  /  ALT  18  /  AlkPhos  89  10-18      CAPILLARY BLOOD GLUCOSE          Urinalysis Basic - ( 18 Oct 2018 23:55 )    Color: YELLOW / Appearance: TURBID / S.030 / pH: 5.0  Gluc: NEGATIVE / Ketone: TRACE  / Bili: NEGATIVE / Urobili: NORMAL   Blood: LARGE / Protein: 300 / Nitrite: POSITIVE   Leuk Esterase: MODERATE / RBC: 25-50 / WBC >50   Sq Epi: x / Non Sq Epi: MODERATE / Bacteria: MODERATE            Radiology  < from: Xray Chest 2 Views PA/Lat (10.19.18 @ 03:18) >    EXAM:  XR CHEST PA LAT 2V        PROCEDURE DATE:  Oct 19 2018         INTERPRETATION:  CLINICAL INFORMATION: Cough.    TECHNIQUE: PA and lateral views of the chest 10/19/2018.    COMPARISON: Chest radiograph 2018.     FINDINGS:     Stable 4 mmcalcified granuloma in the right upper lobe.   There are no focal lung consolidations. There is no pneumothorax, no   pleural effusions.   Cardiac size is within normal limits.  The visualized osseous structures demonstrate no acute abnormality.    IMPRESSION:   Clear lungs.      < end of copied text >    < from: CT Abdomen and Pelvis w/ Oral Cont and w/ IV Cont (10.19.18 @ 04:55) >    EXAM:  CT ABDOMEN AND PELVIS OC IC        PROCEDURE DATE:  Oct 19 2018             IMPRESSION:     Striated appearance of the bilateral renal cortices and enhancement of   the ureteral walls compatible with bilateral pyelonephritis and   ureteritis.    < end of copied text >

## 2018-10-19 NOTE — H&P ADULT - HISTORY OF PRESENT ILLNESS
Pt is a 43 yo F w/ hx scleroderma/RA on immunosuppressants p/w abdominal pain. Pt states approx 2-3 days ago noted suprapubic abdominal pain with radiation to flank and associated pressure on urination, frequency and whitish discharge. She subsequently noted weakness and body aches with associated fever of 101. She went to urgent care and was referred to ED. In the ED she recieved 3 L NS and ceftriaxone

## 2018-10-19 NOTE — H&P ADULT - PROBLEM SELECTOR PLAN 1
-f/u Ucx  -pt states monongamous with  agreeable to HIV will send urine G/C r/o STD. CT A/P- suggesting RT bartholin's cysts given immunosuppresants with drainage and fever will have GYN evaluate if need I & D.  -cont ceftriaxone -f/u Ucx  -pt states monongamous with  agreeable to HIV will send urine G/C r/o STD. CT A/P- suggesting RT bartholin's cysts given immunosuppressant with drainage and fever will have GYN evaluate if need I & D.  -cont ceftriaxone

## 2018-10-19 NOTE — H&P ADULT - ASSESSMENT
Pt is 41 yo F w/ hx RA/scleroderma on immunosuppressant p/w pyelonephritis. UA+ +nitrite/LE WBC>50. +whitish vaginal discharge. CT A/P-+pyelonephritis  A 1.7 x 1.4 cm hypodense lesion in the right posterior aspect of the vagina below the pubic symphysis suggestive of a Bartholin's gland cyst. Pt is 43 yo F w/ hx RA/scleroderma on immunosuppressant p/w pyelonephritis. UA+ +nitrite/LE WBC>50. +whitish vaginal discharge. CT A/P-+pyelonephritis  A 1.7 x 1.4 cm hypodense lesion in the right posterior aspect of the vagina below the pubic symphysis suggestive of a Bartholin's gland cyst. +WBC >15 HR >100 T>100.4 meets SIRS sepsis likely due to UTI

## 2018-10-19 NOTE — H&P ADULT - NSHPREVIEWOFSYSTEMS_GEN_ALL_CORE
Review of Systems:   CONSTITUTIONAL: No fever, weight loss, or fatigue  EYES: No eye pain, visual disturbances, or discharge  ENMT:  No difficulty hearing, tinnitus, vertigo; No sinus or throat pain  NECK: No pain or stiffness  BREASTS: No pain, masses, or nipple discharge  RESPIRATORY: No cough, wheezing, chills or hemoptysis; No shortness of breath  CARDIOVASCULAR: No chest pain, palpitations, dizziness, or leg swelling  GASTROINTESTINAL: No abdominal or epigastric pain. No nausea, vomiting, or hematemesis; No diarrhea or constipation. No melena or hematochezia.  GENITOURINARY: No dysuria, frequency, hematuria, or incontinence  NEUROLOGICAL: No headaches, memory loss, loss of strength, numbness, or tremors  SKIN: No itching, burning, rashes, or lesions   LYMPH NODES: No enlarged glands  ENDOCRINE: No heat or cold intolerance; No hair loss  MUSCULOSKELETAL: No joint pain or swelling; No muscle, back, or extremity pain  PSYCHIATRIC: No depression, anxiety, mood swings, or difficulty sleeping  HEME/LYMPH: No easy bruising, or bleeding gums  ALLERY AND IMMUNOLOGIC: No hives or eczema Review of Systems:   CONSTITUTIONAL: No, weight loss, or fatigue +fever  EYES: No eye pain, visual disturbances, or discharge  ENMT:  No difficulty hearing, tinnitus, vertigo; No sinus or throat pain  NECK: No pain or stiffness  BREASTS: No pain, masses, or nipple discharge  RESPIRATORY: No, wheezing, chills or hemoptysis; No shortness of breath +cough   CARDIOVASCULAR: No chest pain, palpitations, dizziness, or leg swelling  GASTROINTESTINAL: No epigastric pain. No nausea, vomiting, or hematemesis; No diarrhea or constipation. No melena or hematochezia.  GENITOURINARY: No hematuria, or incontinence +dsyuria/frequency  NEUROLOGICAL: No headaches, memory loss, loss of strength, numbness, or tremors  SKIN: No itching, burning, rashes, or lesions   LYMPH NODES: No enlarged glands  ENDOCRINE: No heat or cold intolerance; No hair loss  MUSCULOSKELETAL: No joint pain or swelling; No muscle, back, or extremity pain  PSYCHIATRIC: No depression, anxiety, mood swings, or difficulty sleeping  HEME/LYMPH: No easy bruising, or bleeding gums  ALLERY AND IMMUNOLOGIC: No hives or eczema

## 2018-10-20 LAB
ALBUMIN SERPL ELPH-MCNC: 3.3 G/DL — SIGNIFICANT CHANGE UP (ref 3.3–5)
ALP SERPL-CCNC: 73 U/L — SIGNIFICANT CHANGE UP (ref 40–120)
ALT FLD-CCNC: 15 U/L — SIGNIFICANT CHANGE UP (ref 4–33)
AST SERPL-CCNC: 21 U/L — SIGNIFICANT CHANGE UP (ref 4–32)
BASOPHILS # BLD AUTO: 0.05 K/UL — SIGNIFICANT CHANGE UP (ref 0–0.2)
BASOPHILS NFR BLD AUTO: 0.5 % — SIGNIFICANT CHANGE UP (ref 0–2)
BILIRUB SERPL-MCNC: 0.7 MG/DL — SIGNIFICANT CHANGE UP (ref 0.2–1.2)
BUN SERPL-MCNC: 6 MG/DL — LOW (ref 7–23)
BUN SERPL-MCNC: 6 MG/DL — LOW (ref 7–23)
C TRACH RRNA SPEC QL NAA+PROBE: SIGNIFICANT CHANGE UP
CALCIUM SERPL-MCNC: 8.5 MG/DL — SIGNIFICANT CHANGE UP (ref 8.4–10.5)
CALCIUM SERPL-MCNC: 8.5 MG/DL — SIGNIFICANT CHANGE UP (ref 8.4–10.5)
CHLORIDE SERPL-SCNC: 105 MMOL/L — SIGNIFICANT CHANGE UP (ref 98–107)
CHLORIDE SERPL-SCNC: 105 MMOL/L — SIGNIFICANT CHANGE UP (ref 98–107)
CO2 SERPL-SCNC: 19 MMOL/L — LOW (ref 22–31)
CO2 SERPL-SCNC: 19 MMOL/L — LOW (ref 22–31)
CREAT SERPL-MCNC: 0.45 MG/DL — LOW (ref 0.5–1.3)
CREAT SERPL-MCNC: 0.45 MG/DL — LOW (ref 0.5–1.3)
EOSINOPHIL # BLD AUTO: 0.16 K/UL — SIGNIFICANT CHANGE UP (ref 0–0.5)
EOSINOPHIL NFR BLD AUTO: 1.5 % — SIGNIFICANT CHANGE UP (ref 0–6)
GLUCOSE SERPL-MCNC: 83 MG/DL — SIGNIFICANT CHANGE UP (ref 70–99)
GLUCOSE SERPL-MCNC: 83 MG/DL — SIGNIFICANT CHANGE UP (ref 70–99)
HCT VFR BLD CALC: 36.9 % — SIGNIFICANT CHANGE UP (ref 34.5–45)
HCT VFR BLD CALC: 36.9 % — SIGNIFICANT CHANGE UP (ref 34.5–45)
HGB BLD-MCNC: 12 G/DL — SIGNIFICANT CHANGE UP (ref 11.5–15.5)
HGB BLD-MCNC: 12 G/DL — SIGNIFICANT CHANGE UP (ref 11.5–15.5)
IMM GRANULOCYTES # BLD AUTO: 0.04 # — SIGNIFICANT CHANGE UP
IMM GRANULOCYTES NFR BLD AUTO: 0.4 % — SIGNIFICANT CHANGE UP (ref 0–1.5)
LYMPHOCYTES # BLD AUTO: 2.42 K/UL — SIGNIFICANT CHANGE UP (ref 1–3.3)
LYMPHOCYTES # BLD AUTO: 22.1 % — SIGNIFICANT CHANGE UP (ref 13–44)
MCHC RBC-ENTMCNC: 26.5 PG — LOW (ref 27–34)
MCHC RBC-ENTMCNC: 26.5 PG — LOW (ref 27–34)
MCHC RBC-ENTMCNC: 32.5 % — SIGNIFICANT CHANGE UP (ref 32–36)
MCHC RBC-ENTMCNC: 32.5 % — SIGNIFICANT CHANGE UP (ref 32–36)
MCV RBC AUTO: 81.5 FL — SIGNIFICANT CHANGE UP (ref 80–100)
MCV RBC AUTO: 81.5 FL — SIGNIFICANT CHANGE UP (ref 80–100)
MONOCYTES # BLD AUTO: 1.28 K/UL — HIGH (ref 0–0.9)
MONOCYTES NFR BLD AUTO: 11.7 % — SIGNIFICANT CHANGE UP (ref 2–14)
N GONORRHOEA RRNA SPEC QL NAA+PROBE: SIGNIFICANT CHANGE UP
NEUTROPHILS # BLD AUTO: 7.01 K/UL — SIGNIFICANT CHANGE UP (ref 1.8–7.4)
NEUTROPHILS NFR BLD AUTO: 63.8 % — SIGNIFICANT CHANGE UP (ref 43–77)
NRBC # FLD: 0 — SIGNIFICANT CHANGE UP
NRBC # FLD: 0 — SIGNIFICANT CHANGE UP
PLATELET # BLD AUTO: 152 K/UL — SIGNIFICANT CHANGE UP (ref 150–400)
PLATELET # BLD AUTO: 152 K/UL — SIGNIFICANT CHANGE UP (ref 150–400)
PMV BLD: 12.2 FL — SIGNIFICANT CHANGE UP (ref 7–13)
PMV BLD: 12.2 FL — SIGNIFICANT CHANGE UP (ref 7–13)
POTASSIUM SERPL-MCNC: 3.8 MMOL/L — SIGNIFICANT CHANGE UP (ref 3.5–5.3)
POTASSIUM SERPL-MCNC: 3.8 MMOL/L — SIGNIFICANT CHANGE UP (ref 3.5–5.3)
POTASSIUM SERPL-SCNC: 3.8 MMOL/L — SIGNIFICANT CHANGE UP (ref 3.5–5.3)
POTASSIUM SERPL-SCNC: 3.8 MMOL/L — SIGNIFICANT CHANGE UP (ref 3.5–5.3)
PROT SERPL-MCNC: 6.4 G/DL — SIGNIFICANT CHANGE UP (ref 6–8.3)
RBC # BLD: 4.53 M/UL — SIGNIFICANT CHANGE UP (ref 3.8–5.2)
RBC # BLD: 4.53 M/UL — SIGNIFICANT CHANGE UP (ref 3.8–5.2)
RBC # FLD: 14.1 % — SIGNIFICANT CHANGE UP (ref 10.3–14.5)
RBC # FLD: 14.1 % — SIGNIFICANT CHANGE UP (ref 10.3–14.5)
SODIUM SERPL-SCNC: 138 MMOL/L — SIGNIFICANT CHANGE UP (ref 135–145)
SODIUM SERPL-SCNC: 138 MMOL/L — SIGNIFICANT CHANGE UP (ref 135–145)
SPECIMEN SOURCE: SIGNIFICANT CHANGE UP
WBC # BLD: 10.96 K/UL — HIGH (ref 3.8–10.5)
WBC # BLD: 10.96 K/UL — HIGH (ref 3.8–10.5)
WBC # FLD AUTO: 10.96 K/UL — HIGH (ref 3.8–10.5)
WBC # FLD AUTO: 10.96 K/UL — HIGH (ref 3.8–10.5)

## 2018-10-20 PROCEDURE — 99233 SBSQ HOSP IP/OBS HIGH 50: CPT

## 2018-10-20 RX ORDER — SODIUM CHLORIDE 9 MG/ML
1000 INJECTION INTRAMUSCULAR; INTRAVENOUS; SUBCUTANEOUS
Qty: 0 | Refills: 0 | Status: DISCONTINUED | OUTPATIENT
Start: 2018-10-20 | End: 2018-10-21

## 2018-10-20 RX ORDER — INFLUENZA VIRUS VACCINE 15; 15; 15; 15 UG/.5ML; UG/.5ML; UG/.5ML; UG/.5ML
0.5 SUSPENSION INTRAMUSCULAR ONCE
Qty: 0 | Refills: 0 | Status: COMPLETED | OUTPATIENT
Start: 2018-10-20 | End: 2018-10-21

## 2018-10-20 RX ADMIN — FLUCONAZOLE 150 MILLIGRAM(S): 150 TABLET ORAL at 05:13

## 2018-10-20 RX ADMIN — Medication 88 MICROGRAM(S): at 05:13

## 2018-10-20 RX ADMIN — CEFTRIAXONE 100 GRAM(S): 500 INJECTION, POWDER, FOR SOLUTION INTRAMUSCULAR; INTRAVENOUS at 16:43

## 2018-10-20 RX ADMIN — Medication 100 MILLIGRAM(S): at 12:09

## 2018-10-20 NOTE — PROGRESS NOTE ADULT - ASSESSMENT
Pt is 41 yo F w/ hx RA/scleroderma on immunosuppressant p/w pyelonephritis. UA+ +nitrite/LE WBC>50. +whitish vaginal discharge. CT A/P-+pyelonephritis  A 1.7 x 1.4 cm hypodense lesion in the right posterior aspect of the vagina below the pubic symphysis suggestive of a Bartholin's gland cyst. +WBC >15 HR >100 T>100.4 meets SIRS sepsis likely due to UTI

## 2018-10-20 NOTE — PROGRESS NOTE ADULT - SUBJECTIVE AND OBJECTIVE BOX
Patient is a 42y old  Female who presents with a chief complaint of abdominal pain    SUBJECTIVE / OVERNIGHT EVENTS:    doing well  no fevers, tolerating diet  no CP, SOB, no nausea/vomiting, diarrhea  mild burning with urination     MEDICATIONS  (STANDING):  cefTRIAXone   IVPB 1 Gram(s) IV Intermittent every 24 hours  hydroxychloroquine 100 milliGRAM(s) Oral daily  influenza   Vaccine 0.5 milliLiter(s) IntraMuscular once  levothyroxine 88 MICROGram(s) Oral daily  sodium chloride 0.9%. 1000 milliLiter(s) (75 mL/Hr) IV Continuous <Continuous>    MEDICATIONS  (PRN):  acetaminophen   Tablet .. 650 milliGRAM(s) Oral every 6 hours PRN Temp greater or equal to 38C (100.4F)  ALBUTerol    90 MICROgram(s) HFA Inhaler 1 Puff(s) Inhalation every 6 hours PRN Shortness of Breath and/or Wheezing  guaiFENesin    Syrup 200 milliGRAM(s) Oral every 6 hours PRN Cough  ondansetron Injectable 4 milliGRAM(s) IV Push every 6 hours PRN Nausea    T(C): 37.1 (10-20-18 @ 04:51), Max: 37.3 (10-19-18 @ 19:45)  HR: 101 (10-20-18 @ 04:51) (101 - 114)  BP: 105/68 (10-20-18 @ 04:51) (105/68 - 108/69)  RR: 16 (10-20-18 @ 04:51) (16 - 16)  SpO2: 100% (10-20-18 @ 04:51) (100% - 100%)      PHYSICAL EXAM:  GENERAL: NAD, well-developed  NECK: Supple, No JVD  CHEST/LUNG: Clear to auscultation bilaterally; No wheeze  HEART: Regular rate and rhythm; No murmurs, rubs, or gallops  ABDOMEN: Soft, Nontender, Nondistended; Bowel sounds present  EXTREMITIES:   warm and well perfused, No clubbing, cyanosis, or edema  BACK: left CVA tenderness  PSYCH: AAOx3  NEUROLOGY: non-focal    LABS:                        12.0   10.96 )-----------( 152      ( 20 Oct 2018 06:00 )             36.9     10-20    138  |  105  |  6<L>  ----------------------------<  83  3.8   |  19<L>  |  0.45<L>    Ca    8.5      20 Oct 2018 06:00    TPro  6.4  /  Alb  3.3  /  TBili  0.7  /  DBili  x   /  AST  21  /  ALT  15  /  AlkPhos  73  10-20          Urinalysis Basic - ( 18 Oct 2018 23:55 )    Color: YELLOW / Appearance: TURBID / S.030 / pH: 5.0  Gluc: NEGATIVE / Ketone: TRACE  / Bili: NEGATIVE / Urobili: NORMAL   Blood: LARGE / Protein: 300 / Nitrite: POSITIVE   Leuk Esterase: MODERATE / RBC: 25-50 / WBC >50   Sq Epi: x / Non Sq Epi: MODERATE / Bacteria: MODERATE        Microbiology:       RADIOLOGY & ADDITIONAL TESTS:    Imaging Personally Reviewed:    Consultant(s) Notes Reviewed:      Care Discussed with Consultants/Other Providers: Patient is a 42y old  Female who presents with a chief complaint of abdominal pain    SUBJECTIVE / OVERNIGHT EVENTS:    doing well  no fevers, tolerating diet  no CP, SOB, no nausea/vomiting, diarrhea  mild burning with urination   denies history of UTIs, cannot even remember last one, no h/p pyelo     MEDICATIONS  (STANDING):  cefTRIAXone   IVPB 1 Gram(s) IV Intermittent every 24 hours  hydroxychloroquine 100 milliGRAM(s) Oral daily  influenza   Vaccine 0.5 milliLiter(s) IntraMuscular once  levothyroxine 88 MICROGram(s) Oral daily  sodium chloride 0.9%. 1000 milliLiter(s) (75 mL/Hr) IV Continuous <Continuous>    MEDICATIONS  (PRN):  acetaminophen   Tablet .. 650 milliGRAM(s) Oral every 6 hours PRN Temp greater or equal to 38C (100.4F)  ALBUTerol    90 MICROgram(s) HFA Inhaler 1 Puff(s) Inhalation every 6 hours PRN Shortness of Breath and/or Wheezing  guaiFENesin    Syrup 200 milliGRAM(s) Oral every 6 hours PRN Cough  ondansetron Injectable 4 milliGRAM(s) IV Push every 6 hours PRN Nausea    T(C): 37.1 (10-20-18 @ 04:51), Max: 37.3 (10-19-18 @ 19:45)  HR: 101 (10-20-18 @ 04:51) (101 - 114)  BP: 105/68 (10-20-18 @ 04:51) (105/68 - 108/69)  RR: 16 (10-20-18 @ 04:51) (16 - 16)  SpO2: 100% (10-20-18 @ 04:51) (100% - 100%)      PHYSICAL EXAM:  GENERAL: NAD, well-developed  NECK: Supple, No JVD  CHEST/LUNG: Clear to auscultation bilaterally; No wheeze  HEART: Regular rate and rhythm; No murmurs, rubs, or gallops  ABDOMEN: Soft, Nontender, Nondistended; Bowel sounds present  EXTREMITIES:   warm and well perfused, No clubbing, cyanosis, or edema  BACK: left CVA tenderness  PSYCH: AAOx3  NEUROLOGY: non-focal    LABS:                        12.0   10.96 )-----------( 152      ( 20 Oct 2018 06:00 )             36.9     10-20    138  |  105  |  6<L>  ----------------------------<  83  3.8   |  19<L>  |  0.45<L>    Ca    8.5      20 Oct 2018 06:00    TPro  6.4  /  Alb  3.3  /  TBili  0.7  /  DBili  x   /  AST  21  /  ALT  15  /  AlkPhos  73  10          Urinalysis Basic - ( 18 Oct 2018 23:55 )    Color: YELLOW / Appearance: TURBID / S.030 / pH: 5.0  Gluc: NEGATIVE / Ketone: TRACE  / Bili: NEGATIVE / Urobili: NORMAL   Blood: LARGE / Protein: 300 / Nitrite: POSITIVE   Leuk Esterase: MODERATE / RBC: 25-50 / WBC >50   Sq Epi: x / Non Sq Epi: MODERATE / Bacteria: MODERATE        Microbiology:       RADIOLOGY & ADDITIONAL TESTS:    Imaging Personally Reviewed:    Consultant(s) Notes Reviewed:      Care Discussed with Consultants/Other Providers:

## 2018-10-21 ENCOUNTER — RX RENEWAL (OUTPATIENT)
Age: 43
End: 2018-10-21

## 2018-10-21 ENCOUNTER — TRANSCRIPTION ENCOUNTER (OUTPATIENT)
Age: 43
End: 2018-10-21

## 2018-10-21 VITALS
TEMPERATURE: 98 F | OXYGEN SATURATION: 100 % | HEART RATE: 90 BPM | DIASTOLIC BLOOD PRESSURE: 57 MMHG | RESPIRATION RATE: 17 BRPM | SYSTOLIC BLOOD PRESSURE: 106 MMHG

## 2018-10-21 DIAGNOSIS — B37.3 CANDIDIASIS OF VULVA AND VAGINA: ICD-10-CM

## 2018-10-21 LAB
HCT VFR BLD CALC: 35 % — SIGNIFICANT CHANGE UP (ref 34.5–45)
HGB BLD-MCNC: 11.3 G/DL — LOW (ref 11.5–15.5)
MCHC RBC-ENTMCNC: 26 PG — LOW (ref 27–34)
MCHC RBC-ENTMCNC: 32.3 % — SIGNIFICANT CHANGE UP (ref 32–36)
MCV RBC AUTO: 80.5 FL — SIGNIFICANT CHANGE UP (ref 80–100)
NRBC # FLD: 0 — SIGNIFICANT CHANGE UP
PLATELET # BLD AUTO: 156 K/UL — SIGNIFICANT CHANGE UP (ref 150–400)
PMV BLD: 11.7 FL — SIGNIFICANT CHANGE UP (ref 7–13)
RBC # BLD: 4.35 M/UL — SIGNIFICANT CHANGE UP (ref 3.8–5.2)
RBC # FLD: 13.9 % — SIGNIFICANT CHANGE UP (ref 10.3–14.5)
WBC # BLD: 9.31 K/UL — SIGNIFICANT CHANGE UP (ref 3.8–10.5)
WBC # FLD AUTO: 9.31 K/UL — SIGNIFICANT CHANGE UP (ref 3.8–10.5)

## 2018-10-21 PROCEDURE — 99239 HOSP IP/OBS DSCHRG MGMT >30: CPT

## 2018-10-21 RX ORDER — CEFPODOXIME PROXETIL 100 MG
1 TABLET ORAL
Qty: 20 | Refills: 0 | OUTPATIENT
Start: 2018-10-21 | End: 2018-10-30

## 2018-10-21 RX ORDER — IBUPROFEN 200 MG
400 TABLET ORAL EVERY 6 HOURS
Qty: 0 | Refills: 0 | Status: DISCONTINUED | OUTPATIENT
Start: 2018-10-21 | End: 2018-10-21

## 2018-10-21 RX ORDER — ACETAMINOPHEN 500 MG
2 TABLET ORAL
Qty: 0 | Refills: 0 | COMMUNITY
Start: 2018-10-21

## 2018-10-21 RX ORDER — CEFTRIAXONE 500 MG/1
1 INJECTION, POWDER, FOR SOLUTION INTRAMUSCULAR; INTRAVENOUS ONCE
Qty: 0 | Refills: 0 | Status: COMPLETED | OUTPATIENT
Start: 2018-10-21 | End: 2018-10-21

## 2018-10-21 RX ORDER — MYCOPHENOLATE MOFETIL 250 MG/1
2 CAPSULE ORAL
Qty: 0 | Refills: 0 | COMMUNITY

## 2018-10-21 RX ORDER — CEFTRIAXONE 500 MG/1
1 INJECTION, POWDER, FOR SOLUTION INTRAMUSCULAR; INTRAVENOUS EVERY 24 HOURS
Qty: 0 | Refills: 0 | Status: DISCONTINUED | OUTPATIENT
Start: 2018-10-22 | End: 2018-10-21

## 2018-10-21 RX ORDER — CEFTRIAXONE 500 MG/1
INJECTION, POWDER, FOR SOLUTION INTRAMUSCULAR; INTRAVENOUS
Qty: 0 | Refills: 0 | Status: DISCONTINUED | OUTPATIENT
Start: 2018-10-21 | End: 2018-10-21

## 2018-10-21 RX ADMIN — Medication 5 MILLIGRAM(S): at 12:02

## 2018-10-21 RX ADMIN — Medication 100 MILLIGRAM(S): at 11:09

## 2018-10-21 RX ADMIN — Medication 88 MICROGRAM(S): at 06:02

## 2018-10-21 RX ADMIN — CEFTRIAXONE 100 GRAM(S): 500 INJECTION, POWDER, FOR SOLUTION INTRAMUSCULAR; INTRAVENOUS at 09:43

## 2018-10-21 RX ADMIN — INFLUENZA VIRUS VACCINE 0.5 MILLILITER(S): 15; 15; 15; 15 SUSPENSION INTRAMUSCULAR at 12:02

## 2018-10-21 NOTE — DISCHARGE NOTE ADULT - CARE PROVIDER_API CALL
PCP,   Phone: (   )    -  Fax: (   )    -    Dr. Wiseman, Rheumatology  Phone: (   )    -  Fax: (   )    -

## 2018-10-21 NOTE — DISCHARGE NOTE ADULT - PATIENT PORTAL LINK FT
You can access the [facility] Patient Portal, offered by [facility], by registering with the following website: [email or web address]

## 2018-10-21 NOTE — DISCHARGE NOTE ADULT - HOSPITAL COURSE
41 y/o F c/o mid-abd pain and dysuria since yesterday.  Abd pain radiates to b/l back.  Dysuria described as pressure sensation.  Also notes cough, nasal congestion and subjective fevers x 3 days.  Notes vaginal discharge x 2 days.  Denies n/v/d.  Never had abd pain before.  To UC today, and referred to ED as concern for pyelonephritis per UC note.  No hematuria.  Noted PMH and meds.  LMP 10/8.    hospital course:       Pyelonephritis  -Ucx  -pt states monongamous with  agreeable to HIV will send urine G/C r/o STD. CT A/P- suggesting RT bartholin's cysts given immunosuppressant with drainage and fever will have GYN evaluate if need I & D.  -cont ceftriaxone.     Hypothyroidism  -cont synthyroid.     Scleroderma  -hold immunosuppressant rheum consult-Dr. Ross outpt rheum cont immunosuppressants?  -Rheum- s/w Dr. Ross- hold cellucept; okay to continue plaquenil (no offical consult for now)    RA  -d/w Dr. Ross (Rheum)- c/w plaquenil 41 y/o F c/o mid-abd pain and dysuria since yesterday.  Abd pain radiates to b/l back.  Dysuria described as pressure sensation.  Also notes cough, nasal congestion and subjective fevers x 3 days.  Notes vaginal discharge x 2 days.  Denies n/v/d.  Never had abd pain before.  To UC today, and referred to ED as concern for pyelonephritis per UC note.  No hematuria.  Noted PMH and meds.  LMP 10/8.    Patient clinically improved on ctx, received 3 days.  No cx sent in ED thus sent on cefpodoxime for 10 more days.  WBC improved, no fevers, tolerating PO.  Patient was advised to hold cellcept until completion of abx and d/w rheum re: resumption.  Patient reports she is scheduled for an OP muscle bx and will f/u with rheum.  Seen by gyn.  Treated for candida.  Stable for dc home with OP f/u.

## 2018-10-21 NOTE — DISCHARGE NOTE ADULT - MEDICATION SUMMARY - MEDICATIONS TO STOP TAKING
I will STOP taking the medications listed below when I get home from the hospital:    NIFEdipine 30 mg oral tablet, extended release  -- 1 tab(s) by mouth once a day    isoniazid 300 mg oral tablet  -- 1 tab(s) by mouth once a day    CellCept    CellCept 500 mg oral tablet  -- 2 tab(s) by mouth 2 times a day

## 2018-10-21 NOTE — PROGRESS NOTE ADULT - ATTENDING COMMENTS
Patient stable for dispo home  d/w patient reasons to return, worsening fever, inability to tolerate PO abx, worsening flank pain etc  Patient in agreement with dispo,  will pick her up  pending abx sent to pharmacy for cost check  dispo time 31 min

## 2018-10-21 NOTE — PROGRESS NOTE ADULT - ASSESSMENT
Pt is 41 yo F with RA/scleroderma on immunosuppressant p/w pyelonephritis. UA+ +nitrite/LE WBC>50. +whitish vaginal discharge. CT A/P-+pyelonephritis  A 1.7 x 1.4 cm hypodense lesion in the right posterior aspect of the vagina below the pubic symphysis suggestive of a Bartholin's gland cyst. +WBC >15 HR >100 T>100.4 meets sepsis likely due to UTI, now resolved.

## 2018-10-21 NOTE — DISCHARGE NOTE ADULT - CARE PLAN
Principal Discharge DX:	Pyelonephritis  Goal:	pain and symptom treatment.  Assessment and plan of treatment:	Continue with antibiotics as prescribed, Please follow up with your PCP within 1-2 weeks for further outpatient management.  Follow up with your rheumatologist in 1 week in regards to resuming cellcept.  Secondary Diagnosis:	Hypothyroidism  Assessment and plan of treatment:	Continue synthroid.  Secondary Diagnosis:	Scleroderma  Assessment and plan of treatment:	Continue with plaquenil, hold cellcept until you speak to your rheumatologist about restarting.  Secondary Diagnosis:	Bartholin cyst  Assessment and plan of treatment:	As per GYN in the hospital no acute intervention at this time necessary. Follow up with your GYN outpatient.  Secondary Diagnosis:	Vaginal candidiasis  Assessment and plan of treatment:	Fluconazole given in the hospital. Principal Discharge DX:	Pyelonephritis  Goal:	pain and symptom treatment.  Assessment and plan of treatment:	Continue with antibiotics as prescribed, Please follow up with your PCP within 1-2 weeks for further outpatient management.  Follow up with your rheumatologist in 1 week in regards to resuming cellcept.  Secondary Diagnosis:	Hypothyroidism  Assessment and plan of treatment:	Continue synthroid.  Secondary Diagnosis:	Scleroderma  Assessment and plan of treatment:	Continue with plaquenil, hold cellcept until you speak to your rheumatologist about restarting. Continue prednisone as prescribed.  Secondary Diagnosis:	Bartholin cyst  Assessment and plan of treatment:	As per GYN in the hospital no acute intervention at this time necessary. Follow up with your GYN outpatient.  Secondary Diagnosis:	Vaginal candidiasis  Assessment and plan of treatment:	Fluconazole given in the hospital.  Secondary Diagnosis:	Hypertension  Assessment and plan of treatment:	Your blood pressure medication was held in the hospital. Monitor blood pressure regularly.  Monitor for any visual changes, headaches or dizziness.    Follow up with your PCP in 1 week to assess if blood pressure medication should be restarted.

## 2018-10-21 NOTE — PROGRESS NOTE ADULT - SUBJECTIVE AND OBJECTIVE BOX
Patient is a 42y old  Female who presents with a chief complaint of pyelo    SUBJECTIVE / OVERNIGHT EVENTS:    Slept OK  no CP, SOB, f/c  Tolerating diet, voiding  Burning has resolved    MEDICATIONS  (STANDING):  cefTRIAXone   IVPB      cefTRIAXone   IVPB 1 Gram(s) IV Intermittent once  hydroxychloroquine 100 milliGRAM(s) Oral daily  influenza   Vaccine 0.5 milliLiter(s) IntraMuscular once  levothyroxine 88 MICROGram(s) Oral daily    MEDICATIONS  (PRN):  acetaminophen   Tablet .. 650 milliGRAM(s) Oral every 6 hours PRN Temp greater or equal to 38C (100.4F)  ALBUTerol    90 MICROgram(s) HFA Inhaler 1 Puff(s) Inhalation every 6 hours PRN Shortness of Breath and/or Wheezing  guaiFENesin    Syrup 200 milliGRAM(s) Oral every 6 hours PRN Cough  ondansetron Injectable 4 milliGRAM(s) IV Push every 6 hours PRN Nausea    T(C): 36.9 (10-21-18 @ 05:48), Max: 37.2 (10-20-18 @ 22:22)  HR: 88 (10-21-18 @ 05:48) (88 - 100)  BP: 102/61 (10-21-18 @ 05:48) (102/61 - 109/67)  RR: 17 (10-21-18 @ 05:48) (17 - 18)  SpO2: 99% (10-21-18 @ 05:48) (99% - 100%)    I&O's Summary    20 Oct 2018 07:01  -  21 Oct 2018 07:00  --------------------------------------------------------  IN: 900 mL / OUT: 0 mL / NET: 900 mL    PHYSICAL EXAM:  GENERAL: NAD, well-developed, tight facial skin   NECK: Supple, No JVD  CHEST/LUNG: Clear to auscultation bilaterally; No wheeze  HEART: Regular rate and rhythm; No murmurs, rubs, or gallops  ABDOMEN: Soft, Nontender, Nondistended; Bowel sounds present  EXTREMITIES:   warm and well perfused, No clubbing, cyanosis, or edema  BACK: mild left CVA tenderness  PSYCH: AAOx3  NEUROLOGY: non-focal      LABS:                        11.3   9.31  )-----------( 156      ( 21 Oct 2018 05:55 )             35.0     10-20    138  |  105  |  6<L>  ----------------------------<  83  3.8   |  19<L>  |  0.45<L>    Ca    8.5      20 Oct 2018 06:00    TPro  6.4  /  Alb  3.3  /  TBili  0.7  /  DBili  x   /  AST  21  /  ALT  15  /  AlkPhos  73  10-20        Consultant(s) Notes Reviewed:    Care Discussed with Consultants/Other Providers:

## 2018-10-21 NOTE — DISCHARGE NOTE ADULT - PROVIDER TOKENS
FREE:[LAST:[PCP],PHONE:[(   )    -],FAX:[(   )    -]],FREE:[LAST:[Dr. Wiseman],FIRST:[Rheumatology],PHONE:[(   )    -],FAX:[(   )    -]]

## 2018-10-21 NOTE — DISCHARGE NOTE ADULT - PLAN OF CARE
pain and symptom treatment. Continue with antibiotics as prescribed, Please follow up with your PCP within 1-2 weeks for further outpatient management.  Follow up with your rheumatologist in 1 week in regards to resuming cellcept. Continue synthroid. Continue with plaquenil, hold cellcept until you speak to your rheumatologist about restarting. As per GYN in the hospital no acute intervention at this time necessary. Follow up with your GYN outpatient. Fluconazole given in the hospital. Continue with plaquenil, hold cellcept until you speak to your rheumatologist about restarting. Continue prednisone as prescribed. Your blood pressure medication was held in the hospital. Monitor blood pressure regularly.  Monitor for any visual changes, headaches or dizziness.    Follow up with your PCP in 1 week to assess if blood pressure medication should be restarted.

## 2018-10-21 NOTE — PROGRESS NOTE ADULT - PROBLEM SELECTOR PLAN 1
- urine cx resent after 24 hours of abx, unlikely to be helpful  - clinically improved on ctx, no further fevers, resolution of WBC elevation  - ctx D3,  will dc with 10 more days of cefpodoxime  - ibuprofen prn pain   - hold Cellcept on dc until OP f/u with rheum, c/w Plaquenil   -cont ceftriaxone, D2

## 2018-10-22 LAB
BACTERIA UR CULT: SIGNIFICANT CHANGE UP
SPECIMEN SOURCE: SIGNIFICANT CHANGE UP

## 2018-10-22 NOTE — DISCUSSION/SUMMARY
[Hospital: _____] : Hospital:  [FreeTextEntry1] : Patient was discharged yesterday, 10/21/18, from [facility] where she was hospitalized for pyelonephritis. I called the patient who reports that she is doing well since being discharged and feels much better. I explained to the patient that I would send the  a task to call her and schedule a follow up appointment with Dr. Hermosillo. She verbalized understanding of this.  tasked.\par \par DC Firm 3

## 2018-10-31 ENCOUNTER — APPOINTMENT (OUTPATIENT)
Dept: INTERNAL MEDICINE | Facility: HOSPITAL | Age: 43
End: 2018-10-31
Payer: COMMERCIAL

## 2018-10-31 ENCOUNTER — OUTPATIENT (OUTPATIENT)
Dept: OUTPATIENT SERVICES | Facility: HOSPITAL | Age: 43
LOS: 1 days | End: 2018-10-31

## 2018-10-31 ENCOUNTER — LABORATORY RESULT (OUTPATIENT)
Age: 43
End: 2018-10-31

## 2018-10-31 VITALS — HEART RATE: 72 BPM | OXYGEN SATURATION: 98 %

## 2018-10-31 VITALS — SYSTOLIC BLOOD PRESSURE: 104 MMHG | DIASTOLIC BLOOD PRESSURE: 74 MMHG

## 2018-10-31 VITALS
SYSTOLIC BLOOD PRESSURE: 132 MMHG | WEIGHT: 117.38 LBS | DIASTOLIC BLOOD PRESSURE: 83 MMHG | HEIGHT: 61 IN | BODY MASS INDEX: 22.16 KG/M2 | HEART RATE: 78 BPM

## 2018-10-31 DIAGNOSIS — Z01.818 ENCOUNTER FOR OTHER PREPROCEDURAL EXAMINATION: ICD-10-CM

## 2018-10-31 DIAGNOSIS — Z98.89 OTHER SPECIFIED POSTPROCEDURAL STATES: Chronic | ICD-10-CM

## 2018-10-31 LAB
BASOPHILS # BLD AUTO: 0.07 K/UL — SIGNIFICANT CHANGE UP (ref 0–0.2)
BASOPHILS NFR BLD AUTO: 0.8 % — SIGNIFICANT CHANGE UP (ref 0–2)
BUN SERPL-MCNC: 10 MG/DL — SIGNIFICANT CHANGE UP (ref 7–23)
CALCIUM SERPL-MCNC: 9.3 MG/DL — SIGNIFICANT CHANGE UP (ref 8.4–10.5)
CHLORIDE SERPL-SCNC: 100 MMOL/L — SIGNIFICANT CHANGE UP (ref 98–107)
CO2 SERPL-SCNC: 29 MMOL/L — SIGNIFICANT CHANGE UP (ref 22–31)
CREAT SERPL-MCNC: 0.54 MG/DL — SIGNIFICANT CHANGE UP (ref 0.5–1.3)
EOSINOPHIL # BLD AUTO: 0.17 K/UL — SIGNIFICANT CHANGE UP (ref 0–0.5)
EOSINOPHIL NFR BLD AUTO: 2.1 % — SIGNIFICANT CHANGE UP (ref 0–6)
GLUCOSE SERPL-MCNC: 79 MG/DL — SIGNIFICANT CHANGE UP (ref 70–99)
HCT VFR BLD CALC: 42.8 % — SIGNIFICANT CHANGE UP (ref 34.5–45)
HGB BLD-MCNC: 13.6 G/DL — SIGNIFICANT CHANGE UP (ref 11.5–15.5)
IMM GRANULOCYTES # BLD AUTO: 0.05 # — SIGNIFICANT CHANGE UP
IMM GRANULOCYTES NFR BLD AUTO: 0.6 % — SIGNIFICANT CHANGE UP (ref 0–1.5)
LYMPHOCYTES # BLD AUTO: 2.82 K/UL — SIGNIFICANT CHANGE UP (ref 1–3.3)
LYMPHOCYTES # BLD AUTO: 34 % — SIGNIFICANT CHANGE UP (ref 13–44)
MCHC RBC-ENTMCNC: 26.2 PG — LOW (ref 27–34)
MCHC RBC-ENTMCNC: 31.8 % — LOW (ref 32–36)
MCV RBC AUTO: 82.5 FL — SIGNIFICANT CHANGE UP (ref 80–100)
MONOCYTES # BLD AUTO: 0.83 K/UL — SIGNIFICANT CHANGE UP (ref 0–0.9)
MONOCYTES NFR BLD AUTO: 10 % — SIGNIFICANT CHANGE UP (ref 2–14)
NEUTROPHILS # BLD AUTO: 4.35 K/UL — SIGNIFICANT CHANGE UP (ref 1.8–7.4)
NEUTROPHILS NFR BLD AUTO: 52.5 % — SIGNIFICANT CHANGE UP (ref 43–77)
NRBC # FLD: 0 — SIGNIFICANT CHANGE UP
PLATELET # BLD AUTO: 279 K/UL — SIGNIFICANT CHANGE UP (ref 150–400)
PMV BLD: 11.3 FL — SIGNIFICANT CHANGE UP (ref 7–13)
POTASSIUM SERPL-MCNC: 3.9 MMOL/L — SIGNIFICANT CHANGE UP (ref 3.5–5.3)
POTASSIUM SERPL-SCNC: 3.9 MMOL/L — SIGNIFICANT CHANGE UP (ref 3.5–5.3)
RBC # BLD: 5.19 M/UL — SIGNIFICANT CHANGE UP (ref 3.8–5.2)
RBC # FLD: 13.7 % — SIGNIFICANT CHANGE UP (ref 10.3–14.5)
SODIUM SERPL-SCNC: 138 MMOL/L — SIGNIFICANT CHANGE UP (ref 135–145)
WBC # BLD: 8.29 K/UL — SIGNIFICANT CHANGE UP (ref 3.8–10.5)
WBC # FLD AUTO: 8.29 K/UL — SIGNIFICANT CHANGE UP (ref 3.8–10.5)

## 2018-10-31 PROCEDURE — 99214 OFFICE O/P EST MOD 30 MIN: CPT | Mod: GC

## 2018-10-31 RX ORDER — ALBUTEROL SULFATE 90 UG/1
108 (90 BASE) AEROSOL, METERED RESPIRATORY (INHALATION) 4 TIMES DAILY
Qty: 1 | Refills: 0 | Status: COMPLETED | COMMUNITY
Start: 2018-01-25 | End: 2018-08-10

## 2018-10-31 NOTE — ASSESSMENT
[Procedure Low Risk] : the procedure risk is low [Patient Low Risk] : the patient is a low surgical risk [Optimized for Surgery] : the patient is optimized for surgery [As per surgery] : as per surgery [Continue] : Continue medications as currently directed [FreeTextEntry3] : And as per rheumatology.  Her thyroid medication she may take on an empty stomach after the surgery. [FreeTextEntry1] : Assessment as indicated above in impressions with plans through orders below.\par \par In regards to her shingles vaccine prescription was sent to the [facility] who has been instructed to call patient when ready for pickup.  Patient can bring vaccine to the office for injection by nurse.

## 2018-10-31 NOTE — HISTORY OF PRESENT ILLNESS
[Post-hospitalization from ___ Hospital] : Post-hospitalization from [unfilled] Hospital [Admitted on: ___] : The patient was admitted on [unfilled] [Discharged on ___] : discharged on [unfilled] [Discharge Summary] : discharge summary [Pertinent Labs] : pertinent labs [Discharge Med List] : discharge medication list [Med Reconciliation] : medication reconciliation has been completed [FreeTextEntry2] : Copied and pasted from Discharge note with edits:\par - Hospital Course:   41 y/o F c/o mid-abd pain and dysuria since [day prior to admission].  Abd pain radiates to b/l back.  Dysuria described as pressure sensation.  Also notes cough, nasal congestion and subjective fevers x 3 days.  Notes vaginal discharge x 2 days. Denies n/v/d.  Never had abd pain before.  To UC today, and referred to ED as concern for pyelonephritis per UC note.  No hematuria.  Noted PMH and meds. LMP 10/8. \par Patient clinically improved on ctx, received 3 days.  No cx sent in ED thus sent on cefpodoxime for 10 more days.  WBC improved, no fevers, tolerating PO. Patient was advised to hold cellcept until completion of abx and d/w rheum re: \par resumption.  Patient reports she is scheduled for an OP muscle bx and will f/u with rheum.  Seen by gyn.  Treated for candida.  Stable for dc home with OP f/u.  [de-identified] : Ms. NICOLAS LAL comes in today for a follow-up visit from hospital discharge and is requesting as well preoperative clearance for her muscle biopsy.  Patient reports that she is doing well without any new complaints since her last visit.  She reports she is completed with her last dose of antibiotics this morning and is wondering when she should start taking her CellCept.\par \par In regards to her surgery it is scheduled for  with Dr. Andriy Rivero.  She is wondering if she is able to complete most of her testing today as opposed to going reports having prior surgeries include: Breast surgery at [facility] had lymph node removed and .  She denies any problems with anesthesia in the past.  She denies any problems with excessive bleeding, palpitations, chest pain, heart problems, fever or chills, and is able to go about her daily activities without too much problems.  She does report continued weakness for which she requests assistance from her children to achieve her household chores.  She has been going to physical therapy for her ring scapula but recently states the insurance will no longer pay for any further treatments.  She is asking if going to the chiropractor would work just as well.\par \par Regards to family history she denies any history of adverse anesthesia in the family or bleeding disorders.  Otherwise her family history is unchanged\par \par Discharge Medication Review: \par predniSONE 5 mg oral tablet -- 1 tab(s) by mouth every other day -- Indication: For Scleroderma; Patient states she is on 1 mg eery other day.\par acetaminophen 325 mg oral tablet -- 2 tab(s) by mouth every 6 hours, As needed, Temp greater or equal to 38C (100.4F) -- Indication: For fever \par Plaquenil 200 mg oral tablet -- 1 tab(s) by mouth once a day -- Indication: For Scleroderma \par cefpodoxime 100 mg oral tablet -- 1 tab(s) by mouth every 12 hours x 10 days -- Finish all this medication unless otherwise directed by prescriber. Take with food or milk. -- Indication: For uti \par levothyroxine 88 mcg (0.088 mg) oral tablet -- 1 tab(s) by mouth once a day -- Indication: For Hypothyroidism \par Vitamin D3 2000 intl units oral capsule -- 1 cap(s) by mouth once a day -- Indication: For Supplement. \par \par Told to stop: \par NIFEdipine 30 mg oral tablet, extended release -- 1 tab(s) by mouth once a day \par isoniazid 300 mg oral tablet -- 1 tab(s) by mouth once a day \par CellCept CellCept 500 mg oral tablet -- 2 tab(s) by mouth 2 times a day. \par \par I will SWITCH the dose or number of times a day I take the medications listed below when I get home from the hospital: \par levothyroxine 100 mcg (0.1 mg) oral tablet -- 1 tab(s) by mouth once a day.

## 2018-10-31 NOTE — REVIEW OF SYSTEMS
[Palpitations] : no palpitations [Muscle Weakness] : muscle weakness [Negative] : Heme/Lymph [FreeTextEntry6] : doesn't need the inhaler that was prescribed in the past and has never used it. [FreeTextEntry8] : resolved urinary issues [FreeTextEntry9] : as per HPI [de-identified] : Continues to have first few spots for which clotrimazole was prescribed by the new rheumatologist on her forearm

## 2018-10-31 NOTE — PHYSICAL EXAM
[No Acute Distress] : no acute distress [Well Developed] : well developed [Well-Appearing] : well-appearing [Normal Sclera/Conjunctiva] : normal sclera/conjunctiva [No Respiratory Distress] : no respiratory distress  [Clear to Auscultation] : lungs were clear to auscultation bilaterally [Normal Rate] : normal rate  [Regular Rhythm] : with a regular rhythm [Normal S1, S2] : normal S1 and S2 [No Murmur] : no murmur heard [No Abdominal Bruit] : a ~M bruit was not heard ~T in the abdomen [No Edema] : there was no peripheral edema [No Palpable Aorta] : no palpable aorta [Soft] : abdomen soft [Non Tender] : non-tender [Non-distended] : non-distended [No Masses] : no abdominal mass palpated [No HSM] : no HSM [Normal Bowel Sounds] : normal bowel sounds [Deep Tendon Reflexes (DTR)] : deep tendon reflexes were 2+ and symmetric [Normal Affect] : the affect was normal [Alert and Oriented x3] : oriented to person, place, and time [Normal Mood] : the mood was normal [2+] : left 2+ [de-identified] : Noted a few erythematous papular lesions on her left wrist

## 2018-11-02 DIAGNOSIS — Z23 ENCOUNTER FOR IMMUNIZATION: ICD-10-CM

## 2018-11-02 DIAGNOSIS — J84.9 INTERSTITIAL PULMONARY DISEASE, UNSPECIFIED: ICD-10-CM

## 2018-11-02 DIAGNOSIS — M60.9 MYOSITIS, UNSPECIFIED: ICD-10-CM

## 2018-11-02 DIAGNOSIS — Z09 ENCOUNTER FOR FOLLOW-UP EXAMINATION AFTER COMPLETED TREATMENT FOR CONDITIONS OTHER THAN MALIGNANT NEOPLASM: ICD-10-CM

## 2018-11-02 DIAGNOSIS — M34.9 SYSTEMIC SCLEROSIS, UNSPECIFIED: ICD-10-CM

## 2018-11-02 DIAGNOSIS — Z01.818 ENCOUNTER FOR OTHER PREPROCEDURAL EXAMINATION: ICD-10-CM

## 2018-11-02 DIAGNOSIS — Z79.899 OTHER LONG TERM (CURRENT) DRUG THERAPY: ICD-10-CM

## 2018-11-06 ENCOUNTER — RX RENEWAL (OUTPATIENT)
Age: 43
End: 2018-11-06

## 2018-11-14 ENCOUNTER — OUTPATIENT (OUTPATIENT)
Dept: OUTPATIENT SERVICES | Facility: HOSPITAL | Age: 43
LOS: 1 days | End: 2018-11-14
Payer: COMMERCIAL

## 2018-11-14 VITALS
RESPIRATION RATE: 14 BRPM | DIASTOLIC BLOOD PRESSURE: 70 MMHG | HEART RATE: 70 BPM | SYSTOLIC BLOOD PRESSURE: 110 MMHG | OXYGEN SATURATION: 99 % | WEIGHT: 116.84 LBS | HEIGHT: 60 IN | TEMPERATURE: 98 F

## 2018-11-14 DIAGNOSIS — M34.9 SYSTEMIC SCLEROSIS, UNSPECIFIED: ICD-10-CM

## 2018-11-14 DIAGNOSIS — Z98.89 OTHER SPECIFIED POSTPROCEDURAL STATES: Chronic | ICD-10-CM

## 2018-11-14 DIAGNOSIS — Z98.890 OTHER SPECIFIED POSTPROCEDURAL STATES: Chronic | ICD-10-CM

## 2018-11-14 DIAGNOSIS — Z01.818 ENCOUNTER FOR OTHER PREPROCEDURAL EXAMINATION: ICD-10-CM

## 2018-11-14 DIAGNOSIS — M60.9 MYOSITIS, UNSPECIFIED: ICD-10-CM

## 2018-11-14 PROCEDURE — G0463: CPT

## 2018-11-14 NOTE — H&P PST ADULT - PROBLEM SELECTOR PLAN 1
Right deltoid muscle biopsy   Hematology clearance . Instructions on prednisone and Cellcept   Anesthesia consult ;patient with small oral cavity , skin taut to cheeks due to scleroderma . spoke with Andriy kim

## 2018-11-14 NOTE — H&P PST ADULT - AIRWAY
normal/small oval cavity - skin taunt to cheeks due to scleroderma small oral cavity - skin stretching  to cheeks due to scleroderma/normal

## 2018-11-14 NOTE — H&P PST ADULT - HISTORY OF PRESENT ILLNESS
42 yo female with Hypothyroidism, Scleroderma, and Rheumatoid arthritis reports h/o mammogram and CT sc 44 yo female with Hypothyroidism, Scleroderma, and Rheumatoid arthritis presents with complaint of 3 year history of progressively worsening bilateral arm pain, weakness, muscle weakness worse in right arm ; Reports loss of mobility in right arm and radiating pain to right scapula clavicle and shoulder . scheduled for right deltoid muscle biopsy on 11/29/18

## 2018-11-14 NOTE — H&P PST ADULT - FAMILY HISTORY
Mother  Still living? No  Family history of heart attack, Age at diagnosis: Age Unknown     Father  Still living? Yes, Estimated age: 71-80  Family history of diabetes mellitus in father, Age at diagnosis: Age Unknown

## 2018-11-14 NOTE — H&P PST ADULT - RS GEN PE MLT RESP DETAILS PC
breath sounds equal/respirations non-labored/no rales/good air movement/no rhonchi/clear to auscultation bilaterally

## 2018-11-14 NOTE — H&P PST ADULT - NSANTHOSAYNRD_GEN_A_CORE
No. MUKUND screening performed.  STOP BANG Legend: 0-2 = LOW Risk; 3-4 = INTERMEDIATE Risk; 5-8 = HIGH Risk

## 2018-11-14 NOTE — H&P PST ADULT - ASSESSMENT
Localized enlarged lymph nodes to right axilla.. 44 y/o female with hx scleroderma, RA presents with bilateral arm pain and weakness

## 2018-11-19 ENCOUNTER — RX RENEWAL (OUTPATIENT)
Age: 43
End: 2018-11-19

## 2018-11-19 ENCOUNTER — OUTPATIENT (OUTPATIENT)
Dept: OUTPATIENT SERVICES | Facility: HOSPITAL | Age: 43
LOS: 1 days | End: 2018-11-19

## 2018-11-19 ENCOUNTER — LABORATORY RESULT (OUTPATIENT)
Age: 43
End: 2018-11-19

## 2018-11-19 ENCOUNTER — APPOINTMENT (OUTPATIENT)
Dept: OBGYN | Facility: HOSPITAL | Age: 43
End: 2018-11-19
Payer: COMMERCIAL

## 2018-11-19 VITALS
DIASTOLIC BLOOD PRESSURE: 70 MMHG | BODY MASS INDEX: 22.33 KG/M2 | SYSTOLIC BLOOD PRESSURE: 100 MMHG | HEART RATE: 95 BPM | HEIGHT: 61 IN | WEIGHT: 118.28 LBS

## 2018-11-19 DIAGNOSIS — Z98.890 OTHER SPECIFIED POSTPROCEDURAL STATES: Chronic | ICD-10-CM

## 2018-11-19 DIAGNOSIS — Z98.89 OTHER SPECIFIED POSTPROCEDURAL STATES: Chronic | ICD-10-CM

## 2018-11-19 LAB
APPEARANCE UR: CLEAR — SIGNIFICANT CHANGE UP
BILIRUB UR-MCNC: NEGATIVE — SIGNIFICANT CHANGE UP
BLOOD UR QL VISUAL: SIGNIFICANT CHANGE UP
COLOR SPEC: YELLOW — SIGNIFICANT CHANGE UP
GLUCOSE UR-MCNC: NEGATIVE — SIGNIFICANT CHANGE UP
KETONES UR-MCNC: NEGATIVE — SIGNIFICANT CHANGE UP
LEUKOCYTE ESTERASE UR-ACNC: NEGATIVE — SIGNIFICANT CHANGE UP
NITRITE UR-MCNC: NEGATIVE — SIGNIFICANT CHANGE UP
PH UR: 6.5 — SIGNIFICANT CHANGE UP (ref 5–8)
PROT UR-MCNC: NEGATIVE — SIGNIFICANT CHANGE UP
SP GR SPEC: 1.02 — SIGNIFICANT CHANGE UP (ref 1–1.04)
UROBILINOGEN FLD QL: NORMAL — SIGNIFICANT CHANGE UP

## 2018-11-19 PROCEDURE — 99213 OFFICE O/P EST LOW 20 MIN: CPT | Mod: GC

## 2018-11-19 NOTE — ASU DISCHARGE PLAN (ADULT/PEDIATRIC). - NOTIFY
Bleeding that does not stop/Swelling that continues/Numbness, color, or temperature change to extremity/Pain not relieved by Medications/Fever greater than 101/Numbness, tingling/Inability to Tolerate Liquids or Foods/Persistent Nausea and Vomiting/Unable to Urinate/Increased Irritability or Sluggishness

## 2018-11-19 NOTE — ASU DISCHARGE PLAN (ADULT/PEDIATRIC). - MEDICATION SUMMARY - MEDICATIONS TO TAKE
I will START or STAY ON the medications listed below when I get home from the hospital:    predniSONE 1 mg oral tablet  -- orally every other day  -- Indication: For continuation of home medication - hormone modifier    oxyCODONE-acetaminophen 5 mg-325 mg oral tablet  -- 1 tab(s) by mouth every 6 hours, As Needed -for severe pain MDD:4   -- Caution federal law prohibits the transfer of this drug to any person other  than the person for whom it was prescribed.  May cause drowsiness.  Alcohol may intensify this effect.  Use care when operating dangerous machinery.  This prescription cannot be refilled.  This product contains acetaminophen.  Do not use  with any other product containing acetaminophen to prevent possible liver damage.  Using more of this medication than prescribed may cause serious breathing problems.    -- Indication: For the management of severe pain you may have after today's procedure    Plaquenil 200 mg oral tablet  -- 1 tab(s) by mouth once a day  -- Indication: For continuation of home medication - antimalarial (anti-infective)    mycophenolate mofetil 500 mg oral tablet  -- 2 tab(s) by mouth 2 times a day  -- Indication: For continuation of home medication - immunosuppressive    levothyroxine 88 mcg (0.088 mg) oral tablet  -- 1 tab(s) by mouth once a day  -- Indication: For continuation of home medication - thyroid    Vitamin D3 2000 intl units oral capsule  -- 1 cap(s) by mouth once a day  -- Indication: For continuation of home medication - vitamin

## 2018-11-19 NOTE — ASU DISCHARGE PLAN (ADULT/PEDIATRIC). - BATHING
shower only/in 48 hours. May shower in 48 hours. May/shower only May shower in 48 hours. Cover with dressing w/ a  waterproof sleeve/shower only

## 2018-11-19 NOTE — ASU DISCHARGE PLAN (ADULT/PEDIATRIC). - DRESSING FT
For the next 24-48 hours while awake, alternate ice pack 15 minutes on & 15 minutes off For the next 24-48 hours while awake, alternate ice pack 15 minutes on & 15 minutes off.

## 2018-11-19 NOTE — ASU DISCHARGE PLAN (ADULT/PEDIATRIC). - FOLLOWUP APPOINTMENT CLINIC/PHYSICIAN
Please call Dr. NINI Rivero's office ([phone]) to schedule a follow-up appointment to be seen in 7-10 days.

## 2018-11-19 NOTE — ASU DISCHARGE PLAN (ADULT/PEDIATRIC). - PAIN
prescription called to pharmacy/e-Prescribed. prescription called to pharmacy/e-Prescribed. [facility]# [id number]

## 2018-11-20 ENCOUNTER — RX RENEWAL (OUTPATIENT)
Age: 43
End: 2018-11-20

## 2018-11-20 DIAGNOSIS — Z00.00 ENCOUNTER FOR GENERAL ADULT MEDICAL EXAMINATION WITHOUT ABNORMAL FINDINGS: ICD-10-CM

## 2018-11-21 ENCOUNTER — MESSAGE (OUTPATIENT)
Age: 43
End: 2018-11-21

## 2018-11-21 LAB
BACTERIA UR CULT: SIGNIFICANT CHANGE UP
SPECIMEN SOURCE: SIGNIFICANT CHANGE UP

## 2018-11-28 ENCOUNTER — TRANSCRIPTION ENCOUNTER (OUTPATIENT)
Age: 43
End: 2018-11-28

## 2018-11-29 ENCOUNTER — RESULT REVIEW (OUTPATIENT)
Age: 43
End: 2018-11-29

## 2018-11-29 ENCOUNTER — OUTPATIENT (OUTPATIENT)
Dept: OUTPATIENT SERVICES | Facility: HOSPITAL | Age: 43
LOS: 1 days | End: 2018-11-29
Payer: COMMERCIAL

## 2018-11-29 VITALS
HEART RATE: 81 BPM | DIASTOLIC BLOOD PRESSURE: 73 MMHG | TEMPERATURE: 97 F | SYSTOLIC BLOOD PRESSURE: 117 MMHG | RESPIRATION RATE: 12 BRPM | OXYGEN SATURATION: 100 %

## 2018-11-29 VITALS
RESPIRATION RATE: 17 BRPM | HEART RATE: 80 BPM | WEIGHT: 116.4 LBS | HEIGHT: 60 IN | DIASTOLIC BLOOD PRESSURE: 75 MMHG | SYSTOLIC BLOOD PRESSURE: 120 MMHG | OXYGEN SATURATION: 100 % | TEMPERATURE: 98 F

## 2018-11-29 DIAGNOSIS — M60.9 MYOSITIS, UNSPECIFIED: ICD-10-CM

## 2018-11-29 DIAGNOSIS — Z01.818 ENCOUNTER FOR OTHER PREPROCEDURAL EXAMINATION: ICD-10-CM

## 2018-11-29 DIAGNOSIS — Z98.890 OTHER SPECIFIED POSTPROCEDURAL STATES: Chronic | ICD-10-CM

## 2018-11-29 DIAGNOSIS — Z98.89 OTHER SPECIFIED POSTPROCEDURAL STATES: Chronic | ICD-10-CM

## 2018-11-29 LAB — HCG UR QL: NEGATIVE — SIGNIFICANT CHANGE UP

## 2018-11-29 PROCEDURE — 20205 DEEP MUSCLE BIOPSY: CPT | Mod: RT

## 2018-11-29 PROCEDURE — 81025 URINE PREGNANCY TEST: CPT

## 2018-11-29 PROCEDURE — 88300 SURGICAL PATH GROSS: CPT

## 2018-11-29 PROCEDURE — ZZZZZ: CPT

## 2018-11-29 PROCEDURE — 88300 SURGICAL PATH GROSS: CPT | Mod: 26

## 2018-11-29 RX ORDER — CHLORHEXIDINE GLUCONATE 213 G/1000ML
1 SOLUTION TOPICAL ONCE
Qty: 0 | Refills: 0 | Status: COMPLETED | OUTPATIENT
Start: 2018-11-29 | End: 2018-11-29

## 2018-11-29 RX ORDER — OXYCODONE AND ACETAMINOPHEN 5; 325 MG/1; MG/1
1 TABLET ORAL ONCE
Qty: 0 | Refills: 0 | Status: DISCONTINUED | OUTPATIENT
Start: 2018-11-29 | End: 2018-11-29

## 2018-11-29 RX ORDER — ACETAMINOPHEN 500 MG
650 TABLET ORAL ONCE
Qty: 0 | Refills: 0 | Status: DISCONTINUED | OUTPATIENT
Start: 2018-11-29 | End: 2018-11-29

## 2018-11-29 RX ORDER — LEVOTHYROXINE SODIUM 125 MCG
1 TABLET ORAL
Qty: 0 | Refills: 0 | COMMUNITY

## 2018-11-29 RX ORDER — MYCOPHENOLATE MOFETIL 250 MG/1
2 CAPSULE ORAL
Qty: 0 | Refills: 0 | COMMUNITY

## 2018-11-29 RX ORDER — CHOLECALCIFEROL (VITAMIN D3) 125 MCG
1 CAPSULE ORAL
Qty: 0 | Refills: 0 | COMMUNITY

## 2018-11-29 RX ORDER — HYDROXYCHLOROQUINE SULFATE 200 MG
1 TABLET ORAL
Qty: 0 | Refills: 0 | COMMUNITY

## 2018-11-29 RX ORDER — SODIUM CHLORIDE 9 MG/ML
1000 INJECTION, SOLUTION INTRAVENOUS
Qty: 0 | Refills: 0 | Status: DISCONTINUED | OUTPATIENT
Start: 2018-11-29 | End: 2018-11-30

## 2018-11-29 RX ORDER — CEFAZOLIN SODIUM 1 G
2000 VIAL (EA) INJECTION ONCE
Qty: 0 | Refills: 0 | Status: COMPLETED | OUTPATIENT
Start: 2018-11-29 | End: 2018-11-29

## 2018-11-29 RX ADMIN — OXYCODONE AND ACETAMINOPHEN 1 TABLET(S): 5; 325 TABLET ORAL at 12:35

## 2018-11-29 RX ADMIN — SODIUM CHLORIDE 75 MILLILITER(S): 9 INJECTION, SOLUTION INTRAVENOUS at 11:24

## 2018-11-29 RX ADMIN — CHLORHEXIDINE GLUCONATE 1 APPLICATION(S): 213 SOLUTION TOPICAL at 08:23

## 2018-11-29 RX ADMIN — OXYCODONE AND ACETAMINOPHEN 1 TABLET(S): 5; 325 TABLET ORAL at 12:05

## 2018-11-29 NOTE — BRIEF OPERATIVE NOTE - PROCEDURE
<<-----Click on this checkbox to enter Procedure Biopsy of right deltoid muscle  11/29/2018    Active  ZACKERY

## 2018-12-09 ENCOUNTER — RX RENEWAL (OUTPATIENT)
Age: 43
End: 2018-12-09

## 2018-12-10 ENCOUNTER — RX RENEWAL (OUTPATIENT)
Age: 43
End: 2018-12-10

## 2018-12-21 ENCOUNTER — APPOINTMENT (OUTPATIENT)
Dept: RHEUMATOLOGY | Facility: CLINIC | Age: 43
End: 2018-12-21
Payer: COMMERCIAL

## 2018-12-21 VITALS
BODY MASS INDEX: 21.9 KG/M2 | HEIGHT: 61 IN | HEART RATE: 74 BPM | DIASTOLIC BLOOD PRESSURE: 88 MMHG | SYSTOLIC BLOOD PRESSURE: 127 MMHG | WEIGHT: 116 LBS | TEMPERATURE: 97.8 F | OXYGEN SATURATION: 98 %

## 2018-12-21 PROCEDURE — 99215 OFFICE O/P EST HI 40 MIN: CPT | Mod: GC

## 2019-01-10 ENCOUNTER — APPOINTMENT (OUTPATIENT)
Dept: PULMONOLOGY | Facility: CLINIC | Age: 44
End: 2019-01-10
Payer: COMMERCIAL

## 2019-01-10 VITALS
TEMPERATURE: 98.3 F | BODY MASS INDEX: 22.28 KG/M2 | DIASTOLIC BLOOD PRESSURE: 77 MMHG | HEIGHT: 61 IN | SYSTOLIC BLOOD PRESSURE: 118 MMHG | OXYGEN SATURATION: 99 % | WEIGHT: 118 LBS | HEART RATE: 77 BPM | RESPIRATION RATE: 18 BRPM

## 2019-01-10 PROCEDURE — 94729 DIFFUSING CAPACITY: CPT

## 2019-01-10 PROCEDURE — ZZZZZ: CPT

## 2019-01-10 PROCEDURE — 94726 PLETHYSMOGRAPHY LUNG VOLUMES: CPT

## 2019-01-10 PROCEDURE — 99215 OFFICE O/P EST HI 40 MIN: CPT | Mod: 25

## 2019-01-10 PROCEDURE — 94010 BREATHING CAPACITY TEST: CPT

## 2019-01-10 NOTE — HISTORY OF PRESENT ILLNESS
[FreeTextEntry1] : 43-year-old female with a past history of sclerodactyly, interstitial lung disease, Raynaud's phenomena, nonischemic myocarditis and more recently a myositis currently receiving CellCept, plaque were no, and prednisone. We had noted that without the prednisone, her lung function had deteriorated and was improved on 5 mg per day. Recently because of side effects from her plaque with no and the onset of myositis, prednisone which had been reduced and again has now been increased back to 5 mg. Currently she has no pulmonary complaints. She does have some muscle weakness. She denies any recent fevers, coughing or chest discomfort. She denies any significant dysphagia.

## 2019-01-10 NOTE — ASSESSMENT
[FreeTextEntry1] : The patient's lung functions are quite similar to what they were 6 months ago. There has been a slight drop in her diffusing capacity which is within the range of error of our laboratory but will bear watching. Continue current therapy. I will followup with her in 6 months, sooner if indicated.

## 2019-01-10 NOTE — PHYSICAL EXAM
[General Appearance - In No Acute Distress] : no acute distress [Normal Conjunctiva] : the conjunctiva exhibited no abnormalities [Neck Appearance] : the appearance of the neck was normal [Heart Rate And Rhythm] : heart rate and rhythm were normal [Heart Sounds] : normal S1 and S2 [] : no respiratory distress [Respiration, Rhythm And Depth] : normal respiratory rhythm and effort [Abnormal Walk] : normal gait [Nail Clubbing] : no clubbing of the fingernails [No Focal Deficits] : no focal deficits [Affect] : the affect was normal [Mood] : the mood was normal [FreeTextEntry1] : sclerodactyly

## 2019-01-10 NOTE — REVIEW OF SYSTEMS
[As Noted in HPI] : as noted in HPI [Raynaud] : Raynaud's phenomenon was observed [Negative] : Endocrine

## 2019-01-14 ENCOUNTER — APPOINTMENT (OUTPATIENT)
Dept: RHEUMATOLOGY | Facility: CLINIC | Age: 44
End: 2019-01-14
Payer: COMMERCIAL

## 2019-01-14 VITALS
HEART RATE: 86 BPM | WEIGHT: 118 LBS | OXYGEN SATURATION: 98 % | SYSTOLIC BLOOD PRESSURE: 140 MMHG | DIASTOLIC BLOOD PRESSURE: 83 MMHG | HEIGHT: 61 IN | BODY MASS INDEX: 22.28 KG/M2 | TEMPERATURE: 97.9 F

## 2019-01-14 PROCEDURE — 99214 OFFICE O/P EST MOD 30 MIN: CPT

## 2019-01-15 ENCOUNTER — RX RENEWAL (OUTPATIENT)
Age: 44
End: 2019-01-15

## 2019-01-15 NOTE — ASSESSMENT
[FreeTextEntry1] : Diffuse Systemic Scleroderma with positive TIMO and Raynaud's\par Overlap myositis,  with neck extensor  weakness and Right winging scapula and restriction of right shoulder abduction - no improvement with prednisone\par Stable from pulmonary perspective, PFTs improved \par \par = labs\par = CellCept 2g a day\par = continue HCQ \par = lower prednisone to 2.5 mg a day \par \par \par RTO 5-6 weeks\par \par \par

## 2019-01-15 NOTE — PHYSICAL EXAM
[General Appearance - Alert] : alert [General Appearance - In No Acute Distress] : in no acute distress [Nasal Cavity] : the nasal mucosa and septum were normal [Respiration, Rhythm And Depth] : normal respiratory rhythm and effort [Auscultation Breath Sounds / Voice Sounds] : lungs were clear to auscultation bilaterally [Heart Sounds] : normal S1 and S2 [Murmurs] : no murmurs [Bowel Sounds] : normal bowel sounds [Abdomen Soft] : soft [Abdomen Tenderness] : non-tender [] : no rash [FreeTextEntry1] : + multiple telangiectasia; sclerodactyly with skin thickening extending mid  forearm and upper arms, and anterior chest wall ; few small annular lesions on the dorsum of right hand [No Focal Deficits] : no focal deficits [Oriented To Time, Place, And Person] : oriented to person, place, and time [Impaired Insight] : insight and judgment were intact

## 2019-01-15 NOTE — HISTORY OF PRESENT ILLNESS
[___ Month(s) Ago] : [unfilled] month(s) ago [de-identified] : Last seen in December 2018 [FreeTextEntry1] : Interval history :\par ----------------------------- \par = Pt currently taking cellcept 500 mg 2 tabs BID, prednisone 5 mg a day, and Plaquenil 200 mg = continues to have right shoulder   pain, but improved by 50% , .muscle weakness in right shoulder\par =  Patient denies dyspnea ,had repeat PFT, no dysphagia or telangiectasias. \par

## 2019-01-16 ENCOUNTER — RX RENEWAL (OUTPATIENT)
Age: 44
End: 2019-01-16

## 2019-01-16 LAB
25(OH)D3 SERPL-MCNC: 26.1 NG/ML
ALBUMIN SERPL ELPH-MCNC: 4.3 G/DL
ALP BLD-CCNC: 98 U/L
ALT SERPL-CCNC: 30 U/L
ANION GAP SERPL CALC-SCNC: 13 MMOL/L
AST SERPL-CCNC: 27 U/L
BASOPHILS # BLD AUTO: 0.03 K/UL
BASOPHILS NFR BLD AUTO: 0.3 %
BILIRUB SERPL-MCNC: 0.3 MG/DL
BUN SERPL-MCNC: 12 MG/DL
CALCIUM SERPL-MCNC: 9.4 MG/DL
CHLORIDE SERPL-SCNC: 104 MMOL/L
CK SERPL-CCNC: 180 U/L
CO2 SERPL-SCNC: 24 MMOL/L
CREAT SERPL-MCNC: 0.52 MG/DL
CRP SERPL-MCNC: <0.1 MG/DL
EOSINOPHIL # BLD AUTO: 0.13 K/UL
EOSINOPHIL NFR BLD AUTO: 1.3 %
ERYTHROCYTE [SEDIMENTATION RATE] IN BLOOD BY WESTERGREN METHOD: 13 MM/HR
GLUCOSE SERPL-MCNC: 109 MG/DL
HCT VFR BLD CALC: 43.6 %
HGB BLD-MCNC: 13.7 G/DL
IMM GRANULOCYTES NFR BLD AUTO: 0.3 %
LYMPHOCYTES # BLD AUTO: 1.86 K/UL
LYMPHOCYTES NFR BLD AUTO: 18.3 %
MAN DIFF?: NORMAL
MCHC RBC-ENTMCNC: 25.8 PG
MCHC RBC-ENTMCNC: 31.4 GM/DL
MCV RBC AUTO: 82.3 FL
MONOCYTES # BLD AUTO: 0.55 K/UL
MONOCYTES NFR BLD AUTO: 5.4 %
MYOGLOBIN SERPL-MCNC: 85 NG/ML
NEUTROPHILS # BLD AUTO: 7.54 K/UL
NEUTROPHILS NFR BLD AUTO: 74.4 %
PLATELET # BLD AUTO: 239 K/UL
POTASSIUM SERPL-SCNC: 4.5 MMOL/L
PROT SERPL-MCNC: 7.4 G/DL
RBC # BLD: 5.3 M/UL
RBC # FLD: 15.6 %
SODIUM SERPL-SCNC: 141 MMOL/L
WBC # FLD AUTO: 10.14 K/UL

## 2019-01-17 ENCOUNTER — EMERGENCY (EMERGENCY)
Facility: HOSPITAL | Age: 44
LOS: 1 days | Discharge: HOME | End: 2019-01-17
Admitting: EMERGENCY MEDICINE
Payer: COMMERCIAL

## 2019-01-17 VITALS
TEMPERATURE: 98 F | DIASTOLIC BLOOD PRESSURE: 86 MMHG | HEART RATE: 109 BPM | SYSTOLIC BLOOD PRESSURE: 135 MMHG | OXYGEN SATURATION: 100 % | RESPIRATION RATE: 20 BRPM

## 2019-01-17 VITALS
RESPIRATION RATE: 18 BRPM | TEMPERATURE: 97 F | HEART RATE: 85 BPM | DIASTOLIC BLOOD PRESSURE: 77 MMHG | SYSTOLIC BLOOD PRESSURE: 117 MMHG | OXYGEN SATURATION: 100 %

## 2019-01-17 DIAGNOSIS — Z98.890 OTHER SPECIFIED POSTPROCEDURAL STATES: Chronic | ICD-10-CM

## 2019-01-17 DIAGNOSIS — Z98.89 OTHER SPECIFIED POSTPROCEDURAL STATES: Chronic | ICD-10-CM

## 2019-01-17 LAB
ALBUMIN SERPL ELPH-MCNC: 4.1 G/DL — SIGNIFICANT CHANGE UP (ref 3.3–5)
ALP SERPL-CCNC: 98 U/L — SIGNIFICANT CHANGE UP (ref 40–120)
ALT FLD-CCNC: 36 U/L — HIGH (ref 4–33)
ANION GAP SERPL CALC-SCNC: 17 MMO/L — HIGH (ref 7–14)
AST SERPL-CCNC: 60 U/L — HIGH (ref 4–32)
BASOPHILS # BLD AUTO: 0.04 K/UL — SIGNIFICANT CHANGE UP (ref 0–0.2)
BASOPHILS NFR BLD AUTO: 0.5 % — SIGNIFICANT CHANGE UP (ref 0–2)
BASOPHILS NFR SPEC: 1.8 % — SIGNIFICANT CHANGE UP (ref 0–2)
BILIRUB SERPL-MCNC: 0.4 MG/DL — SIGNIFICANT CHANGE UP (ref 0.2–1.2)
BLASTS # FLD: 0 % — SIGNIFICANT CHANGE UP (ref 0–0)
BUN SERPL-MCNC: 8 MG/DL — SIGNIFICANT CHANGE UP (ref 7–23)
CALCIUM SERPL-MCNC: 9.5 MG/DL — SIGNIFICANT CHANGE UP (ref 8.4–10.5)
CHLORIDE SERPL-SCNC: 102 MMOL/L — SIGNIFICANT CHANGE UP (ref 98–107)
CO2 SERPL-SCNC: 21 MMOL/L — LOW (ref 22–31)
CREAT SERPL-MCNC: 0.5 MG/DL — SIGNIFICANT CHANGE UP (ref 0.5–1.3)
EOSINOPHIL # BLD AUTO: 0.26 K/UL — SIGNIFICANT CHANGE UP (ref 0–0.5)
EOSINOPHIL NFR BLD AUTO: 3.1 % — SIGNIFICANT CHANGE UP (ref 0–6)
EOSINOPHIL NFR FLD: 0 % — SIGNIFICANT CHANGE UP (ref 0–6)
GLUCOSE SERPL-MCNC: 82 MG/DL — SIGNIFICANT CHANGE UP (ref 70–99)
HCT VFR BLD CALC: 42.8 % — SIGNIFICANT CHANGE UP (ref 34.5–45)
HGB BLD-MCNC: 13.4 G/DL — SIGNIFICANT CHANGE UP (ref 11.5–15.5)
IMM GRANULOCYTES NFR BLD AUTO: 0.5 % — SIGNIFICANT CHANGE UP (ref 0–1.5)
LYMPHOCYTES # BLD AUTO: 1.92 K/UL — SIGNIFICANT CHANGE UP (ref 1–3.3)
LYMPHOCYTES # BLD AUTO: 22.9 % — SIGNIFICANT CHANGE UP (ref 13–44)
LYMPHOCYTES NFR SPEC AUTO: 14.9 % — SIGNIFICANT CHANGE UP (ref 13–44)
MCHC RBC-ENTMCNC: 25.5 PG — LOW (ref 27–34)
MCHC RBC-ENTMCNC: 31.3 % — LOW (ref 32–36)
MCV RBC AUTO: 81.4 FL — SIGNIFICANT CHANGE UP (ref 80–100)
METAMYELOCYTES # FLD: 0 % — SIGNIFICANT CHANGE UP (ref 0–1)
MONOCYTES # BLD AUTO: 1.13 K/UL — HIGH (ref 0–0.9)
MONOCYTES NFR BLD AUTO: 13.5 % — SIGNIFICANT CHANGE UP (ref 2–14)
MONOCYTES NFR BLD: 10.5 % — HIGH (ref 2–9)
MYELOCYTES NFR BLD: 0 % — SIGNIFICANT CHANGE UP (ref 0–0)
NEUTROPHIL AB SER-ACNC: 71 % — SIGNIFICANT CHANGE UP (ref 43–77)
NEUTROPHILS # BLD AUTO: 4.98 K/UL — SIGNIFICANT CHANGE UP (ref 1.8–7.4)
NEUTROPHILS NFR BLD AUTO: 59.5 % — SIGNIFICANT CHANGE UP (ref 43–77)
NEUTS BAND # BLD: 0 % — SIGNIFICANT CHANGE UP (ref 0–6)
NRBC # FLD: 0 K/UL — LOW (ref 25–125)
OTHER - HEMATOLOGY %: 0 — SIGNIFICANT CHANGE UP
PLATELET # BLD AUTO: 203 K/UL — SIGNIFICANT CHANGE UP (ref 150–400)
PMV BLD: 11.6 FL — SIGNIFICANT CHANGE UP (ref 7–13)
POTASSIUM SERPL-MCNC: 4.8 MMOL/L — SIGNIFICANT CHANGE UP (ref 3.5–5.3)
POTASSIUM SERPL-SCNC: 4.8 MMOL/L — SIGNIFICANT CHANGE UP (ref 3.5–5.3)
PROMYELOCYTES # FLD: 0 % — SIGNIFICANT CHANGE UP (ref 0–0)
PROT SERPL-MCNC: 7.7 G/DL — SIGNIFICANT CHANGE UP (ref 6–8.3)
RBC # BLD: 5.26 M/UL — HIGH (ref 3.8–5.2)
RBC # FLD: 14.6 % — HIGH (ref 10.3–14.5)
SODIUM SERPL-SCNC: 140 MMOL/L — SIGNIFICANT CHANGE UP (ref 135–145)
TROPONIN T, HIGH SENSITIVITY: 30 NG/L — SIGNIFICANT CHANGE UP (ref ?–14)
TROPONIN T, HIGH SENSITIVITY: 30 NG/L — SIGNIFICANT CHANGE UP (ref ?–14)
VARIANT LYMPHS # BLD: 1.8 % — SIGNIFICANT CHANGE UP
WBC # BLD: 8.37 K/UL — SIGNIFICANT CHANGE UP (ref 3.8–10.5)
WBC # FLD AUTO: 8.37 K/UL — SIGNIFICANT CHANGE UP (ref 3.8–10.5)

## 2019-01-17 PROCEDURE — 99284 EMERGENCY DEPT VISIT MOD MDM: CPT | Mod: 25

## 2019-01-17 PROCEDURE — 71046 X-RAY EXAM CHEST 2 VIEWS: CPT | Mod: 26

## 2019-01-17 PROCEDURE — 93010 ELECTROCARDIOGRAM REPORT: CPT

## 2019-01-17 RX ORDER — ACETAMINOPHEN 500 MG
975 TABLET ORAL ONCE
Qty: 0 | Refills: 0 | Status: COMPLETED | OUTPATIENT
Start: 2019-01-17 | End: 2019-01-17

## 2019-01-17 RX ORDER — SODIUM CHLORIDE 9 MG/ML
1000 INJECTION INTRAMUSCULAR; INTRAVENOUS; SUBCUTANEOUS ONCE
Qty: 0 | Refills: 0 | Status: COMPLETED | OUTPATIENT
Start: 2019-01-17 | End: 2019-01-17

## 2019-01-17 RX ADMIN — Medication 100 MILLIGRAM(S): at 17:10

## 2019-01-17 RX ADMIN — SODIUM CHLORIDE 1000 MILLILITER(S): 9 INJECTION INTRAMUSCULAR; INTRAVENOUS; SUBCUTANEOUS at 15:26

## 2019-01-17 RX ADMIN — Medication 975 MILLIGRAM(S): at 15:26

## 2019-01-17 NOTE — ED PROVIDER NOTE - NSFOLLOWUPINSTRUCTIONS_ED_ALL_ED_FT
Follow up with your primary doctor, take motrin and or tylenol as needed for pain, return to ER if pain persists or worsens.  Advance activity as tolerated.  Continue all previously prescribed medications as directed.  Follow up with your primary care physician in 48-72 hours- bring copies of your results.  Return to the ER for worsening or persistent symptoms, and/or ANY NEW OR CONCERNING SYMPTOMS. If you have issues obtaining follow up, please call: [phone] to obtain a doctor or specialist who takes your insurance in your area.  You may call [phone] to make an appointment with the internal medicine clinic.

## 2019-01-17 NOTE — ED PROVIDER NOTE - PROGRESS NOTE DETAILS
WILI Womack: Pt reports feeling better, trop unchanged, likely viral syndrome with costochondritis. Pt told to F/U with PMD.

## 2019-01-17 NOTE — ED PROVIDER NOTE - PHYSICAL EXAMINATION
Head; NC/AT Eyes: PERRL Mouth:  Dentition in grossly good repair. Throat: Trace pharyngeal erythema no exudate no abscess uvula midline and rises symmetrically with phonation.

## 2019-01-17 NOTE — ED PROVIDER NOTE - OBJECTIVE STATEMENT
44 Y/O F PMH scleroderma hyperthyroid states she had a normal 44 Y/O F PMH scleroderma hyperthyroid states C/O dull CP in epigastrium/lower chest constant since last night described as a 5/10 worsened by coughing or pressure. States cough is non productive, denies fever or chills, admits to mild sore throat, denies any other acute complaints or symptoms. She states she is only sob when coughing denies ABD PN N V D Dizz or any other acute symptoms or complaints. 44 Y/O F PMH scleroderma hyperthyroid states C/O dull CP in epigastrium/lower chest constant since last night described as a 5/10 worsened by coughing or pressure. States cough is non productive, denies fever or chills, admits to mild sore throat, denies any other acute complaints or symptoms. She states she is only sob when coughing denies ABD PN N V D Dizz or any other acute symptoms or complaints. Pt state she had high troponin in the past, had an angiogram with no cath 2 years ago as per pt. CP is worse with coughing or palpation.

## 2019-01-17 NOTE — ED ADULT NURSE NOTE - OBJECTIVE STATEMENT
Received pt. in Intake room 3 alert and oriented x 4 complaining of a headache and a consistent dry cough. Pt. stated " I have been coughing for 3 days and I have a headache. Medicated as ordered, will continue to monitor.

## 2019-01-17 NOTE — ED PROVIDER NOTE - CARE PLAN
Principal Discharge DX:	Cough  Secondary Diagnosis:	Chest pain Principal Discharge DX:	Cough  Secondary Diagnosis:	Chest pain  Secondary Diagnosis:	Viral syndrome

## 2019-01-18 ENCOUNTER — MESSAGE (OUTPATIENT)
Age: 44
End: 2019-01-18

## 2019-01-18 NOTE — DISCUSSION/SUMMARY
[ED] : a call from ED [FreeTextEntry1] : 42 Y/O F PMH scleroderma hyperthyroid presented to the ED with dull CP in epigastrium/lower chest since 1/16, described as a 5/10 worsened by coughing or pressure. EKG, CXR, trop's negative. Diagnosed with likely viral illness and d/c'ed. Patient states her sx's have resolved, denies further CP/cough, no fever/chills. Instructed to schedule a medicine appointment.\par \ly Cuadra

## 2019-01-25 ENCOUNTER — TRANSCRIPTION ENCOUNTER (OUTPATIENT)
Age: 44
End: 2019-01-25

## 2019-02-05 ENCOUNTER — NON-APPOINTMENT (OUTPATIENT)
Age: 44
End: 2019-02-05

## 2019-02-05 ENCOUNTER — APPOINTMENT (OUTPATIENT)
Dept: CARDIOLOGY | Facility: CLINIC | Age: 44
End: 2019-02-05
Payer: COMMERCIAL

## 2019-02-05 VITALS
BODY MASS INDEX: 21.92 KG/M2 | DIASTOLIC BLOOD PRESSURE: 78 MMHG | OXYGEN SATURATION: 99 % | SYSTOLIC BLOOD PRESSURE: 110 MMHG | HEART RATE: 89 BPM | WEIGHT: 116 LBS

## 2019-02-05 DIAGNOSIS — R00.2 PALPITATIONS: ICD-10-CM

## 2019-02-05 PROCEDURE — 93000 ELECTROCARDIOGRAM COMPLETE: CPT

## 2019-02-05 PROCEDURE — 99215 OFFICE O/P EST HI 40 MIN: CPT

## 2019-02-05 NOTE — HISTORY OF PRESENT ILLNESS
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 She is now on steroids.\par palpitations returned after stopping nifedipine, improved when restarted in mid January.\par There is rare dyspena with the palpitaiotns, No near syncope, but fatigued.\par Eating and drinking well.\par \par Her CPK remained elevated but nearing normal.

## 2019-02-05 NOTE — DISCUSSION/SUMMARY
[FreeTextEntry1] : She is a 43-year-old with scleroderma and recently elevated cardiac enzymes (macro CK type I )and normal ejection fraction and normal coronary angiogram. Her cardiac MRI showed no evidence of prior infarction or inflammation.\par Palpitations: Holter to exclude significant arrhythmias. Plan to continue nifedipine. Doubt dangerous source of palpitioions. Encouraged her to keep herself well-hydrated as orthostasis may worsen her palpitations.\par Routine followup in 3 months.

## 2019-02-05 NOTE — PHYSICAL EXAM
[Normal Conjunctiva] : the conjunctiva exhibited no abnormalities [No Oral Pallor] : no oral pallor [Normal Jugular Venous V Waves Present] : normal jugular venous V waves present [Respiration, Rhythm And Depth] : normal respiratory rhythm and effort [Auscultation Breath Sounds / Voice Sounds] : lungs were clear to auscultation bilaterally [Bowel Sounds] : normal bowel sounds [Abdomen Soft] : soft [Abnormal Walk] : normal gait [Cyanosis, Localized] : no localized cyanosis [Oriented To Time, Place, And Person] : oriented to person, place, and time [Impaired Insight] : insight and judgment were intact [FreeTextEntry1] : Stigmata of scleroderma

## 2019-02-07 ENCOUNTER — APPOINTMENT (OUTPATIENT)
Dept: INTERNAL MEDICINE | Facility: HOSPITAL | Age: 44
End: 2019-02-07

## 2019-02-11 ENCOUNTER — NON-APPOINTMENT (OUTPATIENT)
Age: 44
End: 2019-02-11

## 2019-02-21 ENCOUNTER — TRANSCRIPTION ENCOUNTER (OUTPATIENT)
Age: 44
End: 2019-02-21

## 2019-02-21 ENCOUNTER — RX RENEWAL (OUTPATIENT)
Age: 44
End: 2019-02-21

## 2019-02-21 ENCOUNTER — NON-APPOINTMENT (OUTPATIENT)
Age: 44
End: 2019-02-21

## 2019-03-06 ENCOUNTER — TRANSCRIPTION ENCOUNTER (OUTPATIENT)
Age: 44
End: 2019-03-06

## 2019-03-19 ENCOUNTER — OTHER (OUTPATIENT)
Age: 44
End: 2019-03-19

## 2019-03-19 ENCOUNTER — APPOINTMENT (OUTPATIENT)
Dept: RHEUMATOLOGY | Facility: CLINIC | Age: 44
End: 2019-03-19
Payer: COMMERCIAL

## 2019-03-19 VITALS
WEIGHT: 118 LBS | SYSTOLIC BLOOD PRESSURE: 125 MMHG | TEMPERATURE: 97.6 F | HEIGHT: 60 IN | OXYGEN SATURATION: 98 % | RESPIRATION RATE: 14 BRPM | BODY MASS INDEX: 23.16 KG/M2 | DIASTOLIC BLOOD PRESSURE: 83 MMHG | HEART RATE: 88 BPM

## 2019-03-19 PROCEDURE — 99214 OFFICE O/P EST MOD 30 MIN: CPT

## 2019-03-19 RX ORDER — ZOSTER VACCINE RECOMBINANT, ADJUVANTED 50 MCG/0.5
50 KIT INTRAMUSCULAR
Qty: 1 | Refills: 0 | Status: COMPLETED | COMMUNITY
Start: 2018-07-25 | End: 2019-03-19

## 2019-03-19 RX ORDER — ZOSTER VACCINE RECOMBINANT, ADJUVANTED 50 MCG/0.5
50 KIT INTRAMUSCULAR
Qty: 1 | Refills: 0 | Status: COMPLETED | COMMUNITY
Start: 2018-12-21 | End: 2019-03-19

## 2019-03-19 RX ORDER — ZOSTER VACCINE RECOMBINANT, ADJUVANTED 50 MCG/0.5
50 KIT INTRAMUSCULAR ONCE
Qty: 1 | Refills: 0 | Status: COMPLETED | COMMUNITY
Start: 2018-10-31 | End: 2019-03-19

## 2019-03-20 LAB
ALBUMIN SERPL ELPH-MCNC: 4.4 G/DL
ALP BLD-CCNC: 104 U/L
ALT SERPL-CCNC: 22 U/L
ANION GAP SERPL CALC-SCNC: 12 MMOL/L
AST SERPL-CCNC: 26 U/L
BASOPHILS # BLD AUTO: 0.04 K/UL
BASOPHILS NFR BLD AUTO: 0.5 %
BILIRUB SERPL-MCNC: 0.4 MG/DL
BUN SERPL-MCNC: 8 MG/DL
CALCIUM SERPL-MCNC: 9.6 MG/DL
CHLORIDE SERPL-SCNC: 102 MMOL/L
CK SERPL-CCNC: 136 U/L
CO2 SERPL-SCNC: 26 MMOL/L
CREAT SERPL-MCNC: 0.49 MG/DL
CRP SERPL-MCNC: <0.1 MG/DL
EOSINOPHIL # BLD AUTO: 0.15 K/UL
EOSINOPHIL NFR BLD AUTO: 1.8 %
ERYTHROCYTE [SEDIMENTATION RATE] IN BLOOD BY WESTERGREN METHOD: 19 MM/HR
GLUCOSE SERPL-MCNC: 85 MG/DL
HCT VFR BLD CALC: 44.5 %
HGB BLD-MCNC: 13.2 G/DL
IMM GRANULOCYTES NFR BLD AUTO: 0.5 %
LDH SERPL-CCNC: 167 U/L
LYMPHOCYTES # BLD AUTO: 3.02 K/UL
LYMPHOCYTES NFR BLD AUTO: 36.8 %
MAN DIFF?: NORMAL
MCHC RBC-ENTMCNC: 25.5 PG
MCHC RBC-ENTMCNC: 29.7 GM/DL
MCV RBC AUTO: 86.1 FL
MONOCYTES # BLD AUTO: 0.8 K/UL
MONOCYTES NFR BLD AUTO: 9.7 %
MYOGLOBIN SERPL-MCNC: 55 NG/ML
NEUTROPHILS # BLD AUTO: 4.16 K/UL
NEUTROPHILS NFR BLD AUTO: 50.7 %
PLATELET # BLD AUTO: 172 K/UL
POTASSIUM SERPL-SCNC: 4 MMOL/L
PROT SERPL-MCNC: 7 G/DL
RBC # BLD: 5.17 M/UL
RBC # FLD: 15.2 %
SODIUM SERPL-SCNC: 140 MMOL/L
WBC # FLD AUTO: 8.21 K/UL

## 2019-04-04 ENCOUNTER — APPOINTMENT (OUTPATIENT)
Dept: PULMONOLOGY | Facility: CLINIC | Age: 44
End: 2019-04-04
Payer: COMMERCIAL

## 2019-04-04 PROCEDURE — 94726 PLETHYSMOGRAPHY LUNG VOLUMES: CPT

## 2019-04-04 PROCEDURE — 94010 BREATHING CAPACITY TEST: CPT

## 2019-04-04 PROCEDURE — 94729 DIFFUSING CAPACITY: CPT

## 2019-05-24 ENCOUNTER — APPOINTMENT (OUTPATIENT)
Dept: RHEUMATOLOGY | Facility: CLINIC | Age: 44
End: 2019-05-24
Payer: COMMERCIAL

## 2019-05-24 VITALS
RESPIRATION RATE: 14 BRPM | WEIGHT: 117 LBS | SYSTOLIC BLOOD PRESSURE: 122 MMHG | HEIGHT: 60 IN | HEART RATE: 92 BPM | TEMPERATURE: 97.9 F | OXYGEN SATURATION: 98 % | BODY MASS INDEX: 22.97 KG/M2 | DIASTOLIC BLOOD PRESSURE: 84 MMHG

## 2019-05-24 PROCEDURE — 99214 OFFICE O/P EST MOD 30 MIN: CPT

## 2019-05-25 LAB
25(OH)D3 SERPL-MCNC: 22.2 NG/ML
ALBUMIN SERPL ELPH-MCNC: 4.4 G/DL
ALP BLD-CCNC: 103 U/L
ALT SERPL-CCNC: 13 U/L
ANION GAP SERPL CALC-SCNC: 13 MMOL/L
APPEARANCE: CLEAR
AST SERPL-CCNC: 16 U/L
BACTERIA: NEGATIVE
BASOPHILS # BLD AUTO: 0.03 K/UL
BASOPHILS NFR BLD AUTO: 0.4 %
BILIRUB SERPL-MCNC: 0.5 MG/DL
BILIRUBIN URINE: NEGATIVE
BLOOD URINE: ABNORMAL
BUN SERPL-MCNC: 8 MG/DL
CALCIUM SERPL-MCNC: 9.6 MG/DL
CHLORIDE SERPL-SCNC: 101 MMOL/L
CHOLEST SERPL-MCNC: 183 MG/DL
CHOLEST/HDLC SERPL: 4.7 RATIO
CK SERPL-CCNC: 136 U/L
CO2 SERPL-SCNC: 25 MMOL/L
COLOR: NORMAL
CREAT SERPL-MCNC: 0.46 MG/DL
CREAT SPEC-SCNC: 58 MG/DL
CREAT/PROT UR: 0.2 RATIO
CRP SERPL-MCNC: <0.1 MG/DL
EOSINOPHIL # BLD AUTO: 0.11 K/UL
EOSINOPHIL NFR BLD AUTO: 1.5 %
ERYTHROCYTE [SEDIMENTATION RATE] IN BLOOD BY WESTERGREN METHOD: 30 MM/HR
ESTIMATED AVERAGE GLUCOSE: 97 MG/DL
GLUCOSE QUALITATIVE U: NEGATIVE
GLUCOSE SERPL-MCNC: 74 MG/DL
HBA1C MFR BLD HPLC: 5 %
HCT VFR BLD CALC: 44.6 %
HDLC SERPL-MCNC: 39 MG/DL
HGB BLD-MCNC: 13.6 G/DL
HYALINE CASTS: 1 /LPF
IMM GRANULOCYTES NFR BLD AUTO: 0.3 %
KETONES URINE: NEGATIVE
LDLC SERPL CALC-MCNC: 101 MG/DL
LEUKOCYTE ESTERASE URINE: NEGATIVE
LYMPHOCYTES # BLD AUTO: 2.29 K/UL
LYMPHOCYTES NFR BLD AUTO: 30.5 %
MAN DIFF?: NORMAL
MCHC RBC-ENTMCNC: 25.9 PG
MCHC RBC-ENTMCNC: 30.5 GM/DL
MCV RBC AUTO: 84.8 FL
MICROSCOPIC-UA: NORMAL
MONOCYTES # BLD AUTO: 0.77 K/UL
MONOCYTES NFR BLD AUTO: 10.2 %
MYOGLOBIN SERPL-MCNC: 54 NG/ML
NEUTROPHILS # BLD AUTO: 4.3 K/UL
NEUTROPHILS NFR BLD AUTO: 57.1 %
NITRITE URINE: NEGATIVE
PH URINE: 5.5
PLATELET # BLD AUTO: 185 K/UL
POTASSIUM SERPL-SCNC: 4.1 MMOL/L
PROT SERPL-MCNC: 7.5 G/DL
PROT UR-MCNC: 11 MG/DL
PROTEIN URINE: NORMAL
RBC # BLD: 5.26 M/UL
RBC # FLD: 14.3 %
RED BLOOD CELLS URINE: 2 /HPF
SODIUM SERPL-SCNC: 139 MMOL/L
SPECIFIC GRAVITY URINE: 1.01
SQUAMOUS EPITHELIAL CELLS: 6 /HPF
T4 FREE SERPL-MCNC: 1.7 NG/DL
TRIGL SERPL-MCNC: 214 MG/DL
TSH SERPL-ACNC: 0.16 UIU/ML
UROBILINOGEN URINE: NORMAL
WBC # FLD AUTO: 7.52 K/UL
WHITE BLOOD CELLS URINE: 1 /HPF

## 2019-05-25 RX ORDER — PREDNISONE 1 MG/1
1 TABLET ORAL
Qty: 45 | Refills: 2 | Status: DISCONTINUED | COMMUNITY
Start: 2018-02-13 | End: 2019-05-25

## 2019-06-05 ENCOUNTER — APPOINTMENT (OUTPATIENT)
Dept: INTERNAL MEDICINE | Facility: CLINIC | Age: 44
End: 2019-06-05
Payer: COMMERCIAL

## 2019-06-05 ENCOUNTER — OUTPATIENT (OUTPATIENT)
Dept: OUTPATIENT SERVICES | Facility: HOSPITAL | Age: 44
LOS: 1 days | End: 2019-06-05

## 2019-06-05 VITALS
DIASTOLIC BLOOD PRESSURE: 66 MMHG | WEIGHT: 120 LBS | SYSTOLIC BLOOD PRESSURE: 118 MMHG | BODY MASS INDEX: 22.08 KG/M2 | HEART RATE: 87 BPM | OXYGEN SATURATION: 98 % | HEIGHT: 62 IN

## 2019-06-05 DIAGNOSIS — Z98.890 OTHER SPECIFIED POSTPROCEDURAL STATES: Chronic | ICD-10-CM

## 2019-06-05 DIAGNOSIS — R74.8 ABNORMAL LEVELS OF OTHER SERUM ENZYMES: ICD-10-CM

## 2019-06-05 DIAGNOSIS — R59.0 LOCALIZED ENLARGED LYMPH NODES: ICD-10-CM

## 2019-06-05 DIAGNOSIS — M79.674 PAIN IN RIGHT TOE(S): ICD-10-CM

## 2019-06-05 DIAGNOSIS — N60.01 SOLITARY CYST OF RIGHT BREAST: ICD-10-CM

## 2019-06-05 DIAGNOSIS — Z87.448 PERSONAL HISTORY OF OTHER DISEASES OF URINARY SYSTEM: ICD-10-CM

## 2019-06-05 DIAGNOSIS — Z86.19 PERSONAL HISTORY OF OTHER INFECTIOUS AND PARASITIC DISEASES: ICD-10-CM

## 2019-06-05 DIAGNOSIS — Z98.89 OTHER SPECIFIED POSTPROCEDURAL STATES: Chronic | ICD-10-CM

## 2019-06-05 PROCEDURE — 99213 OFFICE O/P EST LOW 20 MIN: CPT

## 2019-06-07 PROBLEM — M79.674 PAIN OF TOE OF RIGHT FOOT: Status: RESOLVED | Noted: 2018-01-08 | Resolved: 2019-06-07

## 2019-06-07 PROBLEM — Z87.448 HISTORY OF PYELONEPHRITIS: Status: RESOLVED | Noted: 2018-11-19 | Resolved: 2019-06-07

## 2019-06-07 PROBLEM — R74.8 ELEVATED CPK: Status: RESOLVED | Noted: 2017-07-28 | Resolved: 2019-06-07

## 2019-06-07 PROBLEM — Z86.19 HISTORY OF DERMATOPHYTOSIS: Status: RESOLVED | Noted: 2018-07-25 | Resolved: 2019-06-07

## 2019-06-07 PROBLEM — N60.01 CYST OF RIGHT BREAST: Status: RESOLVED | Noted: 2018-07-11 | Resolved: 2019-06-07

## 2019-06-07 NOTE — HISTORY OF PRESENT ILLNESS
[FreeTextEntry1] : follow up chronic conditions,  [de-identified] : 42 yo female with mixed connective tissue disease and hypothyroidism presents for follow up of her chronic conditions. She states that her thyroid medication and vitamin d level has to be adjusted according to the Rheumatologist review of recent labs.\par \par She is noticing that she is losing hair for about 4 to 5 months.  Also not remembering things, occuring for about 3-4 mnths, worsening recently, denies feeling depressed.  \par Also complaining of fingers hurting more than before, especially when it is cold outside.

## 2019-06-07 NOTE — ASSESSMENT
[FreeTextEntry1] : Assessment as indicated above in impressions with plans through orders below.\par

## 2019-06-07 NOTE — PHYSICAL EXAM
[No Respiratory Distress] : no respiratory distress  [No Acute Distress] : no acute distress [Clear to Auscultation] : lungs were clear to auscultation bilaterally [Regular Rhythm] : with a regular rhythm [Normal Rate] : normal rate  [Normal S1, S2] : normal S1 and S2

## 2019-06-07 NOTE — PHYSICAL EXAM
[No Respiratory Distress] : no respiratory distress  [No Acute Distress] : no acute distress [Regular Rhythm] : with a regular rhythm [Normal Rate] : normal rate  [Clear to Auscultation] : lungs were clear to auscultation bilaterally [Normal S1, S2] : normal S1 and S2

## 2019-06-07 NOTE — HISTORY OF PRESENT ILLNESS
[FreeTextEntry1] : follow up chronic conditions,  [de-identified] : 44 yo female with mixed connective tissue disease and hypothyroidism presents for follow up of her chronic conditions. She states that her thyroid medication and vitamin d level has to be adjusted according to the Rheumatologist review of recent labs.\par \par She is noticing that she is losing hair for about 4 to 5 months.  Also not remembering things, occuring for about 3-4 mnths, worsening recently, denies feeling depressed.  \par Also complaining of fingers hurting more than before, especially when it is cold outside.

## 2019-06-10 DIAGNOSIS — I73.00 RAYNAUD'S SYNDROME WITHOUT GANGRENE: ICD-10-CM

## 2019-06-10 DIAGNOSIS — E03.9 HYPOTHYROIDISM, UNSPECIFIED: ICD-10-CM

## 2019-06-10 DIAGNOSIS — M35.1 OTHER OVERLAP SYNDROMES: ICD-10-CM

## 2019-06-17 ENCOUNTER — FORM ENCOUNTER (OUTPATIENT)
Age: 44
End: 2019-06-17

## 2019-06-18 ENCOUNTER — APPOINTMENT (OUTPATIENT)
Dept: RADIOLOGY | Facility: CLINIC | Age: 44
End: 2019-06-18
Payer: COMMERCIAL

## 2019-06-18 ENCOUNTER — OUTPATIENT (OUTPATIENT)
Dept: OUTPATIENT SERVICES | Facility: HOSPITAL | Age: 44
LOS: 1 days | End: 2019-06-18
Payer: MEDICAID

## 2019-06-18 DIAGNOSIS — Z00.8 ENCOUNTER FOR OTHER GENERAL EXAMINATION: ICD-10-CM

## 2019-06-18 DIAGNOSIS — Z98.890 OTHER SPECIFIED POSTPROCEDURAL STATES: Chronic | ICD-10-CM

## 2019-06-18 DIAGNOSIS — Z98.89 OTHER SPECIFIED POSTPROCEDURAL STATES: Chronic | ICD-10-CM

## 2019-06-18 LAB
ALBUMIN SERPL ELPH-MCNC: 4.4 G/DL
ALP BLD-CCNC: 81 U/L
ALT SERPL-CCNC: 17 U/L
ANION GAP SERPL CALC-SCNC: 10 MMOL/L
AST SERPL-CCNC: 19 U/L
BASOPHILS # BLD AUTO: 0.02 K/UL
BASOPHILS NFR BLD AUTO: 0.3 %
BILIRUB SERPL-MCNC: 0.4 MG/DL
BUN SERPL-MCNC: 11 MG/DL
CALCIUM SERPL-MCNC: 9 MG/DL
CHLORIDE SERPL-SCNC: 104 MMOL/L
CK SERPL-CCNC: 150 U/L
CO2 SERPL-SCNC: 28 MMOL/L
CREAT SERPL-MCNC: 0.48 MG/DL
CRP SERPL-MCNC: <0.1 MG/DL
EOSINOPHIL # BLD AUTO: 0.15 K/UL
EOSINOPHIL NFR BLD AUTO: 2.2 %
ERYTHROCYTE [SEDIMENTATION RATE] IN BLOOD BY WESTERGREN METHOD: 11 MM/HR
GLUCOSE SERPL-MCNC: 89 MG/DL
HCT VFR BLD CALC: 40 %
HGB BLD-MCNC: 13 G/DL
IMM GRANULOCYTES NFR BLD AUTO: 0.3 %
LYMPHOCYTES # BLD AUTO: 2.14 K/UL
LYMPHOCYTES NFR BLD AUTO: 31.8 %
MAN DIFF?: NORMAL
MCHC RBC-ENTMCNC: 26.5 PG
MCHC RBC-ENTMCNC: 32.5 GM/DL
MCV RBC AUTO: 81.6 FL
MONOCYTES # BLD AUTO: 0.74 K/UL
MONOCYTES NFR BLD AUTO: 11 %
NEUTROPHILS # BLD AUTO: 3.67 K/UL
NEUTROPHILS NFR BLD AUTO: 54.4 %
PLATELET # BLD AUTO: 187 K/UL
POTASSIUM SERPL-SCNC: 4 MMOL/L
PROT SERPL-MCNC: 6.9 G/DL
RBC # BLD: 4.9 M/UL
RBC # FLD: 14.9 %
SODIUM SERPL-SCNC: 142 MMOL/L
T3 SERPL-MCNC: 98 NG/DL
T4 FREE SERPL-MCNC: 1.6 NG/DL
WBC # FLD AUTO: 6.74 K/UL

## 2019-06-18 PROCEDURE — 77080 DXA BONE DENSITY AXIAL: CPT | Mod: 26

## 2019-06-18 PROCEDURE — 77080 DXA BONE DENSITY AXIAL: CPT

## 2019-06-24 ENCOUNTER — RESULT REVIEW (OUTPATIENT)
Age: 44
End: 2019-06-24

## 2019-06-28 ENCOUNTER — RX CHANGE (OUTPATIENT)
Age: 44
End: 2019-06-28

## 2019-06-28 RX ORDER — PNV NO.153/FA/OM3/DHA/EPA/FISH 400-35-25
125 MCG TABLET,CHEWABLE ORAL DAILY
Qty: 30 | Refills: 1 | Status: DISCONTINUED | COMMUNITY
Start: 2017-01-22 | End: 2019-06-28

## 2019-07-09 ENCOUNTER — TRANSCRIPTION ENCOUNTER (OUTPATIENT)
Age: 44
End: 2019-07-09

## 2019-07-31 ENCOUNTER — RX RENEWAL (OUTPATIENT)
Age: 44
End: 2019-07-31

## 2019-08-05 ENCOUNTER — LABORATORY RESULT (OUTPATIENT)
Age: 44
End: 2019-08-05

## 2019-08-05 ENCOUNTER — APPOINTMENT (OUTPATIENT)
Dept: RHEUMATOLOGY | Facility: CLINIC | Age: 44
End: 2019-08-05
Payer: MEDICAID

## 2019-08-05 VITALS
SYSTOLIC BLOOD PRESSURE: 114 MMHG | BODY MASS INDEX: 22.08 KG/M2 | DIASTOLIC BLOOD PRESSURE: 78 MMHG | HEART RATE: 78 BPM | HEIGHT: 62 IN | WEIGHT: 120 LBS | TEMPERATURE: 97.8 F | OXYGEN SATURATION: 98 %

## 2019-08-05 PROCEDURE — 99214 OFFICE O/P EST MOD 30 MIN: CPT

## 2019-08-06 ENCOUNTER — RX RENEWAL (OUTPATIENT)
Age: 44
End: 2019-08-06

## 2019-08-07 ENCOUNTER — APPOINTMENT (OUTPATIENT)
Dept: INTERNAL MEDICINE | Facility: CLINIC | Age: 44
End: 2019-08-07
Payer: COMMERCIAL

## 2019-08-07 ENCOUNTER — RX RENEWAL (OUTPATIENT)
Age: 44
End: 2019-08-07

## 2019-08-07 ENCOUNTER — OUTPATIENT (OUTPATIENT)
Dept: OUTPATIENT SERVICES | Facility: HOSPITAL | Age: 44
LOS: 1 days | End: 2019-08-07

## 2019-08-07 VITALS
HEART RATE: 98 BPM | BODY MASS INDEX: 21.9 KG/M2 | DIASTOLIC BLOOD PRESSURE: 70 MMHG | OXYGEN SATURATION: 98 % | HEIGHT: 61 IN | SYSTOLIC BLOOD PRESSURE: 100 MMHG | WEIGHT: 116 LBS

## 2019-08-07 DIAGNOSIS — Z71.89 OTHER SPECIFIED COUNSELING: ICD-10-CM

## 2019-08-07 DIAGNOSIS — Z98.89 OTHER SPECIFIED POSTPROCEDURAL STATES: Chronic | ICD-10-CM

## 2019-08-07 DIAGNOSIS — M17.10 UNILATERAL PRIMARY OSTEOARTHRITIS, UNSPECIFIED KNEE: ICD-10-CM

## 2019-08-07 DIAGNOSIS — M34.9 SYSTEMIC SCLEROSIS, UNSPECIFIED: ICD-10-CM

## 2019-08-07 DIAGNOSIS — Z98.890 OTHER SPECIFIED POSTPROCEDURAL STATES: Chronic | ICD-10-CM

## 2019-08-07 DIAGNOSIS — F32.0 MAJOR DEPRESSIVE DISORDER, SINGLE EPISODE, MILD: ICD-10-CM

## 2019-08-07 DIAGNOSIS — M95.8 OTHER SPECIFIED ACQUIRED DEFORMITIES OF MUSCULOSKELETAL SYSTEM: ICD-10-CM

## 2019-08-07 DIAGNOSIS — J84.9 INTERSTITIAL PULMONARY DISEASE, UNSPECIFIED: ICD-10-CM

## 2019-08-07 DIAGNOSIS — E03.9 HYPOTHYROIDISM, UNSPECIFIED: ICD-10-CM

## 2019-08-07 DIAGNOSIS — M85.80 OTHER SPECIFIED DISORDERS OF BONE DENSITY AND STRUCTURE, UNSPECIFIED SITE: ICD-10-CM

## 2019-08-07 LAB — TSH SERPL-ACNC: 0.52

## 2019-08-07 PROCEDURE — 99213 OFFICE O/P EST LOW 20 MIN: CPT

## 2019-08-07 NOTE — PHYSICAL EXAM
[No Acute Distress] : no acute distress [Well Nourished] : well nourished [Normal Voice/Communication] : normal voice/communication [de-identified] : Limited range of motion of both arms right being worse than left.  Able to abduct left arm to 90 degrees right arm to about 30 degrees.  Limited extension as well.  Knees without effusion at today's visit, full range of motion no crepitus appreciated.

## 2019-08-07 NOTE — HISTORY OF PRESENT ILLNESS
[FreeTextEntry1] : Follow-up of scleroderma and hypothyroidism. [de-identified] : Ms. NICOLAS LAL  is a 43-year-old female with scleroderma, hypothyroidism, interstitial lung disease and right winged scapula who presents for follow-up of her chronic disease.  Patient continues to have right shoulder pain as well as left shoulder pain and knee pains, she would like a referral to physical therapy for assistance.\par \par Patient reports having her in-laws present in the house until the end of September.  Due to her in-laws visiting she has increased household responsibilities where she is constantly cooking and doing household chores.  She rarely is able to have a break, and at times finds her self with diminished appetite due to fatigue.  She is feeling sad and concerned about her teenage children whom she feels she is unable to give quality time to.\par \par Social history: Patient denies any mental or physical abuse from her .  Mother of 2.

## 2019-08-07 NOTE — REVIEW OF SYSTEMS
[Fatigue] : fatigue [Negative] : Respiratory [Fever] : no fever [Chills] : no chills [Lower Ext Edema] : no lower extremity edema [Chest Pain] : no chest pain [Shortness Of Breath] : no shortness of breath [de-identified] : feels down [FreeTextEntry9] : see HPI

## 2019-08-08 LAB
ALBUMIN SERPL ELPH-MCNC: 4.3 G/DL
ALP BLD-CCNC: 88 U/L
ALT SERPL-CCNC: 11 U/L
ANION GAP SERPL CALC-SCNC: 15 MMOL/L
AST SERPL-CCNC: 18 U/L
BASOPHILS # BLD AUTO: 0.04 K/UL
BASOPHILS NFR BLD AUTO: 0.4 %
BILIRUB SERPL-MCNC: 0.4 MG/DL
BUN SERPL-MCNC: 8 MG/DL
CALCIUM SERPL-MCNC: 9.1 MG/DL
CHLORIDE SERPL-SCNC: 104 MMOL/L
CK SERPL-CCNC: 153 U/L
CO2 SERPL-SCNC: 22 MMOL/L
CREAT SERPL-MCNC: 0.55 MG/DL
CRP SERPL-MCNC: <0.1 MG/DL
EOSINOPHIL # BLD AUTO: 0.19 K/UL
EOSINOPHIL NFR BLD AUTO: 1.7 %
ERYTHROCYTE [SEDIMENTATION RATE] IN BLOOD BY WESTERGREN METHOD: 14 MM/HR
FERRITIN SERPL-MCNC: 30 NG/ML
GLUCOSE SERPL-MCNC: 100 MG/DL
HCT VFR BLD CALC: 40.7 %
HGB BLD-MCNC: 12.5 G/DL
IMM GRANULOCYTES NFR BLD AUTO: 0.6 %
IRON SATN MFR SERPL: 26 %
IRON SERPL-MCNC: 83 UG/DL
LYMPHOCYTES # BLD AUTO: 2.4 K/UL
LYMPHOCYTES NFR BLD AUTO: 21.7 %
MAN DIFF?: NORMAL
MCHC RBC-ENTMCNC: 25.4 PG
MCHC RBC-ENTMCNC: 30.7 GM/DL
MCV RBC AUTO: 82.6 FL
MONOCYTES # BLD AUTO: 1 K/UL
MONOCYTES NFR BLD AUTO: 9 %
NEUTROPHILS # BLD AUTO: 7.37 K/UL
NEUTROPHILS NFR BLD AUTO: 66.6 %
PLATELET # BLD AUTO: 194 K/UL
POTASSIUM SERPL-SCNC: 4 MMOL/L
PROT SERPL-MCNC: 6.9 G/DL
RBC # BLD: 4.93 M/UL
RBC # FLD: 14.3 %
SODIUM SERPL-SCNC: 141 MMOL/L
TIBC SERPL-MCNC: 324 UG/DL
UIBC SERPL-MCNC: 241 UG/DL
WBC # FLD AUTO: 11.07 K/UL

## 2019-08-12 ENCOUNTER — RX RENEWAL (OUTPATIENT)
Age: 44
End: 2019-08-12

## 2019-08-13 ENCOUNTER — RX RENEWAL (OUTPATIENT)
Age: 44
End: 2019-08-13

## 2019-08-15 ENCOUNTER — TRANSCRIPTION ENCOUNTER (OUTPATIENT)
Age: 44
End: 2019-08-15

## 2019-08-16 ENCOUNTER — TRANSCRIPTION ENCOUNTER (OUTPATIENT)
Age: 44
End: 2019-08-16

## 2019-08-19 ENCOUNTER — RX RENEWAL (OUTPATIENT)
Age: 44
End: 2019-08-19

## 2019-09-05 ENCOUNTER — APPOINTMENT (OUTPATIENT)
Dept: INTERNAL MEDICINE | Facility: CLINIC | Age: 44
End: 2019-09-05

## 2019-09-05 ENCOUNTER — OUTPATIENT (OUTPATIENT)
Dept: OUTPATIENT SERVICES | Facility: HOSPITAL | Age: 44
LOS: 1 days | End: 2019-09-05

## 2019-09-05 DIAGNOSIS — Z98.890 OTHER SPECIFIED POSTPROCEDURAL STATES: Chronic | ICD-10-CM

## 2019-09-05 DIAGNOSIS — Z98.89 OTHER SPECIFIED POSTPROCEDURAL STATES: Chronic | ICD-10-CM

## 2019-09-09 ENCOUNTER — RX RENEWAL (OUTPATIENT)
Age: 44
End: 2019-09-09

## 2019-09-22 ENCOUNTER — TRANSCRIPTION ENCOUNTER (OUTPATIENT)
Age: 44
End: 2019-09-22

## 2019-09-22 ENCOUNTER — EMERGENCY (EMERGENCY)
Facility: HOSPITAL | Age: 44
LOS: 1 days | Discharge: HOME | End: 2019-09-22
Attending: EMERGENCY MEDICINE | Admitting: EMERGENCY MEDICINE
Payer: MEDICAID

## 2019-09-22 VITALS
TEMPERATURE: 98 F | RESPIRATION RATE: 18 BRPM | SYSTOLIC BLOOD PRESSURE: 114 MMHG | OXYGEN SATURATION: 100 % | DIASTOLIC BLOOD PRESSURE: 67 MMHG | HEART RATE: 80 BPM

## 2019-09-22 DIAGNOSIS — Z98.890 OTHER SPECIFIED POSTPROCEDURAL STATES: Chronic | ICD-10-CM

## 2019-09-22 DIAGNOSIS — Z98.89 OTHER SPECIFIED POSTPROCEDURAL STATES: Chronic | ICD-10-CM

## 2019-09-22 LAB
ALBUMIN SERPL ELPH-MCNC: 5.1 G/DL — HIGH (ref 3.3–5)
ALP SERPL-CCNC: 97 U/L — SIGNIFICANT CHANGE UP (ref 40–120)
ALT FLD-CCNC: 25 U/L — SIGNIFICANT CHANGE UP (ref 4–33)
ANION GAP SERPL CALC-SCNC: 14 MMO/L — SIGNIFICANT CHANGE UP (ref 7–14)
APPEARANCE UR: CLEAR — SIGNIFICANT CHANGE UP
AST SERPL-CCNC: 29 U/L — SIGNIFICANT CHANGE UP (ref 4–32)
BACTERIA # UR AUTO: NEGATIVE — SIGNIFICANT CHANGE UP
BASE EXCESS BLDV CALC-SCNC: 3.9 MMOL/L — SIGNIFICANT CHANGE UP
BASOPHILS # BLD AUTO: 0.05 K/UL — SIGNIFICANT CHANGE UP (ref 0–0.2)
BASOPHILS NFR BLD AUTO: 0.3 % — SIGNIFICANT CHANGE UP (ref 0–2)
BILIRUB SERPL-MCNC: 0.4 MG/DL — SIGNIFICANT CHANGE UP (ref 0.2–1.2)
BILIRUB UR-MCNC: NEGATIVE — SIGNIFICANT CHANGE UP
BLOOD GAS VENOUS - CREATININE: 0.61 MG/DL — SIGNIFICANT CHANGE UP (ref 0.5–1.3)
BLOOD UR QL VISUAL: SIGNIFICANT CHANGE UP
BUN SERPL-MCNC: 12 MG/DL — SIGNIFICANT CHANGE UP (ref 7–23)
CALCIUM SERPL-MCNC: 10 MG/DL — SIGNIFICANT CHANGE UP (ref 8.4–10.5)
CHLORIDE BLDV-SCNC: 108 MMOL/L — SIGNIFICANT CHANGE UP (ref 96–108)
CHLORIDE SERPL-SCNC: 100 MMOL/L — SIGNIFICANT CHANGE UP (ref 98–107)
CO2 SERPL-SCNC: 26 MMOL/L — SIGNIFICANT CHANGE UP (ref 22–31)
COLOR SPEC: SIGNIFICANT CHANGE UP
CREAT SERPL-MCNC: 0.58 MG/DL — SIGNIFICANT CHANGE UP (ref 0.5–1.3)
EOSINOPHIL # BLD AUTO: 0.1 K/UL — SIGNIFICANT CHANGE UP (ref 0–0.5)
EOSINOPHIL NFR BLD AUTO: 0.6 % — SIGNIFICANT CHANGE UP (ref 0–6)
GAS PNL BLDV: 137 MMOL/L — SIGNIFICANT CHANGE UP (ref 136–146)
GLUCOSE BLDV-MCNC: 147 MG/DL — HIGH (ref 70–99)
GLUCOSE SERPL-MCNC: 148 MG/DL — HIGH (ref 70–99)
GLUCOSE UR-MCNC: NEGATIVE — SIGNIFICANT CHANGE UP
HCG SERPL-ACNC: < 5 MIU/ML — SIGNIFICANT CHANGE UP
HCO3 BLDV-SCNC: 26 MMOL/L — SIGNIFICANT CHANGE UP (ref 20–27)
HCT VFR BLD CALC: 43.2 % — SIGNIFICANT CHANGE UP (ref 34.5–45)
HCT VFR BLDV CALC: 43.6 % — SIGNIFICANT CHANGE UP (ref 34.5–45)
HGB BLD-MCNC: 13.5 G/DL — SIGNIFICANT CHANGE UP (ref 11.5–15.5)
HGB BLDV-MCNC: 14.2 G/DL — SIGNIFICANT CHANGE UP (ref 11.5–15.5)
HYALINE CASTS # UR AUTO: NEGATIVE — SIGNIFICANT CHANGE UP
IMM GRANULOCYTES NFR BLD AUTO: 0.6 % — SIGNIFICANT CHANGE UP (ref 0–1.5)
KETONES UR-MCNC: NEGATIVE — SIGNIFICANT CHANGE UP
LACTATE BLDV-MCNC: 2.4 MMOL/L — HIGH (ref 0.5–2)
LEUKOCYTE ESTERASE UR-ACNC: NEGATIVE — SIGNIFICANT CHANGE UP
LYMPHOCYTES # BLD AUTO: 1.94 K/UL — SIGNIFICANT CHANGE UP (ref 1–3.3)
LYMPHOCYTES # BLD AUTO: 12.5 % — LOW (ref 13–44)
MCHC RBC-ENTMCNC: 25.4 PG — LOW (ref 27–34)
MCHC RBC-ENTMCNC: 31.3 % — LOW (ref 32–36)
MCV RBC AUTO: 81.2 FL — SIGNIFICANT CHANGE UP (ref 80–100)
MONOCYTES # BLD AUTO: 0.91 K/UL — HIGH (ref 0–0.9)
MONOCYTES NFR BLD AUTO: 5.8 % — SIGNIFICANT CHANGE UP (ref 2–14)
NEUTROPHILS # BLD AUTO: 12.48 K/UL — HIGH (ref 1.8–7.4)
NEUTROPHILS NFR BLD AUTO: 80.2 % — HIGH (ref 43–77)
NITRITE UR-MCNC: NEGATIVE — SIGNIFICANT CHANGE UP
NRBC # FLD: 0 K/UL — SIGNIFICANT CHANGE UP (ref 0–0)
PCO2 BLDV: 57 MMHG — HIGH (ref 41–51)
PH BLDV: 7.33 PH — SIGNIFICANT CHANGE UP (ref 7.32–7.43)
PH UR: 7.5 — SIGNIFICANT CHANGE UP (ref 5–8)
PLATELET # BLD AUTO: 182 K/UL — SIGNIFICANT CHANGE UP (ref 150–400)
PMV BLD: 11.4 FL — SIGNIFICANT CHANGE UP (ref 7–13)
PO2 BLDV: 42 MMHG — HIGH (ref 35–40)
POTASSIUM BLDV-SCNC: 3.8 MMOL/L — SIGNIFICANT CHANGE UP (ref 3.4–4.5)
POTASSIUM SERPL-MCNC: 4.1 MMOL/L — SIGNIFICANT CHANGE UP (ref 3.5–5.3)
POTASSIUM SERPL-SCNC: 4.1 MMOL/L — SIGNIFICANT CHANGE UP (ref 3.5–5.3)
PROT SERPL-MCNC: 8.2 G/DL — SIGNIFICANT CHANGE UP (ref 6–8.3)
PROT UR-MCNC: NEGATIVE — SIGNIFICANT CHANGE UP
RBC # BLD: 5.32 M/UL — HIGH (ref 3.8–5.2)
RBC # FLD: 14.2 % — SIGNIFICANT CHANGE UP (ref 10.3–14.5)
RBC CASTS # UR COMP ASSIST: HIGH (ref 0–?)
SAO2 % BLDV: 74.5 % — SIGNIFICANT CHANGE UP (ref 60–85)
SODIUM SERPL-SCNC: 140 MMOL/L — SIGNIFICANT CHANGE UP (ref 135–145)
SP GR SPEC: 1.01 — SIGNIFICANT CHANGE UP (ref 1–1.04)
SQUAMOUS # UR AUTO: SIGNIFICANT CHANGE UP
UROBILINOGEN FLD QL: NORMAL — SIGNIFICANT CHANGE UP
WBC # BLD: 15.58 K/UL — HIGH (ref 3.8–10.5)
WBC # FLD AUTO: 15.58 K/UL — HIGH (ref 3.8–10.5)
WBC UR QL: SIGNIFICANT CHANGE UP (ref 0–?)

## 2019-09-22 PROCEDURE — 99285 EMERGENCY DEPT VISIT HI MDM: CPT

## 2019-09-22 RX ORDER — SODIUM CHLORIDE 9 MG/ML
1000 INJECTION INTRAMUSCULAR; INTRAVENOUS; SUBCUTANEOUS ONCE
Refills: 0 | Status: COMPLETED | OUTPATIENT
Start: 2019-09-22 | End: 2019-09-22

## 2019-09-22 RX ORDER — MORPHINE SULFATE 50 MG/1
2 CAPSULE, EXTENDED RELEASE ORAL ONCE
Refills: 0 | Status: DISCONTINUED | OUTPATIENT
Start: 2019-09-22 | End: 2019-09-22

## 2019-09-22 RX ADMIN — SODIUM CHLORIDE 1000 MILLILITER(S): 9 INJECTION INTRAMUSCULAR; INTRAVENOUS; SUBCUTANEOUS at 21:12

## 2019-09-22 RX ADMIN — SODIUM CHLORIDE 1000 MILLILITER(S): 9 INJECTION INTRAMUSCULAR; INTRAVENOUS; SUBCUTANEOUS at 23:00

## 2019-09-22 RX ADMIN — SODIUM CHLORIDE 1000 MILLILITER(S): 9 INJECTION INTRAMUSCULAR; INTRAVENOUS; SUBCUTANEOUS at 23:01

## 2019-09-22 RX ADMIN — MORPHINE SULFATE 2 MILLIGRAM(S): 50 CAPSULE, EXTENDED RELEASE ORAL at 21:28

## 2019-09-22 RX ADMIN — MORPHINE SULFATE 2 MILLIGRAM(S): 50 CAPSULE, EXTENDED RELEASE ORAL at 21:43

## 2019-09-22 NOTE — ED PROVIDER NOTE - PATIENT PORTAL LINK FT
You can access the FollowMyHealth Patient Portal offered by [facility] by registering at the following website: [email or web address]. By joining [employer]’s FollowMyHealth portal, you will also be able to view your health information using other applications (apps) compatible with our system.

## 2019-09-22 NOTE — ED PROVIDER NOTE - PHYSICAL EXAMINATION
Dr Berg  no acute distress, nontoxic. normal S1-S2 No resp distress. able to speak in full and clear sentences. no wheeze, rales or stridor. soft nondistended, no rebound or guarding. +right cvat, neg murphys Dr Berg  no acute distress, nontoxic. normal S1-S2 No resp distress. able to speak in full and clear sentences. no wheeze, rales or stridor. soft nondistended, no rebound or guarding. +right cvat, neg murphys    Armando Dc PGY1   PHYSICAL EXAM:  GENERAL: non-toxic appearing; in no respiratory distress  HEAD: Atraumatic, Normocephalic; no turner's sign, no periorbital ecchymosis   EYES: PERRL, EOMs intact b/l w/out deficits  ENMT: Moist membranes, no anterior/posterior, or supraclavicular LAD  CHEST/LUNG: CTAB no wheezes/rhonchi/rales  HEART: RRR no murmur/gallops/rubs  ABDOMEN: +BS, soft, NT, ND; +R costovertebral angle tenderness, R>L  EXTREMITIES: No LE edema, +2 radial pulses b/l  MUSCULOSKELETAL: FROM of all 4 extremities;  NERVOUS SYSTEM:  A&Ox3, No motor deficits or sensory deficits; CNII-XII intact; no focal neurologic deficits  Heme/LYMPH: No ecchymosis or bruising or LAD  SKIN:  No new rashes

## 2019-09-22 NOTE — ED PROVIDER NOTE - CLINICAL SUMMARY MEDICAL DECISION MAKING FREE TEXT BOX
plan urine hcg, labs, urine, IVF, pain med, ct abdomen stone hunt , re assess plan urine hcg, labs, urine, IVF, pain med, ct abdomen stone hunt , re assess    Armando Dc PGY1 - 42 yo F PMhx scleroderma, hypothyroid, presents to ED c/o R flank pain radiating to RLQ this morning. one episode of emesis of food content. denies dysuria, fever, vaginal bleeding. VSS. AFebrile. Exam shows R CVAT > L. Abd soft NT. DDx includes stone vs UTI vs pyelo. Will obtain ua, cbc, cmp, hydrate w/ IVF and obtain CT. Will reassess

## 2019-09-22 NOTE — ED PROVIDER NOTE - OBJECTIVE STATEMENT
Dr Berg  42yo F hx of scleroderma on cellcept, hypothyroidism from home co right flank pain today since this morning, Constant pain radiates from the  right flank to abdomen. Associated with nausea and episode of NBNB vomit while at urgent care. no dysuria no hematuria no frequency or urgency. no fever/chills. Told by  that "blood in  urine" LMP 9-8-19 denies preg. no cp no cough no sob no travel. Took an alleve this am w min relief. Had a similar episode 9months ago, states "it was gas" no hx of kidney stone. Dr Berg  42yo F hx of scleroderma on cellcept, hypothyroidism from home co right flank pain today since this morning, Constant pain radiates from the  right flank to abdomen. Associated with nausea and episode of NBNB vomit while at urgent care. no dysuria no hematuria no frequency or urgency. no fever/chills. Told by  that "blood in  urine" LMP 9-8-19 denies preg. no cp no cough no sob no travel. Took an alleve this am w min relief. Had a similar episode 9months ago, states "it was gas" no hx of kidney stone.    Armando Dc PGY1 44 y/o F PMHx scleroderma on cellcept, hypothyroid, presents to ED c/o intermittent R flank pain radiating to RLQ abd since this morning. It acutely worsened about 3 hours PTA. Pt had one episode of n/v of food contents. Denies alleviating or relieving factors of her pain. Denies dysuria, urinary freq/urgency, vaginal bleeding, diarrhea, cp, sob, fever, chills, headache. LMP 9 Dr Berg  42yo F hx of scleroderma on cellcept, hypothyroidism from home co right flank pain today since this morning, Constant pain radiates from the  right flank to abdomen. Associated with nausea and episode of NBNB vomit while at urgent care. no dysuria no hematuria no frequency or urgency. no fever/chills. Told by  that "blood in  urine" LMP 9-8-19 denies preg. no cp no cough no sob no travel. Took an alleve this am w min relief. Had a similar episode 9months ago, states "it was gas" no hx of kidney stone.    Armando Dc PGY1 44 y/o F PMHx scleroderma on cellcept, hypothyroid, presents to ED c/o intermittent R flank pain radiating to RLQ abd since this morning. It acutely worsened about 3 hours PTA. Pt had one episode of n/v of food contents. Denies alleviating or relieving factors of her pain. Denies dysuria, urinary freq/urgency, vaginal bleeding, diarrhea, cp, sob, fever, chills, headache. LMP 9/8/19. Denies h/o kidney stone but had a similar episode of this pain about 9 months ago.

## 2019-09-22 NOTE — ED PROVIDER NOTE - PROGRESS NOTE DETAILS
pt more comfortable. pain controlled. pt ambulatory in ED, looks more comfortable. pain controlled. no vomit in ED> lactic improved. pending ct to be done. no abx given clean ua and no urinary complaints. PT endorsed to Dr Cabot. Cabot: Pt found to have a 4mm proximal ureteral stone.  Patient is now asx.  UA is not concerning for infection.  Will discharge patient home with motrin, flomax, and percocet.  She understands to come back immediately if she develops a fever.

## 2019-09-22 NOTE — ED ADULT NURSE NOTE - OBJECTIVE STATEMENT
Pt rcvd to 28 c/o R Flank pain radiating to Rt lower abdomen since this AM waking up w/ nausea, 1 episode nbnb emesis.  Went to [facility] care, dipped urine and reported + blood in urine, but pt has not visibly seen blood, rpts subj chills, no dysuria, diarrhea/constipation.  Pt aaox4, vitally stable, ambulatory, afebrile.  PMHx Scleroderma, Hypothyroid, on cellcept, plaquinil, nifedipine, synthroid.  IV placed to R AC #20g, labs drawn/sent per orders.  Pt instructed for clean catch ua, IVF NS Bolus initiated per MD orders, pending completed ED MD exam and pain rx.  Will CTM.

## 2019-09-22 NOTE — ED PROVIDER NOTE - NS ED ROS FT
Armando Dc PGY1   Constitutional: no fevers or chills  HEENT: no visual changes, no sore throat, no rhinorrhea  CV: no cp or palpitations  Resp: no sob or cough  GI:  R flank pain; RLQ abd pain, no nausea, no vomiting, no diarrhea, no constipation  : no dysuria, no hematuria  MSK: no myalgais or arthralgias  skin: no rashes  neuro: no HA, no confusion; no numbness; no weakness, no tingling  psych: no SI/HI  heme: no LAD

## 2019-09-22 NOTE — ED ADULT TRIAGE NOTE - CHIEF COMPLAINT QUOTE
RIGHT FLANK PAIN    sent from urgent care for right flank pain since am... no dysuria but nauseated, vomited x1.  pt has scleroderma- on cellcept.

## 2019-09-23 VITALS
DIASTOLIC BLOOD PRESSURE: 77 MMHG | TEMPERATURE: 98 F | SYSTOLIC BLOOD PRESSURE: 102 MMHG | HEART RATE: 90 BPM | RESPIRATION RATE: 14 BRPM | OXYGEN SATURATION: 100 %

## 2019-09-23 LAB — LACTATE BLDV-MCNC: 1.5 MMOL/L — SIGNIFICANT CHANGE UP (ref 0.5–2)

## 2019-09-23 PROCEDURE — 74176 CT ABD & PELVIS W/O CONTRAST: CPT | Mod: 26

## 2019-09-23 RX ORDER — IBUPROFEN 200 MG
1 TABLET ORAL
Qty: 30 | Refills: 0
Start: 2019-09-23

## 2019-09-23 RX ORDER — TAMSULOSIN HYDROCHLORIDE 0.4 MG/1
0.4 CAPSULE ORAL ONCE
Refills: 0 | Status: COMPLETED | OUTPATIENT
Start: 2019-09-23 | End: 2019-09-23

## 2019-09-23 RX ORDER — TAMSULOSIN HYDROCHLORIDE 0.4 MG/1
1 CAPSULE ORAL
Qty: 14 | Refills: 0
Start: 2019-09-23 | End: 2019-10-06

## 2019-09-23 RX ADMIN — TAMSULOSIN HYDROCHLORIDE 0.4 MILLIGRAM(S): 0.4 CAPSULE ORAL at 02:36

## 2019-09-23 RX ADMIN — SODIUM CHLORIDE 1000 MILLILITER(S): 9 INJECTION INTRAMUSCULAR; INTRAVENOUS; SUBCUTANEOUS at 01:30

## 2019-09-23 NOTE — ED ADULT NURSE REASSESSMENT NOTE - NS ED NURSE REASSESS COMMENT FT1
Pt reports improvement to her R Flank pain states is gone now, at times will have episodic pain but rates as 4/10 and improved from previous.  Pt denies nausea, vomiting, fevers, chills, abd pain or other complaints at this time.  ED MD Dc @ bedside to discuss w/ patient & spouse results, outpatient follow up and medications as ordered.  Pt medicated per MAR, pending dispo/dc.  Pt in no acute distress, will CTM

## 2019-09-23 NOTE — DISCUSSION/SUMMARY
[ED Visit] : a visit to ED [FreeTextEntry1] : 43-year-old female with scleroderma, hypothyroidism, interstitial lung disease and right winged scapula who presented to [facility] w/ nausea, right flank pain, and hematuria. Found to have right 4 mm obstructing proximal ureteral stone. Improved w/ fluids and pain control. Called and spoke to patient, pain is well controlled, no nausea/vomiting. Will task  to make patient follow up appointment with Dr. Hermosillo. \par \par Neli Aguilar MD\par PGY-3, [employer] [facility]

## 2019-09-24 ENCOUNTER — APPOINTMENT (OUTPATIENT)
Dept: ULTRASOUND IMAGING | Facility: CLINIC | Age: 44
End: 2019-09-24

## 2019-09-24 ENCOUNTER — APPOINTMENT (OUTPATIENT)
Dept: MAMMOGRAPHY | Facility: CLINIC | Age: 44
End: 2019-09-24

## 2019-09-24 LAB
BACTERIA UR CULT: SIGNIFICANT CHANGE UP
SPECIMEN SOURCE: SIGNIFICANT CHANGE UP

## 2019-09-25 NOTE — ED POST DISCHARGE NOTE - RESULT SUMMARY
culture grew 3 or more types of organisms  which indicate collection contamination, consider recollection only if clinically indicated. UA; negative for leuk estrase or nitrate. Patient told to follow up with Urologist in ED.

## 2019-09-26 ENCOUNTER — APPOINTMENT (OUTPATIENT)
Dept: INTERNAL MEDICINE | Facility: CLINIC | Age: 44
End: 2019-09-26

## 2019-10-02 ENCOUNTER — OUTPATIENT (OUTPATIENT)
Dept: OUTPATIENT SERVICES | Facility: HOSPITAL | Age: 44
LOS: 1 days | End: 2019-10-02

## 2019-10-02 ENCOUNTER — LABORATORY RESULT (OUTPATIENT)
Age: 44
End: 2019-10-02

## 2019-10-02 ENCOUNTER — APPOINTMENT (OUTPATIENT)
Dept: INTERNAL MEDICINE | Facility: CLINIC | Age: 44
End: 2019-10-02
Payer: MEDICAID

## 2019-10-02 VITALS
HEART RATE: 86 BPM | DIASTOLIC BLOOD PRESSURE: 70 MMHG | WEIGHT: 115 LBS | SYSTOLIC BLOOD PRESSURE: 116 MMHG | BODY MASS INDEX: 21.71 KG/M2 | HEIGHT: 61 IN

## 2019-10-02 DIAGNOSIS — Z98.890 OTHER SPECIFIED POSTPROCEDURAL STATES: Chronic | ICD-10-CM

## 2019-10-02 DIAGNOSIS — Z98.89 OTHER SPECIFIED POSTPROCEDURAL STATES: Chronic | ICD-10-CM

## 2019-10-02 LAB
T4 FREE SERPL-MCNC: 1.88 NG/DL — HIGH (ref 0.9–1.8)
TSH SERPL-MCNC: 0.28 UIU/ML — SIGNIFICANT CHANGE UP (ref 0.27–4.2)

## 2019-10-02 PROCEDURE — 99213 OFFICE O/P EST LOW 20 MIN: CPT | Mod: 25

## 2019-10-02 RX ORDER — LEVOTHYROXINE SODIUM 0.07 MG/1
75 TABLET ORAL
Qty: 90 | Refills: 1 | Status: DISCONTINUED | COMMUNITY
Start: 2019-06-28 | End: 2019-10-02

## 2019-10-03 ENCOUNTER — TRANSCRIPTION ENCOUNTER (OUTPATIENT)
Age: 44
End: 2019-10-03

## 2019-10-03 DIAGNOSIS — F32.0 MAJOR DEPRESSIVE DISORDER, SINGLE EPISODE, MILD: ICD-10-CM

## 2019-10-03 DIAGNOSIS — Z23 ENCOUNTER FOR IMMUNIZATION: ICD-10-CM

## 2019-10-03 DIAGNOSIS — E03.9 HYPOTHYROIDISM, UNSPECIFIED: ICD-10-CM

## 2019-10-03 DIAGNOSIS — N20.0 CALCULUS OF KIDNEY: ICD-10-CM

## 2019-10-03 NOTE — HISTORY OF PRESENT ILLNESS
[FreeTextEntry1] : Following recent emergency room visit for kidney stone [de-identified] : Ms. NICOLAS LAL is a 43-year-old female with scleroderma, hypothyroidism, interstitial lung disease and right winged scapula who presents after recent emergency room visit for kidney stones.  Patient states she was experiencing severe right-sided back pain that prompted her to visit the emergency room.  She was found to have kidney stone on CT imaging.  She was sent home on Flomax and pain medications with instructions to schedule follow-up with urology.  She has yet to call urology but intends to do so soon and is concerned about her upcoming trip to [facility] and the risk for recurrent stone while she is there.\par \par Since her visit she continued to have some pain for a few days but now has been without pain for a week.  She has 1 more dose of her Flomax.  She reports never having a kidney stone previously.  She also states that she was not given a strainer to catch the stone if she passed it.\par \par She has concerns about her upcoming visit with the travel clinic, and the sense of expense regarding the vaccinations.  She wonders what I do consequences if she does not wish to pay for the vaccines out-of-pocket.

## 2019-10-03 NOTE — PHYSICAL EXAM
[No Acute Distress] : no acute distress [Well Nourished] : well nourished [Normal Voice/Communication] : normal voice/communication [Soft] : abdomen soft [Non Tender] : non-tender [No CVA Tenderness] : no CVA  tenderness

## 2019-10-03 NOTE — HISTORY OF PRESENT ILLNESS
[FreeTextEntry1] : Following recent emergency room visit for kidney stone [de-identified] : Ms. NICOLAS LAL is a 43-year-old female with scleroderma, hypothyroidism, interstitial lung disease and right winged scapula who presents after recent emergency room visit for kidney stones.  Patient states she was experiencing severe right-sided back pain that prompted her to visit the emergency room.  She was found to have kidney stone on CT imaging.  She was sent home on Flomax and pain medications with instructions to schedule follow-up with urology.  She has yet to call urology but intends to do so soon and is concerned about her upcoming trip to [facility] and the risk for recurrent stone while she is there.\par \par Since her visit she continued to have some pain for a few days but now has been without pain for a week.  She has 1 more dose of her Flomax.  She reports never having a kidney stone previously.  She also states that she was not given a strainer to catch the stone if she passed it.\par \par She has concerns about her upcoming visit with the travel clinic, and the sense of expense regarding the vaccinations.  She wonders what I do consequences if she does not wish to pay for the vaccines out-of-pocket.

## 2019-10-03 NOTE — REVIEW OF SYSTEMS
[Fever] : no fever [Chills] : no chills [Abdominal Pain] : no abdominal pain [Nausea] : no nausea [Vomiting] : no vomiting [Dysuria] : no dysuria [Hematuria] : no hematuria [Depression] : depression [FreeTextEntry8] : Right flank pain now resolved

## 2019-10-03 NOTE — REVIEW OF SYSTEMS
[Chills] : no chills [Fever] : no fever [Abdominal Pain] : no abdominal pain [Nausea] : no nausea [Vomiting] : no vomiting [Dysuria] : no dysuria [Hematuria] : no hematuria [Depression] : depression [FreeTextEntry8] : Right flank pain now resolved

## 2019-10-07 ENCOUNTER — APPOINTMENT (OUTPATIENT)
Dept: INFECTIOUS DISEASE | Facility: CLINIC | Age: 44
End: 2019-10-07
Payer: SELF-PAY

## 2019-10-07 ENCOUNTER — APPOINTMENT (OUTPATIENT)
Dept: INFECTIOUS DISEASE | Facility: CLINIC | Age: 44
End: 2019-10-07

## 2019-10-07 PROCEDURE — 90691 TYPHOID VACCINE IM: CPT

## 2019-10-07 PROCEDURE — 90471 IMMUNIZATION ADMIN: CPT | Mod: NC

## 2019-10-07 PROCEDURE — 99401 PREV MED CNSL INDIV APPRX 15: CPT | Mod: 25

## 2019-10-08 ENCOUNTER — APPOINTMENT (OUTPATIENT)
Dept: RHEUMATOLOGY | Facility: CLINIC | Age: 44
End: 2019-10-08
Payer: MEDICAID

## 2019-10-08 VITALS
HEART RATE: 76 BPM | WEIGHT: 114 LBS | BODY MASS INDEX: 21.52 KG/M2 | HEIGHT: 61 IN | TEMPERATURE: 97.9 F | OXYGEN SATURATION: 98 % | SYSTOLIC BLOOD PRESSURE: 114 MMHG | DIASTOLIC BLOOD PRESSURE: 81 MMHG

## 2019-10-08 PROCEDURE — 99214 OFFICE O/P EST MOD 30 MIN: CPT

## 2019-10-09 ENCOUNTER — FORM ENCOUNTER (OUTPATIENT)
Age: 44
End: 2019-10-09

## 2019-10-10 ENCOUNTER — OUTPATIENT (OUTPATIENT)
Dept: OUTPATIENT SERVICES | Facility: HOSPITAL | Age: 44
LOS: 1 days | End: 2019-10-10
Payer: MEDICAID

## 2019-10-10 ENCOUNTER — APPOINTMENT (OUTPATIENT)
Dept: ULTRASOUND IMAGING | Facility: CLINIC | Age: 44
End: 2019-10-10
Payer: MEDICAID

## 2019-10-10 ENCOUNTER — EMERGENCY (EMERGENCY)
Facility: HOSPITAL | Age: 44
LOS: 1 days | Discharge: HOME | End: 2019-10-10
Attending: EMERGENCY MEDICINE | Admitting: EMERGENCY MEDICINE
Payer: MEDICAID

## 2019-10-10 VITALS
DIASTOLIC BLOOD PRESSURE: 67 MMHG | TEMPERATURE: 98 F | SYSTOLIC BLOOD PRESSURE: 103 MMHG | HEART RATE: 93 BPM | OXYGEN SATURATION: 100 % | RESPIRATION RATE: 18 BRPM

## 2019-10-10 DIAGNOSIS — Z98.890 OTHER SPECIFIED POSTPROCEDURAL STATES: Chronic | ICD-10-CM

## 2019-10-10 DIAGNOSIS — Z98.89 OTHER SPECIFIED POSTPROCEDURAL STATES: Chronic | ICD-10-CM

## 2019-10-10 DIAGNOSIS — Z00.8 ENCOUNTER FOR OTHER GENERAL EXAMINATION: ICD-10-CM

## 2019-10-10 LAB
ALBUMIN SERPL ELPH-MCNC: 4.2 G/DL — SIGNIFICANT CHANGE UP (ref 3.3–5)
ALP SERPL-CCNC: 79 U/L — SIGNIFICANT CHANGE UP (ref 40–120)
ALT FLD-CCNC: 13 U/L — SIGNIFICANT CHANGE UP (ref 4–33)
ANION GAP SERPL CALC-SCNC: 12 MMO/L — SIGNIFICANT CHANGE UP (ref 7–14)
APPEARANCE UR: CLEAR — SIGNIFICANT CHANGE UP
APPEARANCE: CLEAR
AST SERPL-CCNC: 25 U/L — SIGNIFICANT CHANGE UP (ref 4–32)
BACTERIA # UR AUTO: NEGATIVE — SIGNIFICANT CHANGE UP
BACTERIA: NEGATIVE
BASE EXCESS BLDV CALC-SCNC: 1.9 MMOL/L — SIGNIFICANT CHANGE UP
BASOPHILS # BLD AUTO: 0.04 K/UL
BASOPHILS # BLD AUTO: 0.04 K/UL — SIGNIFICANT CHANGE UP (ref 0–0.2)
BASOPHILS NFR BLD AUTO: 0.4 % — SIGNIFICANT CHANGE UP (ref 0–2)
BASOPHILS NFR BLD AUTO: 0.5 %
BILIRUB SERPL-MCNC: 0.5 MG/DL — SIGNIFICANT CHANGE UP (ref 0.2–1.2)
BILIRUB UR-MCNC: NEGATIVE — SIGNIFICANT CHANGE UP
BILIRUBIN URINE: NEGATIVE
BLOOD GAS VENOUS - CREATININE: 0.52 MG/DL — SIGNIFICANT CHANGE UP (ref 0.5–1.3)
BLOOD GAS VENOUS - FIO2: 21 — SIGNIFICANT CHANGE UP
BLOOD UR QL VISUAL: SIGNIFICANT CHANGE UP
BLOOD URINE: NEGATIVE
BUN SERPL-MCNC: 9 MG/DL — SIGNIFICANT CHANGE UP (ref 7–23)
CALCIUM SERPL-MCNC: 9.3 MG/DL — SIGNIFICANT CHANGE UP (ref 8.4–10.5)
CHLORIDE BLDV-SCNC: 105 MMOL/L — SIGNIFICANT CHANGE UP (ref 96–108)
CHLORIDE SERPL-SCNC: 102 MMOL/L — SIGNIFICANT CHANGE UP (ref 98–107)
CO2 SERPL-SCNC: 23 MMOL/L — SIGNIFICANT CHANGE UP (ref 22–31)
COLOR SPEC: COLORLESS — SIGNIFICANT CHANGE UP
COLOR: COLORLESS
CREAT SERPL-MCNC: 0.53 MG/DL — SIGNIFICANT CHANGE UP (ref 0.5–1.3)
CRP SERPL-MCNC: <0.1 MG/DL
EOSINOPHIL # BLD AUTO: 0.24 K/UL
EOSINOPHIL # BLD AUTO: 0.24 K/UL — SIGNIFICANT CHANGE UP (ref 0–0.5)
EOSINOPHIL NFR BLD AUTO: 2.3 % — SIGNIFICANT CHANGE UP (ref 0–6)
EOSINOPHIL NFR BLD AUTO: 3.2 %
GAS PNL BLDV: 137 MMOL/L — SIGNIFICANT CHANGE UP (ref 136–146)
GLUCOSE BLDV-MCNC: 75 MG/DL — SIGNIFICANT CHANGE UP (ref 70–99)
GLUCOSE QUALITATIVE U: NEGATIVE
GLUCOSE SERPL-MCNC: 76 MG/DL — SIGNIFICANT CHANGE UP (ref 70–99)
GLUCOSE UR-MCNC: NEGATIVE — SIGNIFICANT CHANGE UP
HCO3 BLDV-SCNC: 24 MMOL/L — SIGNIFICANT CHANGE UP (ref 20–27)
HCT VFR BLD CALC: 37.2 % — SIGNIFICANT CHANGE UP (ref 34.5–45)
HCT VFR BLD CALC: 41.4 %
HCT VFR BLDV CALC: 37.5 % — SIGNIFICANT CHANGE UP (ref 34.5–45)
HEPATITIS A IGG ANTIBODY: NONREACTIVE
HGB BLD-MCNC: 11.7 G/DL — SIGNIFICANT CHANGE UP (ref 11.5–15.5)
HGB BLD-MCNC: 12.7 G/DL
HGB BLDV-MCNC: 12.2 G/DL — SIGNIFICANT CHANGE UP (ref 11.5–15.5)
HYALINE CASTS # UR AUTO: NEGATIVE — SIGNIFICANT CHANGE UP
HYALINE CASTS: 1 /LPF
IMM GRANULOCYTES NFR BLD AUTO: 0.3 % — SIGNIFICANT CHANGE UP (ref 0–1.5)
IMM GRANULOCYTES NFR BLD AUTO: 0.4 %
KETONES UR-MCNC: NEGATIVE — SIGNIFICANT CHANGE UP
KETONES URINE: NEGATIVE
LACTATE BLDV-MCNC: 1.2 MMOL/L — SIGNIFICANT CHANGE UP (ref 0.5–2)
LEUKOCYTE ESTERASE UR-ACNC: NEGATIVE — SIGNIFICANT CHANGE UP
LEUKOCYTE ESTERASE URINE: NEGATIVE
LYMPHOCYTES # BLD AUTO: 2.59 K/UL
LYMPHOCYTES # BLD AUTO: 2.72 K/UL — SIGNIFICANT CHANGE UP (ref 1–3.3)
LYMPHOCYTES # BLD AUTO: 25.8 % — SIGNIFICANT CHANGE UP (ref 13–44)
LYMPHOCYTES NFR BLD AUTO: 34.3 %
MAN DIFF?: NORMAL
MCHC RBC-ENTMCNC: 25.3 PG — LOW (ref 27–34)
MCHC RBC-ENTMCNC: 25.5 PG
MCHC RBC-ENTMCNC: 30.7 GM/DL
MCHC RBC-ENTMCNC: 31.5 % — LOW (ref 32–36)
MCV RBC AUTO: 80.3 FL — SIGNIFICANT CHANGE UP (ref 80–100)
MCV RBC AUTO: 83.1 FL
MICROSCOPIC-UA: NORMAL
MONOCYTES # BLD AUTO: 0.88 K/UL
MONOCYTES # BLD AUTO: 0.92 K/UL — HIGH (ref 0–0.9)
MONOCYTES NFR BLD AUTO: 11.6 %
MONOCYTES NFR BLD AUTO: 8.7 % — SIGNIFICANT CHANGE UP (ref 2–14)
NEUTROPHILS # BLD AUTO: 3.78 K/UL
NEUTROPHILS # BLD AUTO: 6.61 K/UL — SIGNIFICANT CHANGE UP (ref 1.8–7.4)
NEUTROPHILS NFR BLD AUTO: 50 %
NEUTROPHILS NFR BLD AUTO: 62.5 % — SIGNIFICANT CHANGE UP (ref 43–77)
NITRITE UR-MCNC: NEGATIVE — SIGNIFICANT CHANGE UP
NITRITE URINE: NEGATIVE
NRBC # FLD: 0 K/UL — SIGNIFICANT CHANGE UP (ref 0–0)
PCO2 BLDV: 50 MMHG — SIGNIFICANT CHANGE UP (ref 41–51)
PH BLDV: 7.35 PH — SIGNIFICANT CHANGE UP (ref 7.32–7.43)
PH UR: 6 — SIGNIFICANT CHANGE UP (ref 5–8)
PH URINE: 6
PLATELET # BLD AUTO: 187 K/UL — SIGNIFICANT CHANGE UP (ref 150–400)
PLATELET # BLD AUTO: 217 K/UL
PMV BLD: 11.8 FL — SIGNIFICANT CHANGE UP (ref 7–13)
PO2 BLDV: 25 MMHG — LOW (ref 35–40)
POTASSIUM BLDV-SCNC: 4.1 MMOL/L — SIGNIFICANT CHANGE UP (ref 3.4–4.5)
POTASSIUM SERPL-MCNC: 4.4 MMOL/L — SIGNIFICANT CHANGE UP (ref 3.5–5.3)
POTASSIUM SERPL-SCNC: 4.4 MMOL/L — SIGNIFICANT CHANGE UP (ref 3.5–5.3)
PROT SERPL-MCNC: 7.2 G/DL — SIGNIFICANT CHANGE UP (ref 6–8.3)
PROT UR-MCNC: NEGATIVE — SIGNIFICANT CHANGE UP
PROTEIN URINE: NEGATIVE
RBC # BLD: 4.63 M/UL — SIGNIFICANT CHANGE UP (ref 3.8–5.2)
RBC # BLD: 4.98 M/UL
RBC # FLD: 14.1 % — SIGNIFICANT CHANGE UP (ref 10.3–14.5)
RBC # FLD: 14.5 %
RBC CASTS # UR COMP ASSIST: SIGNIFICANT CHANGE UP (ref 0–?)
RED BLOOD CELLS URINE: 1 /HPF
SAO2 % BLDV: 42 % — LOW (ref 60–85)
SODIUM SERPL-SCNC: 137 MMOL/L — SIGNIFICANT CHANGE UP (ref 135–145)
SP GR SPEC: 1.01 — SIGNIFICANT CHANGE UP (ref 1–1.04)
SPECIFIC GRAVITY URINE: 1.01
SQUAMOUS # UR AUTO: SIGNIFICANT CHANGE UP
SQUAMOUS EPITHELIAL CELLS: 3 /HPF
UROBILINOGEN FLD QL: NORMAL — SIGNIFICANT CHANGE UP
UROBILINOGEN URINE: NORMAL
WBC # BLD: 10.56 K/UL — HIGH (ref 3.8–10.5)
WBC # FLD AUTO: 10.56 K/UL — HIGH (ref 3.8–10.5)
WBC # FLD AUTO: 7.56 K/UL
WBC UR QL: SIGNIFICANT CHANGE UP (ref 0–?)
WHITE BLOOD CELLS URINE: 1 /HPF

## 2019-10-10 PROCEDURE — 76775 US EXAM ABDO BACK WALL LIM: CPT

## 2019-10-10 PROCEDURE — 74176 CT ABD & PELVIS W/O CONTRAST: CPT | Mod: 26

## 2019-10-10 PROCEDURE — 99284 EMERGENCY DEPT VISIT MOD MDM: CPT

## 2019-10-10 PROCEDURE — 76775 US EXAM ABDO BACK WALL LIM: CPT | Mod: 26

## 2019-10-10 RX ORDER — SODIUM CHLORIDE 9 MG/ML
1000 INJECTION INTRAMUSCULAR; INTRAVENOUS; SUBCUTANEOUS ONCE
Refills: 0 | Status: COMPLETED | OUTPATIENT
Start: 2019-10-10 | End: 2019-10-10

## 2019-10-10 RX ORDER — ONDANSETRON 8 MG/1
4 TABLET, FILM COATED ORAL ONCE
Refills: 0 | Status: COMPLETED | OUTPATIENT
Start: 2019-10-10 | End: 2019-10-10

## 2019-10-10 RX ORDER — ACETAMINOPHEN 500 MG
975 TABLET ORAL ONCE
Refills: 0 | Status: COMPLETED | OUTPATIENT
Start: 2019-10-10 | End: 2019-10-10

## 2019-10-10 RX ADMIN — ONDANSETRON 4 MILLIGRAM(S): 8 TABLET, FILM COATED ORAL at 17:13

## 2019-10-10 RX ADMIN — Medication 975 MILLIGRAM(S): at 17:13

## 2019-10-10 RX ADMIN — SODIUM CHLORIDE 1000 MILLILITER(S): 9 INJECTION INTRAMUSCULAR; INTRAVENOUS; SUBCUTANEOUS at 17:13

## 2019-10-10 NOTE — ED PROVIDER NOTE - NSFOLLOWUPINSTRUCTIONS_ED_ALL_ED_FT
Thank you for visiting our Emergency Department, it has been a pleasure taking part in your healthcare.    You had a thorough evaluation including an exam, labs and imaging. You were given medications for comfort. Please read the attached information sheets as they will provide useful information regarding your condition.    Your discharge diagnosis is: urinary stone, distal ureter  Please take all discharge medications as indicated below:   Return precautions to the Emergency Department include but are not limited to: fever, worsening pain despite medications, persistent nausea/vomiting (can't keep water down at all), shortness of breath, chest or abdominal pain, syncope, blood in urine,  or any other concerning symptoms.    1) Follow up with your primary care doctor within 48 hours. Please call [phone] to make an appointment or with any questions you may have.  or call [phone] to make an appointment with the clinic  2) You should also establish care with Urology by calling  [phone] or [phone] to find a doctor affiliated with [facility] in your neighborhood & network.   3) Take 400-600mg Motrin (also called Advil or Mortrin) every 6 hours as needed for fever/pain/discomfort. You can take this with Tylenol 650-1000 mg (also called acetaminophen) every 6 hours,  which can be taken 3-4 hours apart from each other to have a layered effect.  Don't use more than 4000mg of Tylenol in any 24-hour period. Make sure your other prescription/over-the-counter medications don't contain any Tylenol so you don't take too much. Please do not take these medications if you do no have pain or if you have any history of bleeding disorders, kidney or liver disease. Do not use ibuprofen if you are on blood thinners (anti-coagulation).  4) Drink at least 2 Liters or 64 Ounces of water each day.

## 2019-10-10 NOTE — ED PROVIDER NOTE - PROGRESS NOTE DETAILS
PGY2/MD Zaynab. No infection in ua, 4 mm stone in distal ureter, has appointment with urology on 10/16, strict precaution has been give, pain managmenet, plan discussed with Dr. Cannon, I have given the pt strict return and follow up precautions. The patient has been provided with a copy of all pertinent results. The patient has been informed of all concerning signs and symptoms to return to Emergency Department, the necessity to follow up with PMD/Clinic/follow up provided within 2-3 days was explained, and the patient reports understanding of above with capacity and insight. PGY2/MD Zaynab. No infection in ua, 4 mm stone in distal ureter, has appointment with urology on 10/16, strict precaution has been give, pain management, plan discussed with Dr. Cannon, I have given the pt strict return and follow up precautions. The patient has been provided with a copy of all pertinent results. The patient has been informed of all concerning signs and symptoms to return to Emergency Department, the necessity to follow up with PMD/Clinic/follow up provided within 2-3 days was explained, and the patient reports understanding of above with capacity and insight.

## 2019-10-10 NOTE — ED PROVIDER NOTE - PATIENT PORTAL LINK FT
You can access the FollowMyHealth Patient Portal offered by [facility] by registering at the following website: [email or web address]. By joining [employer]’s FollowMyHealth portal, you will also be able to view your health information using other applications (apps) compatible with our system.

## 2019-10-10 NOTE — ED PROVIDER NOTE - CLINICAL SUMMARY MEDICAL DECISION MAKING FREE TEXT BOX
PGY2/MD Zaynab. 44 yo F, recent right ureter stone, p/w recurrent hematuria, rlq pain, no fever. likely has retained stone, will r/o infectious stone, ua, cbc, cmp, ct to evaluate stone.

## 2019-10-10 NOTE — ED PROVIDER NOTE - OBJECTIVE STATEMENT
PGY2/MD Zaynab. 44 yo F with scleroderma on cellcept, hydroquinidine, h/o right proximal ureter sone (4mm, 9/22), p/r hematuria and right lower adb pain. returned hematuria, this morning, accompanied by some nauseous. No fever or vomit. appointment with Urology on 10/16 but not seen the urology yet. Pt received ultrasound today, told to have hydro?. No chest pain or sob. PGY2/MD Zaynab. 42 yo F with scleroderma on cellcept, hydroquinidine, h/o right proximal ureter sone (4mm, 9/22), p/r hematuria and right lower adb pain. returned hematuria, this morning, accompanied by some nauseous. No fever or vomit. appointment with Urology on 10/16 but not seen the urology yet. Pt received ultrasound today, told to have hydro?. No chest pain or sob.    Attending/Jone: 42 yo F w/ h/o scleroderma on Cellcept and Hydroquinidine, recent diagnosis of Rt ureter stone (~4 mm) p/w RLQ/flank pain as well as hematuria. Denies fever/chills, n/v, weakness/lightheadedness.

## 2019-10-10 NOTE — ED ADULT TRIAGE NOTE - CHIEF COMPLAINT QUOTE
Pt with right flank pain radiating to RLQ area. since this am. pt denies hematuria. Pt took oral morphine at 1230. Pain now 4/4. pt with HX of kidney stones in the past.

## 2019-10-10 NOTE — ED PROVIDER NOTE - PHYSICAL EXAMINATION
PGY2/MD Zaynab.   VITALS: reviewed  GEN: No apparent distress, A & O x 4  HEAD/EYES: NC/AT, PERRL, EOMI, anicteric sclerae, no conjunctival pallor  ENT: mucus membranes moist, oropharynx WNL, trachea midline, no JVD, neck is supple  RESP: lungs CTA with equal breath sounds bilaterally, chest wall nontender and atraumatic  CV: heart with reg rhythm S1, S2, no murmur  ABDOMEN: normoactive bowel sounds, soft, nondistended, nontender  : + right CVAT  MSK: extremities atraumatic and nontender, no edema, no asymmetry.  SKIN: warm, dry, no rash, no bruising, no cyanosis. color appropriate for ethnicity  NEURO: alert, mentating appropriately, no facial asymmetry.  PSYCH: Affect appropriate PGY2/MD Zaynab.   VITALS: reviewed  GEN: No apparent distress, A & O x 4  HEAD/EYES: NC/AT, PERRL, EOMI, anicteric sclerae, no conjunctival pallor  ENT: mucus membranes moist, oropharynx WNL, trachea midline, no JVD, neck is supple  RESP: lungs CTA with equal breath sounds bilaterally, chest wall nontender and atraumatic  CV: heart with reg rhythm S1, S2, no murmur  ABDOMEN: normoactive bowel sounds, soft, nondistended, nontender  : + right CVAT  MSK: extremities atraumatic and nontender, no edema, no asymmetry.  SKIN: warm, dry, no rash, no bruising, no cyanosis. color appropriate for ethnicity  NEURO: alert, mentating appropriately, no facial asymmetry.  PSYCH: Affect appropriate    Attending/Jone: NAD; PERRL/EOMI, non-icterus, supple, no BHARATH, no JVD, RRR, CTAB; Abd-soft, NT/ND, +mild RT CVAT, no HSM; no LE edema, A&Ox3, nonfocal; Skin-warm/dry

## 2019-10-11 ENCOUNTER — TRANSCRIPTION ENCOUNTER (OUTPATIENT)
Age: 44
End: 2019-10-11

## 2019-10-11 NOTE — DISCUSSION/SUMMARY
[ED] : a call from ED [FreeTextEntry1] : Pt seen in [facility] ED on 10/10 for hematuria.\par   \par 42 yo F with scleroderma on cellcept, hydroquinidine, h/o right proximal ureter sone (4mm, 9/22), p/w hematuria and right lower adb pain on 10/10.  Hematuria thought to be 2/2 retained stone.  CT abd demonstrated mild right hydronephrosis.  Pt states today that her hematuria has resolved and she feels better. Pt has upcoming apt with urology on 10/16/2019 which she plan to attend. Pt states that she is busy next week but will call our clinic to schedule her next internal medicine visit. \par \par , Firm 3

## 2019-10-11 NOTE — DISCUSSION/SUMMARY
[ED] : a call from ED [FreeTextEntry1] : Pt seen in [facility] ED on 10/10 for hematuria.\par   \par 44 yo F with scleroderma on cellcept, hydroquinidine, h/o right proximal ureter sone (4mm, 9/22), p/w hematuria and right lower adb pain on 10/10.  Hematuria thought to be 2/2 retained stone.  CT abd demonstrated mild right hydronephrosis.  Pt states today that her hematuria has resolved and she feels better. Pt has upcoming apt with urology on 10/16/2019 which she plan to attend. Pt states that she is busy next week but will call our clinic to schedule her next internal medicine visit. \par \par , Firm 3

## 2019-10-12 LAB
BACTERIA UR CULT: SIGNIFICANT CHANGE UP
SPECIMEN SOURCE: SIGNIFICANT CHANGE UP

## 2019-10-16 ENCOUNTER — APPOINTMENT (OUTPATIENT)
Dept: UROLOGY | Facility: CLINIC | Age: 44
End: 2019-10-16
Payer: MEDICAID

## 2019-10-16 VITALS
SYSTOLIC BLOOD PRESSURE: 113 MMHG | BODY MASS INDEX: 21.71 KG/M2 | DIASTOLIC BLOOD PRESSURE: 79 MMHG | HEART RATE: 77 BPM | TEMPERATURE: 98 F | WEIGHT: 115 LBS | HEIGHT: 61 IN | RESPIRATION RATE: 17 BRPM

## 2019-10-16 PROCEDURE — 99203 OFFICE O/P NEW LOW 30 MIN: CPT

## 2019-10-18 NOTE — REVIEW OF SYSTEMS
[Negative] : Heme/Lymph [Feeling Tired] : feeling tired [Shortness Of Breath] : shortness of breath [Heartburn] : heartburn [see HPI] : see HPI [Loss of interest] : loss of interest in sexual activity [Genital bacterial infection] : genital bacterial infection [Genital yeast infection] : genital yeast infection [Sexually Transmitted Disease (STD)] : sexually transmitted disease [Date of last menstrual period ____] : date of last menstrual period: [unfilled] [Presently in menopause ___] : presently in menopause [unfilled] [Seen by urologist before (Name)  ___] : Previously seen by a urologist: [unfilled] [Pain during urination] : pain during urination [Urine Infection (bladder/kidney)] : bladder/kidney infection [Told you have blood in urine on a urine test] : told blood was present in a urine test [Blood in urine that you can see] : blood visible in urine [Discharge from urine canal] : discharge from urine canal [History of kidney stones] : history of kidney stones [Urine retention] : urine retention [Wake up at night to urinate  How many times?  ___] : wakes up to urinate [unfilled] times during the night [Strong urge to urinate] : strong urge to urinate [Strain or push to urinate] : strain or push to urinate [Wait a long time to urinate] : waits a long time to urinate [Slow urine stream] : slow urine stream [Bladder fullness after urinating] : bladder fullness after urinating [Leakage of urine with straining, coughing, laughing] : leakage of urine with straining, coughing, laughing [Leakage of urine with urgency] : leakage of urine with urgency [Joint Pain] : joint pain [Muscle Weakness] : muscle weakness [Unaware of when urine is leaking] : unaware of when urine is leaking [Feelings Of Weakness] : feelings of weakness

## 2019-10-20 ENCOUNTER — RX RENEWAL (OUTPATIENT)
Age: 44
End: 2019-10-20

## 2019-10-23 NOTE — HISTORY OF PRESENT ILLNESS
[FreeTextEntry1] : 43 year old female presents for renal colic. \par Presented with right renal colic at [facility] on 10/10/19 right ureter vesicle junction 4mm. Pt has not yet passed the stone and is still feeling occasional pain.  \par Pt has scleroderma and hypothyroidism\par She is going to [facility] on 10/19/19. \par \par Recommended pt to buy a tea strainer to catch her stone. \par Pt was advised to increase fluid intake. \par Prescribed Tamsulosin 0.4mg, 1 tablet qhs to take until stone has passed. \par LL in 1 month and RTO in 2 months for results.

## 2019-10-23 NOTE — ASSESSMENT
[FreeTextEntry1] : Recommended pt to buy a tea strainer to catch her stone.  Pt was advised to increase fluid intake.  Prescribed Tamsulosin 0.4mg, 1 tablet qhs to take until stone has passed.  LL in 1 month and RTO in 2 months for results.

## 2019-10-23 NOTE — ADDENDUM
[FreeTextEntry1] :  I, Maryann Quintana, acted solely as a scribe for Dr. Prashant Kenyon. The documentation recorded by the scribe accurately reflects the service I personally performed and the decision by me.\par

## 2019-11-13 ENCOUNTER — OTHER (OUTPATIENT)
Age: 44
End: 2019-11-13

## 2019-11-13 ENCOUNTER — TRANSCRIPTION ENCOUNTER (OUTPATIENT)
Age: 44
End: 2019-11-13

## 2019-11-27 ENCOUNTER — APPOINTMENT (OUTPATIENT)
Dept: INTERNAL MEDICINE | Facility: CLINIC | Age: 44
End: 2019-11-27
Payer: MEDICAID

## 2019-11-27 ENCOUNTER — OUTPATIENT (OUTPATIENT)
Dept: OUTPATIENT SERVICES | Facility: HOSPITAL | Age: 44
LOS: 1 days | End: 2019-11-27

## 2019-11-27 VITALS
DIASTOLIC BLOOD PRESSURE: 78 MMHG | HEIGHT: 61 IN | BODY MASS INDEX: 21.34 KG/M2 | SYSTOLIC BLOOD PRESSURE: 110 MMHG | HEART RATE: 81 BPM | WEIGHT: 113 LBS

## 2019-11-27 DIAGNOSIS — Z98.89 OTHER SPECIFIED POSTPROCEDURAL STATES: Chronic | ICD-10-CM

## 2019-11-27 DIAGNOSIS — Z98.890 OTHER SPECIFIED POSTPROCEDURAL STATES: Chronic | ICD-10-CM

## 2019-11-27 PROCEDURE — 99213 OFFICE O/P EST LOW 20 MIN: CPT

## 2019-11-27 RX ORDER — ATOVAQUONE AND PROGUANIL HYDROCHLORIDE 250; 100 MG/1; MG/1
250-100 TABLET, FILM COATED ORAL DAILY
Qty: 30 | Refills: 0 | Status: COMPLETED | COMMUNITY
Start: 2019-10-07 | End: 2019-11-06

## 2019-11-27 RX ORDER — AZITHROMYCIN 250 MG/1
250 TABLET, FILM COATED ORAL
Qty: 8 | Refills: 0 | Status: COMPLETED | COMMUNITY
Start: 2019-10-07 | End: 2019-10-13

## 2019-12-02 ENCOUNTER — RX RENEWAL (OUTPATIENT)
Age: 44
End: 2019-12-02

## 2019-12-02 DIAGNOSIS — M21.371 FOOT DROP, RIGHT FOOT: ICD-10-CM

## 2019-12-02 DIAGNOSIS — F32.0 MAJOR DEPRESSIVE DISORDER, SINGLE EPISODE, MILD: ICD-10-CM

## 2019-12-02 DIAGNOSIS — M34.9 SYSTEMIC SCLEROSIS, UNSPECIFIED: ICD-10-CM

## 2019-12-02 NOTE — REVIEW OF SYSTEMS
[Fever] : no fever [Chills] : no chills [Dysuria] : no dysuria [Hematuria] : no hematuria [Back Pain] : no back pain [FreeTextEntry9] : see hpi

## 2019-12-02 NOTE — HISTORY OF PRESENT ILLNESS
[FreeTextEntry1] : worried about her right foot [de-identified] : 43-year-old female with scleroderma, hypothyroidism, interstitial lung disease and right winged scapula returns for follow up of kidney stone with concerns about her inability to have her right foot in the flat position.\par \par She notices that for several months she is unable to keep her right foot flat on the ground, each time in the resting position it seems to deviate outward.  She denies any pain but does admit to some discomfort. She is concerned about this and wonders if it is related to her scleroderma.\par \par In regards to her kidney stone, she believes she has passed it although she did not see a stone come out.  She no longer experiences any pain anymore.  She continues to keep herself well hydrated and is no longer taking the  Tamsulosin.\par \par Also doing much better in regards to her depression as she saw her family when she went to [place] and believes that it was worries over them that her depressed.

## 2019-12-02 NOTE — ASSESSMENT
[FreeTextEntry1] : 44 year old female with scleroderma, hypothyroidism and mild depression presents with concerns for possible foot drop.  Patient is much improved in regards to depression.  Follow up in a year for comprehensive visit and as needed sooner.\par \par Assessment as indicated above in impressions with plans through orders below.\par

## 2019-12-02 NOTE — PHYSICAL EXAM
[No Acute Distress] : no acute distress [Well Nourished] : well nourished [de-identified] : right foot without any tenderness to palpation, noted lateral deviation of foot and inability to lay flat on ground.  left foot able to plant on ground flat. +2 pulses both feet.

## 2019-12-11 ENCOUNTER — APPOINTMENT (OUTPATIENT)
Dept: RHEUMATOLOGY | Facility: CLINIC | Age: 44
End: 2019-12-11
Payer: MEDICAID

## 2019-12-11 VITALS
TEMPERATURE: 98 F | BODY MASS INDEX: 21.34 KG/M2 | SYSTOLIC BLOOD PRESSURE: 110 MMHG | DIASTOLIC BLOOD PRESSURE: 68 MMHG | OXYGEN SATURATION: 98 % | HEART RATE: 78 BPM | RESPIRATION RATE: 16 BRPM | WEIGHT: 113 LBS | HEIGHT: 61 IN

## 2019-12-11 PROCEDURE — 99214 OFFICE O/P EST MOD 30 MIN: CPT

## 2019-12-12 LAB
ALBUMIN SERPL ELPH-MCNC: 4.7 G/DL
ALP BLD-CCNC: 107 U/L
ALT SERPL-CCNC: 15 U/L
ANION GAP SERPL CALC-SCNC: 11 MMOL/L
AST SERPL-CCNC: 21 U/L
BASOPHILS # BLD AUTO: 0.04 K/UL
BASOPHILS NFR BLD AUTO: 0.5 %
BILIRUB SERPL-MCNC: 0.4 MG/DL
BUN SERPL-MCNC: 14 MG/DL
CALCIUM SERPL-MCNC: 9.5 MG/DL
CHLORIDE SERPL-SCNC: 104 MMOL/L
CK SERPL-CCNC: 107 U/L
CO2 SERPL-SCNC: 28 MMOL/L
CREAT SERPL-MCNC: 0.52 MG/DL
CRP SERPL-MCNC: <0.1 MG/DL
EOSINOPHIL # BLD AUTO: 0.27 K/UL
EOSINOPHIL NFR BLD AUTO: 3.4 %
ERYTHROCYTE [SEDIMENTATION RATE] IN BLOOD BY WESTERGREN METHOD: 14 MM/HR
FERRITIN SERPL-MCNC: 32 NG/ML
GLUCOSE SERPL-MCNC: 104 MG/DL
HCT VFR BLD CALC: 44.4 %
HGB BLD-MCNC: 13.8 G/DL
IMM GRANULOCYTES NFR BLD AUTO: 0.2 %
LDH SERPL-CCNC: 173 U/L
LYMPHOCYTES # BLD AUTO: 2.46 K/UL
LYMPHOCYTES NFR BLD AUTO: 30.7 %
MAN DIFF?: NORMAL
MCHC RBC-ENTMCNC: 25.3 PG
MCHC RBC-ENTMCNC: 31.1 GM/DL
MCV RBC AUTO: 81.3 FL
MONOCYTES # BLD AUTO: 0.77 K/UL
MONOCYTES NFR BLD AUTO: 9.6 %
MYOGLOBIN SERPL-MCNC: 39 NG/ML
NEUTROPHILS # BLD AUTO: 4.45 K/UL
NEUTROPHILS NFR BLD AUTO: 55.6 %
PLATELET # BLD AUTO: 193 K/UL
POTASSIUM SERPL-SCNC: 4.6 MMOL/L
PROT SERPL-MCNC: 7.5 G/DL
RBC # BLD: 5.46 M/UL
RBC # FLD: 14.2 %
SODIUM SERPL-SCNC: 142 MMOL/L
WBC # FLD AUTO: 8.01 K/UL

## 2019-12-16 ENCOUNTER — RX RENEWAL (OUTPATIENT)
Age: 44
End: 2019-12-16

## 2019-12-19 ENCOUNTER — OUTPATIENT (OUTPATIENT)
Dept: OUTPATIENT SERVICES | Facility: HOSPITAL | Age: 44
LOS: 1 days | End: 2019-12-19
Payer: MEDICAID

## 2019-12-19 ENCOUNTER — APPOINTMENT (OUTPATIENT)
Dept: UROLOGY | Facility: CLINIC | Age: 44
End: 2019-12-19
Payer: MEDICAID

## 2019-12-19 DIAGNOSIS — Z98.890 OTHER SPECIFIED POSTPROCEDURAL STATES: Chronic | ICD-10-CM

## 2019-12-19 DIAGNOSIS — Z98.89 OTHER SPECIFIED POSTPROCEDURAL STATES: Chronic | ICD-10-CM

## 2019-12-19 PROCEDURE — 76775 US EXAM ABDO BACK WALL LIM: CPT | Mod: 26

## 2019-12-19 PROCEDURE — 99213 OFFICE O/P EST LOW 20 MIN: CPT | Mod: 25

## 2019-12-19 PROCEDURE — 76775 US EXAM ABDO BACK WALL LIM: CPT

## 2019-12-19 NOTE — ASSESSMENT
[FreeTextEntry1] : US renal today shows no evidence of stones bilaterally. A previously seen right distal UVJ stone was NOT present on today's exam. Both ureteral jets were observed. Both kidneys are normal in size and echogenicity without hydronephrosis or solid masses visualized.\par LL and RTO in 1 month for results.

## 2019-12-19 NOTE — PHYSICAL EXAM
[General Appearance - Well Developed] : well developed [General Appearance - Well Nourished] : well nourished [Normal Appearance] : normal appearance [Well Groomed] : well groomed [Abdomen Soft] : soft [General Appearance - In No Acute Distress] : no acute distress [Abdomen Tenderness] : non-tender [Costovertebral Angle Tenderness] : no ~M costovertebral angle tenderness [Urinary Bladder Findings] : the bladder was normal on palpation [Edema] : no peripheral edema [] : no respiratory distress [Respiration, Rhythm And Depth] : normal respiratory rhythm and effort [Oriented To Time, Place, And Person] : oriented to person, place, and time [Exaggerated Use Of Accessory Muscles For Inspiration] : no accessory muscle use [Affect] : the affect was normal [Mood] : the mood was normal [Not Anxious] : not anxious [Normal Station and Gait] : the gait and station were normal for the patient's age [No Focal Deficits] : no focal deficits [No Palpable Adenopathy] : no palpable adenopathy

## 2019-12-19 NOTE — HISTORY OF PRESENT ILLNESS
[FreeTextEntry1] : 44 year old female presents for renal colic\par Presented with right renal colic at [facility] on 10/10/19 right ureter vesicle junction 4mm. Now has no pain on the right but is unsure if she has passed a stone. She woke his morning with left flank pain. \par Pt has scleroderma and hypothyroidism\par Tamsulosin prescribed on last visit.\par \par US renal today shows no evidence of stones bilaterally. A previously seen right distal UVJ stone was NOT present on today's exam. Both ureteral jets were observed. Both kidneys are normal in size and echogenicity without hydronephrosis or solid masses visualized.\par LL and RTO in 1 month for results.

## 2019-12-27 DIAGNOSIS — N23 UNSPECIFIED RENAL COLIC: ICD-10-CM

## 2020-01-15 NOTE — H&P ADULT - CLICK TO LAUNCH ORM
Patient:   ARPAN DONATO            MRN: [id number]            FIN: [id number]              Age:   89 years     Sex:  MALE     :  30   Associated Diagnoses:   None   Author:   PAULINO CARMONA     Impression and Plan   Diagnosis     Reason for consult: ESRD  89-year-old gentleman with a history of end-stage renal disease, peripheral vascular disease admitted with hypothermia and bradycardia  - recurrent bradycardia and hypotension  - transiently on dopamine  - now with recurrent hypoglycemia  Impression  *End-stage renal disease on hemodialysis: Usually dialyzes Monday, Monday, Friday.  - dialysis yesterday  - hold today due to ongoing fluctuations in HR/glucose  - HD tomorrow  *Hypokalemia: Suspected secondary to his GI losses.  We will give him a small dose of IV potassium  - improved  *Hypotension: Secondary to suspected sepsis versus cardiogenic  - improved with improved HR after recurent drop last night  *Hypothermia: Unclear etiology.  Concern for sepsis given his overall condition  - continues to fluctuate  *Hypoglycemia: etiology unclear  - insulin, c-peptide values ordered  - d10 infusion  *Toxic metabolic encephalopathy  - worse today  *Suspected UTI.     Orders     Orders   Pharmacy:  glucagon injection 1 mg (Discontinue Processing): 01/15/20 12:00 CST.    Orders   Patient Care:  Nursing Communication (Order Processing): 01/15/20 12:08 CST  Pharmacy:  dextrose (glucose) injection 50% (Order Processing): 25 gm, IV Push, Q1H, PRN abnormal blood glucose, Routine, Order Start: 01/15/20 12:08 CST, Injection  Dialysis:  Hemodialysis Treatment (Order Processing): 20 07:00 CST, One Time, Time Length (Hrs/Min) : 3 hrs, Dialyzer : 160 NR, Dialysis Solution : Dialysate Premix, K : 4, Ca : 2.5, BiCarb : 38, Na : 138, Blood Flow Rate : 400, Dialysate Flow Rate : 600, Fluid Remove : 1 L.    1. Hold HD today  2. D10 infusion  3. Ongoing eval for source of hypoglycemia, hypothermia  4. Likely HD  tomorrow  5. Transfer out ICU pending glucose  d/w Dr Elder  d/w Dr Pearson  .       Basic Information   Admit information:  Pt seen and examined .      Review of Systems   Constitutional:  Weakness.    Respiratory:  Negative.    Cardiovascular:  Negative.    Gastrointestinal:  Diarrhea.    Genitourinary:  Negative.    Immunologic:  Negative.    Musculoskeletal:  Negative.    Integumentary:  Negative.    Neurologic:  Negative.    Psychiatric:  Negative.    Unable to obtain     Health Status   Allergies:    Allergic Reactions (All)  Severity Not Documented  SulfADIAZINE- Rash.   Current medications:  (Selected)   Inpatient Medications  Ordered  D10 0.45S 1000 mL: 1,000 mL, IV, RATE: 40 mL/hr, Infuse over 25 hr, 1,000 mL TOTAL Volume, STAT, Order Start: 01/15/20 11:51:00 CST, IV Soln, Weight: 55.6 kg Clinical Weight  acetaminophen (Tylenol).: 650 mg = 2 tab, Oral, Q6H, PRN pain mild, Routine, Order Start: 20 21:41:00 CST, Tab  amLODIPine oral 2.5 mg tablet: 2.5 mg = 1 tab, Oral, Daily, Routine, Order Start: 01/15/20 10:40:00 CST, Tab  glucagon injection 1 m mg = 5 mL, IV Push, Once (scheduled), RATE: 150 mL/hr, Infuse over 2 minutes, 5 mL TOTAL Volume, Routine, Order Start: 01/15/20 11:00:00 CST, Order Stop: 01/15/20 11:00:00 CST  ondansetron (Zofran).: 4 mg = 2 mL, Slow IV Push, Q8H, PRN nausea, Routine, Order Start: 20 21:41:00 CST, Injection  ondansetron injection 2 mg/mL (Zofran): 4 mg = 2 mL, Slow IV Push, Once (scheduled), Routine, Order Start: 01/15/20 11:00:00 CST, Order Stop: 01/15/20 11:00:00 CST, Injection  pneumococcal 23-polyvalent vaccine IM injection: 0.5 mL, IM, On Call, Routine, Order Start: 20 23:51:01 CST, Injection  saline (sodium chloride) IV lock flush injection.: 3 mL, Flush, As Directed PRN, maintain patency, Routine, Order Start: 20 16:58:00 CST, Injection, At least once every 12 hours, not to exceed 30 mL in 24 hours  Documented  Medications  Documented  amLODIPine oral 2.5 mg tablet: 2.5 mg = 1 tab, Oral, Daily, Tab, Maintenance  carvedilol oral 12.5 mg tablet: 12.5 mg = 1 tab, Oral, BID, Tab, Maintenance  irbesartan 150 mg oral tablet: 150 mg = 1 tab, Oral, Q Bedtime, Tab, Maintenance  warfarin 4 mg oral tablet: 2 mg = 0.5 tab, Oral, Q Mon & Tue, Tab, Maintenance  warfarin 4 mg oral tablet: 2 mg = 0.5 tab, Oral, Q Thu & Sat, Tab, Maintenance  warfarin 4 mg oral tablet: 4 mg = 1 tab, Oral, Q Sunday, Tab, Maintenance  warfarin 4 mg oral tablet: 4 mg = 1 tab, Oral, Q Wed & Fri, Tab, Maintenance,   Medications (8) Active  Scheduled: (4)  AmLODIPine 2.5 mg tab  2.5 mg 1 tab, Oral, Daily  glucagon  5 mg 5 mL, IV Push, Once (scheduled)  Ondansetron 4 mg/2 mL inj SDV  4 mg 2 mL, Slow IV Push, Once (scheduled)  Pneumococcal adult vaccine 23-polyvalent 0.5 mL IM inj SDV  0.5 mL, IM, On Call  Continuous: (1)  Dextrose 10% - 0.45% NaCl 1000 mL  1,000 mL, IV, 40 mL/hr  PRN: (3)  Acetaminophen 325 mg tab  650 mg 2 tab, Oral, Q6H  Ondansetron 4 mg/2 mL inj SDV  4 mg 2 mL, Slow IV Push, Q8H  Sodium chloride PF 0.9% flush inj 3 mL  3 mL, Flush, As Directed PRN      Physical Examination   VS/Measurements     Vitals between:   14-JAN-2020 11:58:00   TO   15-KEELY-2020 11:58:00                   LAST RESULT MINIMUM MAXIMUM  Temperature 36.5 35.8 36.8  Heart Rate 63 39 84  Respiratory Rate 14 11 30  NISBP           158 89 177  NIDBP           83 50 153  NIMBP           103 66 161  SpO2                    98 91 100  FiO2                    0.21 0.21 0.21  , Measurements from flowsheet : Height and Weight   01/15/20 05:00 CST CLINICALWEIGHT 53.2 kg    Weight Method Measured    Weight Scale Bed   01/14/20 14:00 CST Weight Scale Bed   01/14/20 11:07 CST Weight Scale Bed       General:  Alert and oriented, No acute distress.    HENT:  Normocephalic.    Neck:  Supple, Non-tender.    Respiratory:  Lungs are clear to auscultation, Respirations are non-labored.    Cardiovascular:  Normal rate, Regular rhythm.    Gastrointestinal:  Soft, Non-tender.    Integumentary:  Warm.    Neurologic:  Alert.    Psychiatric:  Cooperative.      Review / Management   Results review:     Labs between:  14-JAN-2020 11:58 to 15-KEELY-2020 11:58  CBC:                 WBC  HgB  Hct  Plt  MCV  RDW   15-KEELY-2020 (L) 3.5  (L) 9.3  (L) 28.3  (L) 42  (H) 108.8  (H) 17.4  DIFF:                 Seg  Neutroph//ABS  Lymph//ABS  Mono//ABS  EOS/ABS  15-KEELY-2020 NOT APPLICABLE  80 // 2.8 13 // (L) 0.4  4 // (L) 0.1  2 // 0.1  BMP:                 Na  Cl  BUN  Glu   15-KEELY-2020 138  105  17  (!) 36                              K  CO2  Cr  Ca                              3.7  27  (H) 3.60  (L) 7.7   CMP:                 AST  ALT  AlkPhos  Bili  Albumin   15-KEELY-2020 26  25  65  0.9  (L) 3.1   POC GLU:                 Latest Result  Latest Date  Minimum  Min Date  Maximum  Max Date                             (!) 38  15-KEELY-2020 (!) 38  15-KEELY-2020 (!) 26  15-KEELY-2020  Blood Gas:            Ph  PCO2  PO2  BiCarb  BaseExcess   Arterial:  14-JAN-2020 (H) 7.51  35  (L) 71  28  (H) 5                              Ionized Ca  Na  K  HgB  Lactic Acid                                      1.0                  .    Documentation reviewed:  Records from referring facility, Reviewed prior records.   Condition:  Serious.   .

## 2020-01-17 ENCOUNTER — RX RENEWAL (OUTPATIENT)
Age: 45
End: 2020-01-17

## 2020-01-18 NOTE — REASON FOR VISIT
RN MS NOTES

PT IN BED, ASLEEP, EASY TO AROUSE, NO COMPLAINT OF PAIN AT THIS TIME, NOT IN DISTRESS, 
BREATHING TREATMENT ADMINISTERED BY RT, PM MEDS GIVEN, SEEN BY ID MD, PLAN OF CARE DISCUSSED 
WITH PT, VERBALIZED UNDERSTANDING, PT REFUSES TO BE REPOSITIONED, DISCUSSED RISKS BUT STILL 
REFUSED, MD INFORMED OF PT'S BODY TEMP, TYLENOL GIVEN. [Initial Visit ___] : [unfilled] is here today for an initial visit  for [unfilled]

## 2020-01-29 ENCOUNTER — APPOINTMENT (OUTPATIENT)
Dept: PODIATRY | Facility: HOSPITAL | Age: 45
End: 2020-01-29

## 2020-02-05 ENCOUNTER — APPOINTMENT (OUTPATIENT)
Dept: UROLOGY | Facility: CLINIC | Age: 45
End: 2020-02-05

## 2020-02-11 ENCOUNTER — TRANSCRIPTION ENCOUNTER (OUTPATIENT)
Age: 45
End: 2020-02-11

## 2020-03-01 ENCOUNTER — RX RENEWAL (OUTPATIENT)
Age: 45
End: 2020-03-01

## 2020-03-06 ENCOUNTER — APPOINTMENT (OUTPATIENT)
Dept: RHEUMATOLOGY | Facility: CLINIC | Age: 45
End: 2020-03-06
Payer: MEDICAID

## 2020-03-06 VITALS
HEART RATE: 86 BPM | WEIGHT: 113 LBS | TEMPERATURE: 97.6 F | OXYGEN SATURATION: 99 % | SYSTOLIC BLOOD PRESSURE: 124 MMHG | HEIGHT: 61 IN | DIASTOLIC BLOOD PRESSURE: 85 MMHG | BODY MASS INDEX: 21.34 KG/M2

## 2020-03-06 LAB
25(OH)D3 SERPL-MCNC: 23.5 NG/ML
ALBUMIN SERPL ELPH-MCNC: 5 G/DL
ALP BLD-CCNC: 114 U/L
ALT SERPL-CCNC: 16 U/L
ANION GAP SERPL CALC-SCNC: 14 MMOL/L
APPEARANCE: CLEAR
AST SERPL-CCNC: 20 U/L
BACTERIA: NEGATIVE
BASOPHILS # BLD AUTO: 0.03 K/UL
BASOPHILS NFR BLD AUTO: 0.4 %
BILIRUB SERPL-MCNC: 0.4 MG/DL
BILIRUBIN URINE: NEGATIVE
BLOOD URINE: NEGATIVE
BUN SERPL-MCNC: 10 MG/DL
CALCIUM SERPL-MCNC: 10.2 MG/DL
CHLORIDE SERPL-SCNC: 100 MMOL/L
CK SERPL-CCNC: 105 U/L
CO2 SERPL-SCNC: 27 MMOL/L
COLOR: COLORLESS
CREAT SERPL-MCNC: 0.52 MG/DL
CREAT SPEC-SCNC: 15 MG/DL
CREAT/PROT UR: NORMAL RATIO
CRP SERPL-MCNC: <0.1 MG/DL
EOSINOPHIL # BLD AUTO: 0.3 K/UL
EOSINOPHIL NFR BLD AUTO: 4 %
ERYTHROCYTE [SEDIMENTATION RATE] IN BLOOD BY WESTERGREN METHOD: 43 MM/HR
FERRITIN SERPL-MCNC: 39 NG/ML
GLUCOSE QUALITATIVE U: NEGATIVE
GLUCOSE SERPL-MCNC: 77 MG/DL
HCT VFR BLD CALC: 43.8 %
HGB BLD-MCNC: 13.4 G/DL
HYALINE CASTS: 2 /LPF
IMM GRANULOCYTES NFR BLD AUTO: 0.4 %
KETONES URINE: NEGATIVE
LEUKOCYTE ESTERASE URINE: NEGATIVE
LYMPHOCYTES # BLD AUTO: 2.38 K/UL
LYMPHOCYTES NFR BLD AUTO: 32 %
MAN DIFF?: NORMAL
MCHC RBC-ENTMCNC: 25.2 PG
MCHC RBC-ENTMCNC: 30.6 GM/DL
MCV RBC AUTO: 82.3 FL
MICROSCOPIC-UA: NORMAL
MONOCYTES # BLD AUTO: 0.84 K/UL
MONOCYTES NFR BLD AUTO: 11.3 %
MYOGLOBIN SERPL-MCNC: 46 NG/ML
NEUTROPHILS # BLD AUTO: 3.85 K/UL
NEUTROPHILS NFR BLD AUTO: 51.9 %
NITRITE URINE: NEGATIVE
PH URINE: 6.5
PLATELET # BLD AUTO: 202 K/UL
POTASSIUM SERPL-SCNC: 4 MMOL/L
PROT SERPL-MCNC: 7.5 G/DL
PROT UR-MCNC: <4 MG/DL
PROTEIN URINE: NEGATIVE
RBC # BLD: 5.32 M/UL
RBC # FLD: 15 %
RED BLOOD CELLS URINE: 1 /HPF
SODIUM SERPL-SCNC: 140 MMOL/L
SPECIFIC GRAVITY URINE: 1.01
SQUAMOUS EPITHELIAL CELLS: 4 /HPF
T4 FREE SERPL-MCNC: 1.8 NG/DL
TSH SERPL-ACNC: 0.1 UIU/ML
UROBILINOGEN URINE: NORMAL
WBC # FLD AUTO: 7.43 K/UL
WHITE BLOOD CELLS URINE: 0 /HPF

## 2020-03-06 PROCEDURE — 99214 OFFICE O/P EST MOD 30 MIN: CPT

## 2020-03-06 RX ORDER — CALCIUM CARBONATE/VITAMIN D2 600-3.125
600-125 TABLET ORAL DAILY
Qty: 90 | Refills: 1 | Status: DISCONTINUED | COMMUNITY
Start: 2019-08-05 | End: 2020-03-06

## 2020-03-06 RX ORDER — PNV NO.153/FA/OM3/DHA/EPA/FISH 400-35-25
125 MCG TABLET,CHEWABLE ORAL
Qty: 90 | Refills: 1 | Status: DISCONTINUED | COMMUNITY
Start: 2019-06-28 | End: 2020-03-06

## 2020-03-06 RX ORDER — TAMSULOSIN HYDROCHLORIDE 0.4 MG/1
0.4 CAPSULE ORAL
Qty: 14 | Refills: 11 | Status: DISCONTINUED | COMMUNITY
Start: 2019-10-16 | End: 2020-03-06

## 2020-03-06 RX ORDER — CLOTRIMAZOLE 10 MG/G
1 CREAM TOPICAL 3 TIMES DAILY
Qty: 1 | Refills: 0 | Status: DISCONTINUED | COMMUNITY
Start: 2018-07-25 | End: 2020-03-06

## 2020-03-07 NOTE — ASSESSMENT
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

## 2020-03-07 NOTE — PHYSICAL EXAM
[General Appearance - Alert] : alert [General Appearance - In No Acute Distress] : in no acute distress [Nasal Cavity] : the nasal mucosa and septum were normal [Respiration, Rhythm And Depth] : normal respiratory rhythm and effort [Auscultation Breath Sounds / Voice Sounds] : lungs were clear to auscultation bilaterally [Heart Sounds] : normal S1 and S2 [Murmurs] : no murmurs [Edema] : there was no peripheral edema [Bowel Sounds] : normal bowel sounds [Abdomen Soft] : soft [Abdomen Tenderness] : non-tender [] : no rash [No Focal Deficits] : no focal deficits [Oriented To Time, Place, And Person] : oriented to person, place, and time [Impaired Insight] : insight and judgment were intact [FreeTextEntry1] : +  telangiectasia; sclerodactyly with skin thickening of forearm and upper arms

## 2020-03-07 NOTE — HISTORY OF PRESENT ILLNESS
[de-identified] : The last visit was in December, 2019 [FreeTextEntry1] : Interval history :\par ----------------------------- \par = on CellCept 1000 mg TID, Plaquenil 200 mg , off prednisone since May, 2019\par = c/o neck pain, but no neck muscle weakness\par = no SOB, no cough , no dysphagia \par

## 2020-03-13 NOTE — ASU PREOP CHECKLIST - SPO2 (%)
----- Message from Robb Walton sent at 3/13/2020  1:45 PM CDT -----  Contact: pt @ [phone]  States GP where pt is located will not see her, due to pt having possible sinus infection, pt states she is needing her blood checked to get medication, pt also states she needs blood pressure medication. Pt very upset, asking for call back as soon as staff can speak w/ her.   100

## 2020-03-18 ENCOUNTER — RX RENEWAL (OUTPATIENT)
Age: 45
End: 2020-03-18

## 2020-04-16 ENCOUNTER — RX RENEWAL (OUTPATIENT)
Age: 45
End: 2020-04-16

## 2020-04-24 ENCOUNTER — APPOINTMENT (OUTPATIENT)
Dept: INTERNAL MEDICINE | Facility: CLINIC | Age: 45
End: 2020-04-24

## 2020-05-18 ENCOUNTER — RX RENEWAL (OUTPATIENT)
Age: 45
End: 2020-05-18

## 2020-05-19 ENCOUNTER — RX RENEWAL (OUTPATIENT)
Age: 45
End: 2020-05-19

## 2020-05-26 ENCOUNTER — APPOINTMENT (OUTPATIENT)
Dept: DERMATOLOGY | Facility: CLINIC | Age: 45
End: 2020-05-26

## 2020-06-01 ENCOUNTER — RX RENEWAL (OUTPATIENT)
Age: 45
End: 2020-06-01

## 2020-07-01 ENCOUNTER — TRANSCRIPTION ENCOUNTER (OUTPATIENT)
Age: 45
End: 2020-07-01

## 2020-07-01 ENCOUNTER — APPOINTMENT (OUTPATIENT)
Dept: RHEUMATOLOGY | Facility: CLINIC | Age: 45
End: 2020-07-01
Payer: MEDICAID

## 2020-07-01 ENCOUNTER — RX RENEWAL (OUTPATIENT)
Age: 45
End: 2020-07-01

## 2020-07-01 VITALS
BODY MASS INDEX: 22.28 KG/M2 | WEIGHT: 118 LBS | HEIGHT: 61 IN | HEART RATE: 89 BPM | SYSTOLIC BLOOD PRESSURE: 136 MMHG | DIASTOLIC BLOOD PRESSURE: 91 MMHG | TEMPERATURE: 97.7 F

## 2020-07-01 DIAGNOSIS — Z87.39 PERSONAL HISTORY OF OTHER DISEASES OF THE MUSCULOSKELETAL SYSTEM AND CONNECTIVE TISSUE: ICD-10-CM

## 2020-07-01 DIAGNOSIS — I73.00 RAYNAUD'S SYNDROME W/OUT GANGRENE: ICD-10-CM

## 2020-07-01 PROCEDURE — 99215 OFFICE O/P EST HI 40 MIN: CPT

## 2020-07-06 ENCOUNTER — RX RENEWAL (OUTPATIENT)
Age: 45
End: 2020-07-06

## 2020-07-06 LAB
ALBUMIN SERPL ELPH-MCNC: 4.8 G/DL
ALP BLD-CCNC: 118 U/L
ALT SERPL-CCNC: 16 U/L
ANION GAP SERPL CALC-SCNC: 12 MMOL/L
APPEARANCE: CLEAR
AST SERPL-CCNC: 21 U/L
BACTERIA: NEGATIVE
BASOPHILS # BLD AUTO: 0.03 K/UL
BASOPHILS NFR BLD AUTO: 0.4 %
BILIRUB SERPL-MCNC: 0.4 MG/DL
BILIRUBIN URINE: NEGATIVE
BLOOD URINE: NORMAL
BUN SERPL-MCNC: 12 MG/DL
CALCIUM SERPL-MCNC: 10 MG/DL
CHLORIDE SERPL-SCNC: 103 MMOL/L
CO2 SERPL-SCNC: 26 MMOL/L
COLOR: NORMAL
CREAT SERPL-MCNC: 0.55 MG/DL
CRP SERPL-MCNC: <0.1 MG/DL
EOSINOPHIL # BLD AUTO: 0.2 K/UL
EOSINOPHIL NFR BLD AUTO: 2.4 %
ERYTHROCYTE [SEDIMENTATION RATE] IN BLOOD BY WESTERGREN METHOD: 28 MM/HR
FERRITIN SERPL-MCNC: 29 NG/ML
GLUCOSE QUALITATIVE U: NEGATIVE
GLUCOSE SERPL-MCNC: 97 MG/DL
HCT VFR BLD CALC: 43.9 %
HGB BLD-MCNC: 13.1 G/DL
HYALINE CASTS: 0 /LPF
IMM GRANULOCYTES NFR BLD AUTO: 0.4 %
KETONES URINE: NEGATIVE
LDH SERPL-CCNC: 176 U/L
LEUKOCYTE ESTERASE URINE: NEGATIVE
LYMPHOCYTES # BLD AUTO: 2.61 K/UL
LYMPHOCYTES NFR BLD AUTO: 31.4 %
MAN DIFF?: NORMAL
MCHC RBC-ENTMCNC: 25 PG
MCHC RBC-ENTMCNC: 29.8 GM/DL
MCV RBC AUTO: 83.6 FL
MICROSCOPIC-UA: NORMAL
MONOCYTES # BLD AUTO: 0.83 K/UL
MONOCYTES NFR BLD AUTO: 10 %
MYOGLOBIN SERPL-MCNC: 51 NG/ML
NEUTROPHILS # BLD AUTO: 4.61 K/UL
NEUTROPHILS NFR BLD AUTO: 55.4 %
NITRITE URINE: NEGATIVE
PH URINE: 6
PLATELET # BLD AUTO: 186 K/UL
POTASSIUM SERPL-SCNC: 4.3 MMOL/L
PROT SERPL-MCNC: 7.5 G/DL
PROTEIN URINE: NEGATIVE
RBC # BLD: 5.25 M/UL
RBC # FLD: 13.9 %
RED BLOOD CELLS URINE: 1 /HPF
SARS-COV-2 IGG SERPL IA-ACNC: 0.09 INDEX
SARS-COV-2 IGG SERPL QL IA: NEGATIVE
SODIUM SERPL-SCNC: 141 MMOL/L
SPECIFIC GRAVITY URINE: 1.01
SQUAMOUS EPITHELIAL CELLS: 4 /HPF
T4 FREE SERPL-MCNC: 1.3 NG/DL
TSH SERPL-ACNC: 0.42 UIU/ML
UROBILINOGEN URINE: NORMAL
WBC # FLD AUTO: 8.31 K/UL
WHITE BLOOD CELLS URINE: 2 /HPF

## 2020-07-07 ENCOUNTER — TRANSCRIPTION ENCOUNTER (OUTPATIENT)
Age: 45
End: 2020-07-07

## 2020-07-19 ENCOUNTER — RX RENEWAL (OUTPATIENT)
Age: 45
End: 2020-07-19

## 2020-07-27 ENCOUNTER — APPOINTMENT (OUTPATIENT)
Dept: OBGYN | Facility: HOSPITAL | Age: 45
End: 2020-07-27
Payer: MEDICAID

## 2020-07-27 ENCOUNTER — LABORATORY RESULT (OUTPATIENT)
Age: 45
End: 2020-07-27

## 2020-07-27 ENCOUNTER — OUTPATIENT (OUTPATIENT)
Dept: OUTPATIENT SERVICES | Facility: HOSPITAL | Age: 45
LOS: 1 days | End: 2020-07-27

## 2020-07-27 VITALS
SYSTOLIC BLOOD PRESSURE: 103 MMHG | BODY MASS INDEX: 22.84 KG/M2 | HEIGHT: 61 IN | TEMPERATURE: 98.5 F | WEIGHT: 121 LBS | DIASTOLIC BLOOD PRESSURE: 69 MMHG | HEART RATE: 92 BPM

## 2020-07-27 DIAGNOSIS — Z98.89 OTHER SPECIFIED POSTPROCEDURAL STATES: Chronic | ICD-10-CM

## 2020-07-27 DIAGNOSIS — Z98.890 OTHER SPECIFIED POSTPROCEDURAL STATES: Chronic | ICD-10-CM

## 2020-07-27 PROCEDURE — 99213 OFFICE O/P EST LOW 20 MIN: CPT | Mod: GC

## 2020-07-29 LAB
C TRACH RRNA SPEC QL NAA+PROBE: SIGNIFICANT CHANGE UP
N GONORRHOEA RRNA SPEC QL NAA+PROBE: SIGNIFICANT CHANGE UP
SPECIMEN SOURCE: SIGNIFICANT CHANGE UP

## 2020-08-07 DIAGNOSIS — N95.1 MENOPAUSAL AND FEMALE CLIMACTERIC STATES: ICD-10-CM

## 2020-08-07 DIAGNOSIS — Z01.419 ENCOUNTER FOR GYNECOLOGICAL EXAMINATION (GENERAL) (ROUTINE) WITHOUT ABNORMAL FINDINGS: ICD-10-CM

## 2020-08-07 NOTE — PHYSICAL EXAM
[Awake] : awake [Alert] : alert [Oriented x3] : oriented to person, place, and time [Soft] : soft [Normal] : cervix [No Bleeding] : there was no active vaginal bleeding [Uterine Adnexae] : were not tender and not enlarged [No Tenderness] : no rectal tenderness [Nl Sphincter Tone] : normal sphincter tone [Acute Distress] : no acute distress [Mass] : no breast mass [Nipple Discharge] : no nipple discharge [Axillary LAD] : no axillary lymphadenopathy [Internal Hemorrhoid] : no internal hemorrhoids [Tender] : non tender [FreeTextEntry6] :  no gross adnexal masses appreciated

## 2020-08-12 NOTE — PATIENT PROFILE ADULT - PATIENT REPRESENTATIVE: ( YOU CAN CHOOSE ANY PERSON THAT CAN ASSIST YOU WITH YOUR HEALTH CARE PREFERENCES, DOES NOT HAVE TO BE A SPOUSE, IMMEDIATE FAMILY OR SIGNIFICANT OTHER/PARTNER)
8/12/2020  Lamar Sosa is a 65 y o  male  [id number]        Diagnosis  Chief Complaint     Nephrolithiasis; Stent w/string removal        Patient is s/p CYSTO, URETEROSCOPY STENT exchange, left, on 8/5/20 with Dr Teresa Lima  Plan  Follow up in 6 months with Renal US prior  Procedure Stent with String Removal    Vitals:    08/12/20 1256   BP: 142/82   Pulse: 76   Temp: (!) 97 3 °F (36 3 °C)   Weight: 93 kg (205 lb)   Height: 5' 8" (1 727 m)       Stent with string removed intact without difficulty  Reviewed post stent removal symptoms including flank pain, dysuria, and hematuria  Instructed patient to increase oral fluid intake, especially water  Patient instructed to call the office or report to the ER for uncontrolled pain, fever, chills, nausea or vomiting         Mishel Mendez LPN yes

## 2020-08-15 ENCOUNTER — TRANSCRIPTION ENCOUNTER (OUTPATIENT)
Age: 45
End: 2020-08-15

## 2020-08-17 ENCOUNTER — RX RENEWAL (OUTPATIENT)
Age: 45
End: 2020-08-17

## 2020-08-17 NOTE — DISCUSSION/SUMMARY
[FreeTextEntry1] : Went to urgent care for abdominal pain, pt concerned for recurrent kidney stone. She received referral to US. Pain is better controlled now. Denies other sxs. Will make follow up appt after US completed.

## 2020-08-26 ENCOUNTER — LABORATORY RESULT (OUTPATIENT)
Age: 45
End: 2020-08-26

## 2020-08-26 ENCOUNTER — OUTPATIENT (OUTPATIENT)
Dept: OUTPATIENT SERVICES | Facility: HOSPITAL | Age: 45
LOS: 1 days | End: 2020-08-26

## 2020-08-26 ENCOUNTER — APPOINTMENT (OUTPATIENT)
Dept: INTERNAL MEDICINE | Facility: CLINIC | Age: 45
End: 2020-08-26
Payer: MEDICAID

## 2020-08-26 VITALS
DIASTOLIC BLOOD PRESSURE: 70 MMHG | WEIGHT: 119 LBS | HEART RATE: 78 BPM | OXYGEN SATURATION: 99 % | HEIGHT: 61 IN | SYSTOLIC BLOOD PRESSURE: 103 MMHG | BODY MASS INDEX: 22.47 KG/M2

## 2020-08-26 DIAGNOSIS — R68.81 EARLY SATIETY: ICD-10-CM

## 2020-08-26 DIAGNOSIS — R10.11 RIGHT UPPER QUADRANT PAIN: ICD-10-CM

## 2020-08-26 DIAGNOSIS — Z71.84 ENC FOR HEALTH COUNSELING RELATED TO TRAVEL: ICD-10-CM

## 2020-08-26 DIAGNOSIS — R12 HEARTBURN: ICD-10-CM

## 2020-08-26 DIAGNOSIS — Z98.890 OTHER SPECIFIED POSTPROCEDURAL STATES: Chronic | ICD-10-CM

## 2020-08-26 DIAGNOSIS — Z11.59 ENCOUNTER FOR SCREENING FOR OTHER VIRAL DISEASES: ICD-10-CM

## 2020-08-26 DIAGNOSIS — Z98.89 OTHER SPECIFIED POSTPROCEDURAL STATES: Chronic | ICD-10-CM

## 2020-08-26 LAB
ALBUMIN SERPL ELPH-MCNC: 4.7 G/DL — SIGNIFICANT CHANGE UP (ref 3.3–5)
ALP SERPL-CCNC: 101 U/L — SIGNIFICANT CHANGE UP (ref 40–120)
ALT FLD-CCNC: 11 U/L — SIGNIFICANT CHANGE UP (ref 4–33)
ANION GAP SERPL CALC-SCNC: 13 MMO/L — SIGNIFICANT CHANGE UP (ref 7–14)
AST SERPL-CCNC: 23 U/L — SIGNIFICANT CHANGE UP (ref 4–32)
BASOPHILS # BLD AUTO: 0.05 K/UL — SIGNIFICANT CHANGE UP (ref 0–0.2)
BASOPHILS NFR BLD AUTO: 0.6 % — SIGNIFICANT CHANGE UP (ref 0–2)
BILIRUB SERPL-MCNC: 0.7 MG/DL — SIGNIFICANT CHANGE UP (ref 0.2–1.2)
BUN SERPL-MCNC: 10 MG/DL — SIGNIFICANT CHANGE UP (ref 7–23)
CALCIUM SERPL-MCNC: 9.4 MG/DL — SIGNIFICANT CHANGE UP (ref 8.4–10.5)
CHLORIDE SERPL-SCNC: 99 MMOL/L — SIGNIFICANT CHANGE UP (ref 98–107)
CO2 SERPL-SCNC: 25 MMOL/L — SIGNIFICANT CHANGE UP (ref 22–31)
CREAT SERPL-MCNC: 0.49 MG/DL — LOW (ref 0.5–1.3)
EOSINOPHIL # BLD AUTO: 0.19 K/UL — SIGNIFICANT CHANGE UP (ref 0–0.5)
EOSINOPHIL NFR BLD AUTO: 2.3 % — SIGNIFICANT CHANGE UP (ref 0–6)
GLUCOSE SERPL-MCNC: 90 MG/DL — SIGNIFICANT CHANGE UP (ref 70–99)
HCT VFR BLD CALC: 46.6 % — HIGH (ref 34.5–45)
HGB BLD-MCNC: 13.9 G/DL — SIGNIFICANT CHANGE UP (ref 11.5–15.5)
IMM GRANULOCYTES NFR BLD AUTO: 0.5 % — SIGNIFICANT CHANGE UP (ref 0–1.5)
LYMPHOCYTES # BLD AUTO: 2.58 K/UL — SIGNIFICANT CHANGE UP (ref 1–3.3)
LYMPHOCYTES # BLD AUTO: 31.4 % — SIGNIFICANT CHANGE UP (ref 13–44)
MCHC RBC-ENTMCNC: 25.1 PG — LOW (ref 27–34)
MCHC RBC-ENTMCNC: 29.8 % — LOW (ref 32–36)
MCV RBC AUTO: 84.1 FL — SIGNIFICANT CHANGE UP (ref 80–100)
MONOCYTES # BLD AUTO: 0.7 K/UL — SIGNIFICANT CHANGE UP (ref 0–0.9)
MONOCYTES NFR BLD AUTO: 8.5 % — SIGNIFICANT CHANGE UP (ref 2–14)
NEUTROPHILS # BLD AUTO: 4.65 K/UL — SIGNIFICANT CHANGE UP (ref 1.8–7.4)
NEUTROPHILS NFR BLD AUTO: 56.7 % — SIGNIFICANT CHANGE UP (ref 43–77)
NRBC # FLD: 0 K/UL — SIGNIFICANT CHANGE UP (ref 0–0)
PLATELET # BLD AUTO: 171 K/UL — SIGNIFICANT CHANGE UP (ref 150–400)
PMV BLD: 12.9 FL — SIGNIFICANT CHANGE UP (ref 7–13)
POTASSIUM SERPL-MCNC: 4.4 MMOL/L — SIGNIFICANT CHANGE UP (ref 3.5–5.3)
POTASSIUM SERPL-SCNC: 4.4 MMOL/L — SIGNIFICANT CHANGE UP (ref 3.5–5.3)
PROT SERPL-MCNC: 7.5 G/DL — SIGNIFICANT CHANGE UP (ref 6–8.3)
RBC # BLD: 5.54 M/UL — HIGH (ref 3.8–5.2)
RBC # FLD: 14.5 % — SIGNIFICANT CHANGE UP (ref 10.3–14.5)
SODIUM SERPL-SCNC: 137 MMOL/L — SIGNIFICANT CHANGE UP (ref 135–145)
T4 FREE SERPL-MCNC: 1.26 NG/DL — SIGNIFICANT CHANGE UP (ref 0.9–1.8)
TSH SERPL-MCNC: 3.87 UIU/ML — SIGNIFICANT CHANGE UP (ref 0.27–4.2)
WBC # BLD: 8.21 K/UL — SIGNIFICANT CHANGE UP (ref 3.8–10.5)
WBC # FLD AUTO: 8.21 K/UL — SIGNIFICANT CHANGE UP (ref 3.8–10.5)

## 2020-08-26 PROCEDURE — 99213 OFFICE O/P EST LOW 20 MIN: CPT

## 2020-08-26 NOTE — REVIEW OF SYSTEMS
[Fever] : no fever [Heartburn] : heartburn [Chills] : no chills [Dysuria] : no dysuria [Memory Loss] : memory loss [FreeTextEntry7] : See also HPI

## 2020-08-26 NOTE — HISTORY OF PRESENT ILLNESS
[FreeTextEntry1] : Follow-up for urgent care visit of right upper quadrant pain [de-identified] : 43-year-old female with scleroderma, hypothyroidism, interstitial lung disease and right winged scapula presents for follow-up visit of right upper quadrant pain.  About a week ago she developed some pain in her abdomen on the upper right side that was radiating sometimes to her back.  She went to the urgent care center worried that it was her kidney stones coming back.  Upon evaluation they did not believe it was her kidney stones but thought it was gallbladder issues.  They recommended for patient to schedule an abdominal ultrasound that she has placed for next Monday.  She is feeling a little bit better but continues to feel generalized abdominal pain whenever she eats anything.  She is also complaining of continued diarrhea with this abdominal discomfort.

## 2020-08-26 NOTE — PHYSICAL EXAM
[Well-Appearing] : well-appearing [No Acute Distress] : no acute distress [Non-distended] : non-distended [Soft] : abdomen soft [Normal Voice/Communication] : normal voice/communication [Non Tender] : non-tender [Normal Bowel Sounds] : normal bowel sounds [No Masses] : no abdominal mass palpated

## 2020-09-15 ENCOUNTER — RESULT REVIEW (OUTPATIENT)
Age: 45
End: 2020-09-15

## 2020-09-15 ENCOUNTER — OUTPATIENT (OUTPATIENT)
Dept: OUTPATIENT SERVICES | Facility: HOSPITAL | Age: 45
LOS: 1 days | End: 2020-09-15
Payer: MEDICAID

## 2020-09-15 ENCOUNTER — APPOINTMENT (OUTPATIENT)
Dept: ULTRASOUND IMAGING | Facility: CLINIC | Age: 45
End: 2020-09-15
Payer: MEDICAID

## 2020-09-15 ENCOUNTER — APPOINTMENT (OUTPATIENT)
Dept: MAMMOGRAPHY | Facility: CLINIC | Age: 45
End: 2020-09-15
Payer: MEDICAID

## 2020-09-15 DIAGNOSIS — Z98.890 OTHER SPECIFIED POSTPROCEDURAL STATES: Chronic | ICD-10-CM

## 2020-09-15 DIAGNOSIS — Z12.31 ENCOUNTER FOR SCREENING MAMMOGRAM FOR MALIGNANT NEOPLASM OF BREAST: ICD-10-CM

## 2020-09-15 DIAGNOSIS — Z98.89 OTHER SPECIFIED POSTPROCEDURAL STATES: Chronic | ICD-10-CM

## 2020-09-15 PROCEDURE — 77067 SCR MAMMO BI INCL CAD: CPT | Mod: 26

## 2020-09-15 PROCEDURE — 77063 BREAST TOMOSYNTHESIS BI: CPT

## 2020-09-15 PROCEDURE — 77063 BREAST TOMOSYNTHESIS BI: CPT | Mod: 26

## 2020-09-15 PROCEDURE — 77067 SCR MAMMO BI INCL CAD: CPT

## 2020-09-15 PROCEDURE — 76641 ULTRASOUND BREAST COMPLETE: CPT | Mod: 26,50

## 2020-09-15 PROCEDURE — 76641 ULTRASOUND BREAST COMPLETE: CPT

## 2020-09-16 ENCOUNTER — RX RENEWAL (OUTPATIENT)
Age: 45
End: 2020-09-16

## 2020-09-24 ENCOUNTER — LABORATORY RESULT (OUTPATIENT)
Age: 45
End: 2020-09-24

## 2020-09-24 ENCOUNTER — APPOINTMENT (OUTPATIENT)
Dept: GASTROENTEROLOGY | Facility: CLINIC | Age: 45
End: 2020-09-24
Payer: MEDICAID

## 2020-09-24 ENCOUNTER — OUTPATIENT (OUTPATIENT)
Dept: OUTPATIENT SERVICES | Facility: HOSPITAL | Age: 45
LOS: 1 days | End: 2020-09-24

## 2020-09-24 VITALS
HEIGHT: 60 IN | HEART RATE: 90 BPM | WEIGHT: 120 LBS | OXYGEN SATURATION: 98 % | BODY MASS INDEX: 23.56 KG/M2 | SYSTOLIC BLOOD PRESSURE: 100 MMHG | DIASTOLIC BLOOD PRESSURE: 70 MMHG | RESPIRATION RATE: 16 BRPM

## 2020-09-24 VITALS — TEMPERATURE: 97.7 F

## 2020-09-24 DIAGNOSIS — R12 HEARTBURN: ICD-10-CM

## 2020-09-24 DIAGNOSIS — Z98.890 OTHER SPECIFIED POSTPROCEDURAL STATES: Chronic | ICD-10-CM

## 2020-09-24 DIAGNOSIS — M34.9 SYSTEMIC SCLEROSIS, UNSPECIFIED: ICD-10-CM

## 2020-09-24 DIAGNOSIS — Z98.89 OTHER SPECIFIED POSTPROCEDURAL STATES: Chronic | ICD-10-CM

## 2020-09-24 DIAGNOSIS — R19.7 DIARRHEA, UNSPECIFIED: ICD-10-CM

## 2020-09-24 DIAGNOSIS — R10.11 RIGHT UPPER QUADRANT PAIN: ICD-10-CM

## 2020-09-24 PROCEDURE — 99203 OFFICE O/P NEW LOW 30 MIN: CPT | Mod: GC

## 2020-09-24 RX ORDER — LOPERAMIDE HCL 2 MG/1
2 TABLET ORAL
Qty: 90 | Refills: 0 | Status: ACTIVE | COMMUNITY
Start: 2020-09-24 | End: 1900-01-01

## 2020-09-25 LAB
TTG IGA SER-ACNC: <1.2 U/ML — SIGNIFICANT CHANGE UP
TTG IGG SER-ACNC: 1.5 U/ML — SIGNIFICANT CHANGE UP

## 2020-09-25 NOTE — REASON FOR VISIT
[Initial Evaluation] : an initial evaluation [FreeTextEntry1] : RUQ abd pain x 1, diffuse abd pain x several weeks, diarrhea x months

## 2020-09-25 NOTE — HISTORY OF PRESENT ILLNESS
[Heartburn] : heartburn worsened [Nausea] : nausea worsened [Vomiting] : denies vomiting [Diarrhea] : diarrhea worsened [Constipation] : denies constipation [Yellow Skin Or Eyes (Jaundice)] : denies jaundice [Abdominal Pain] : abdominal pain worsened [Abdominal Swelling] : denies abdominal swelling [Rectal Pain] : denies rectal pain [Wt Gain ___ Lbs] : recent [unfilled] ~Upound(s) weight gain [de-identified] : 43 y/o F w/ Hx of scleroderma, hypothyroidism, ILD, right winged scapula. \par \par Pt endorses approx 1 month ago, she had 1 episode of RUQ sharp abd pain, associated w/ nausea after a meal -- no vomiting. Negative evaluation for gallstones or kidney stones at urgent care center at the time. LFTs wnl. Sister w/ Hx gallstones.\par \par Now having different abdominal pain, burning sensation that radiates diffusely, occurs after meals, accompanied by nausea x3-4 months (no episodes prior to this) and loss of appetite. Started taking famotidine which has been helpful. Denies acid backwash, odynophagia, dysphagia. \par \par Also reports she has been having on going diarrhea x 2 years total, 4-5x daily (had been started on cellcept 5 years ago), accompanied by bloating. 16 years ago she was having a lot of stomach pain w/ pregnancy related to milk, so she was switched to lactose-free milk which had helped her significantly. Now continues to have diarrhea despite lactose-free milk. Does says her symptoms are worst when she drinks lactaid milk, no symptoms with other dairy products such as cheese or yogurt. \par \par Weight gain 3-4 lbs.

## 2020-09-25 NOTE — PHYSICAL EXAM
[General Appearance - Alert] : alert [General Appearance - In No Acute Distress] : in no acute distress [Sclera] : the sclera and conjunctiva were normal [Extraocular Movements] : extraocular movements were intact [Outer Ear] : the ears and nose were normal in appearance [Hearing Threshold Finger Rub Not [place]] : hearing was normal [Neck Appearance] : the appearance of the neck was normal [Neck Cervical Mass (___cm)] : no neck mass was observed [Respiration, Rhythm And Depth] : normal respiratory rhythm and effort [Exaggerated Use Of Accessory Muscles For Inspiration] : no accessory muscle use [Auscultation Breath Sounds / Voice Sounds] : lungs were clear to auscultation bilaterally [Heart Rate And Rhythm] : heart rate was normal and rhythm regular [Heart Sounds] : normal S1 and S2 [Murmurs] : no murmurs [Heart Sounds Pericardial Friction Rub] : no pericardial rub [Edema] : there was no peripheral edema [Bowel Sounds] : normal bowel sounds [Abdomen Soft] : soft [Abdomen Tenderness] : non-tender [] : no hepato-splenomegaly [Abdomen Mass (___ Cm)] : no abdominal mass palpated [Abdomen Hernia] : no hernia was discovered [No CVA Tenderness] : no ~M costovertebral angle tenderness [Abnormal Walk] : normal gait [Nail Clubbing] : no clubbing  or cyanosis of the fingernails [Oriented To Time, Place, And Person] : oriented to person, place, and time [FreeTextEntry1] : +skin thickening

## 2020-09-25 NOTE — END OF VISIT
[] : Fellow [FreeTextEntry3] : As modified and discussed with patient\par MD FARAZ Cruz [facility]\par Associate Professor of Medicine\par [facility]\par

## 2020-09-25 NOTE — ASSESSMENT
[FreeTextEntry1] : 44 y/o F w/ Hx of scleroderma, hypothyroidism, ILD, right winged scapula presenting to establish care with multiple GI symptoms of heartburn, RUQ pain and diarrhea. \par \par IMPRESSION:\par #Heartburn: pt w/ diffuse abd burning in the setting of scleroderma, able to tolerate both solids and liquids without dysphagia, odynophagia, or weight loss. Will need EGD for evaluation. \par #Diarrhea: 4-5 episodes of watery brown stools daily, for the past 2 years. Pt w/ Hx of cellcept use. Denies f/c, others with similar symptoms. Pt associates diarrhea w/ (lactaid) milk use. Ddx may be 2/2 lactulose/other sugar intolerance vs mycophenolate use vs r/o infectious diarrhea vs celiac disease.\par #RUQ abd pain: one time episode of sharp RUQ pain after a meal, family Hx of gallstones. \par \par PLAN: \par - Plan for EGD\par - NPO before MN prior to EGD\par - RUQ us\par - GI PCR, c diff r/o infectious diarrhea\par - R/o celiac disease w/ transglutaminase Ab and endomysial IgA \par \par \par Otoniel Mesa MD\par GI Fellow, PGY4

## 2020-09-30 ENCOUNTER — RX RENEWAL (OUTPATIENT)
Age: 45
End: 2020-09-30

## 2020-10-01 LAB — ENDOMYSIUM IGA TITR SER IF: NEGATIVE — SIGNIFICANT CHANGE UP

## 2020-10-01 NOTE — ED PROVIDER NOTE - MEDICAL DECISION MAKING DETAILS
Patient presents for blood pressure check with Nurse. BP was 132/80. Pulse is 80. Patient denies any chest pain, sob, headache of changes in vision. Patient states he feels fine and works out daily. Spoke with Dr Russell and pt to incease Lisinopril to 40 mgs and return to office for visit with him in 2 weeks. Patient verbalized understanding and appt made.   Pt presents for cough and CP. Pt states cp is constant, worsens with cough, pain is non-radiating, cough is dry also notes sore throat. Pt denies any other symptoms or complaints. Plan is ekg, cardiac eznyems, tylenol and cxr to r/o pneumonia.

## 2020-10-12 NOTE — ED PROVIDER NOTE - PROGRESS NOTE
56 yo female with  HTN, COPD not on home O2, nasopharyngeal carcinoma s/p radiation in 2013, recurrent diverticulitis more than 6 episodes in the last 10 years, presented for recurrent abdominal pain.     #Recurrent abdominal pain with nausea  -likely due to mild acute uncomplicated sigmoid diverticulitis  -SIRS criteria not met on admission  -Keep NPO for now and    56 yo female with  HTN, COPD not on home O2, nasopharyngeal carcinoma s/p radiation in 2013, recurrent diverticulitis more than 6 episodes in the last 10 years, presented for recurrent abdominal pain.     #Recurrent abdominal pain with nausea  -likely due to mild acute uncomplicated sigmoid diverticulitis  -SIRS criteria not met on admission  -Keep NPO for now and continue with IV hydration  -C/w Cefepime and Flagyl (pt is allergic to Cipro)  -Pain control prn   -GI consult with Dr Pelayo as pt has recurrent episodes     56 yo female with  HTN, COPD not on home O2, nasopharyngeal carcinoma s/p radiation in 2013, recurrent diverticulitis more than 6 episodes in the last 10 years, presented for recurrent abdominal pain.     #Recurrent abdominal pain with nausea  -likely due to mild acute uncomplicated sigmoid diverticulitis  -SIRS criteria not met on admission  -Keep NPO for now and continue with IV hydration. Advance diet as tolerated  -C/w Cefepime and Flagyl (pt is allergic to Cipro)  -Pain control prn   -GI consult with Dr Pelayo as pt has recurrent episodes of diverticulitis  -Pt will need outpatient colonoscopy in 12 weeks post acute episode  -Surgery consult if worsening symptoms.     # HTN-C/w enalapril 10 qd; Pt was hypertensive on admission d/t pain -better now  #COPD- No acute exacerbation; C/w symbicort / albuterol / spiriva / montelukast  #Chronic neck pain and abdominal pain- On hydrocodone/acetaminophen at home.   #Depression/Anxiety- Bupropion  / klonopin 1 bid  #GERD- Protonix 40 qd    #DVT PPX: Lovenox  #GI PPX:  protonix  #Diet: NPO for now  #FULL CODE  #Dispo: Med/surg       Improved. 56 yo female with  HTN, COPD not on home O2, nasopharyngeal carcinoma s/p radiation in 2013, recurrent diverticulitis more than 6 episodes in the last 10 years, presented for recurrent abdominal pain.     #Recurrent abdominal pain with nausea  -likely due to mild acute uncomplicated sigmoid diverticulitis  -SIRS criteria not met on admission  -Keep NPO for now and continue with IV hydration. Advance diet as tolerated  -C/w Cefepime and Flagyl (pt is allergic to Cipro)  -Pain control prn   -GI consult with Dr Pelayo as pt has recurrent episodes of diverticulitis  -Pt will need outpatient colonoscopy in 6-12 weeks post acute episode  -Surgery consult placed -likely no acute intervention inpatient    # HTN-C/w enalapril 10 qd; Pt was hypertensive on admission d/t pain -better now  #COPD- No acute exacerbation; C/w symbicort / albuterol / spiriva / montelukast  #Chronic neck pain and abdominal pain- On hydrocodone/acetaminophen at home.   #Depression/Anxiety- Bupropion  / klonopin 1 bid  #GERD- Protonix 40 qd    #DVT PPX: Lovenox  #GI PPX:  protonix  #Diet: NPO for now  #FULL CODE  #Dispo: Med/surg

## 2020-10-21 ENCOUNTER — RESULT REVIEW (OUTPATIENT)
Age: 45
End: 2020-10-21

## 2020-10-21 ENCOUNTER — OUTPATIENT (OUTPATIENT)
Dept: OUTPATIENT SERVICES | Facility: HOSPITAL | Age: 45
LOS: 1 days | End: 2020-10-21
Payer: MEDICAID

## 2020-10-21 ENCOUNTER — APPOINTMENT (OUTPATIENT)
Dept: ULTRASOUND IMAGING | Facility: CLINIC | Age: 45
End: 2020-10-21
Payer: MEDICAID

## 2020-10-21 DIAGNOSIS — Z98.890 OTHER SPECIFIED POSTPROCEDURAL STATES: Chronic | ICD-10-CM

## 2020-10-21 DIAGNOSIS — Z98.89 OTHER SPECIFIED POSTPROCEDURAL STATES: Chronic | ICD-10-CM

## 2020-10-21 DIAGNOSIS — R10.11 RIGHT UPPER QUADRANT PAIN: ICD-10-CM

## 2020-10-21 PROCEDURE — 76705 ECHO EXAM OF ABDOMEN: CPT

## 2020-10-21 PROCEDURE — 76705 ECHO EXAM OF ABDOMEN: CPT | Mod: 26

## 2020-11-08 ENCOUNTER — APPOINTMENT (OUTPATIENT)
Dept: DISASTER EMERGENCY | Facility: CLINIC | Age: 45
End: 2020-11-08

## 2020-11-09 LAB — SARS-COV-2 N GENE NPH QL NAA+PROBE: NOT DETECTED

## 2020-11-10 ENCOUNTER — APPOINTMENT (OUTPATIENT)
Dept: RHEUMATOLOGY | Facility: CLINIC | Age: 45
End: 2020-11-10
Payer: MEDICAID

## 2020-11-10 VITALS
HEART RATE: 98 BPM | SYSTOLIC BLOOD PRESSURE: 124 MMHG | WEIGHT: 121 LBS | TEMPERATURE: 97.9 F | DIASTOLIC BLOOD PRESSURE: 82 MMHG | OXYGEN SATURATION: 98 % | HEIGHT: 60 IN | BODY MASS INDEX: 23.75 KG/M2

## 2020-11-10 DIAGNOSIS — R59.0 LOCALIZED ENLARGED LYMPH NODES: ICD-10-CM

## 2020-11-10 PROCEDURE — 99072 ADDL SUPL MATRL&STAF TM PHE: CPT

## 2020-11-10 PROCEDURE — 99214 OFFICE O/P EST MOD 30 MIN: CPT | Mod: 25

## 2020-11-10 PROCEDURE — G0008: CPT

## 2020-11-10 PROCEDURE — 90686 IIV4 VACC NO PRSV 0.5 ML IM: CPT

## 2020-11-10 RX ORDER — LOPERAMIDE HYDROCHLORIDE 2 MG/1
2 CAPSULE ORAL
Qty: 90 | Refills: 0 | Status: ACTIVE | COMMUNITY
Start: 2020-09-24

## 2020-11-10 RX ORDER — LEVOTHYROXINE SODIUM 0.07 MG/1
75 TABLET ORAL
Qty: 1 | Refills: 1 | Status: DISCONTINUED | COMMUNITY
Start: 2017-08-18 | End: 2019-11-10

## 2020-11-11 ENCOUNTER — RESULT REVIEW (OUTPATIENT)
Age: 45
End: 2020-11-11

## 2020-11-11 ENCOUNTER — OUTPATIENT (OUTPATIENT)
Dept: OUTPATIENT SERVICES | Facility: HOSPITAL | Age: 45
LOS: 1 days | Discharge: HOME | End: 2020-11-11
Payer: MEDICAID

## 2020-11-11 VITALS
HEART RATE: 77 BPM | OXYGEN SATURATION: 100 % | RESPIRATION RATE: 18 BRPM | DIASTOLIC BLOOD PRESSURE: 67 MMHG | SYSTOLIC BLOOD PRESSURE: 121 MMHG

## 2020-11-11 VITALS
TEMPERATURE: 98 F | HEART RATE: 70 BPM | HEIGHT: 62 IN | RESPIRATION RATE: 16 BRPM | OXYGEN SATURATION: 98 % | WEIGHT: 121.92 LBS | DIASTOLIC BLOOD PRESSURE: 70 MMHG | SYSTOLIC BLOOD PRESSURE: 111 MMHG

## 2020-11-11 DIAGNOSIS — Z98.890 OTHER SPECIFIED POSTPROCEDURAL STATES: Chronic | ICD-10-CM

## 2020-11-11 DIAGNOSIS — Z98.89 OTHER SPECIFIED POSTPROCEDURAL STATES: Chronic | ICD-10-CM

## 2020-11-11 DIAGNOSIS — M34.9 SYSTEMIC SCLEROSIS, UNSPECIFIED: ICD-10-CM

## 2020-11-11 LAB
ALBUMIN SERPL ELPH-MCNC: 4.6 G/DL
ALP BLD-CCNC: 119 U/L
ALT SERPL-CCNC: 10 U/L
ANION GAP SERPL CALC-SCNC: 18 MMOL/L
AST SERPL-CCNC: 19 U/L
BASOPHILS # BLD AUTO: 0.03 K/UL
BASOPHILS NFR BLD AUTO: 0.5 %
BILIRUB SERPL-MCNC: 0.5 MG/DL
BUN SERPL-MCNC: 9 MG/DL
CALCIUM SERPL-MCNC: 10.3 MG/DL
CHLORIDE SERPL-SCNC: 106 MMOL/L
CK SERPL-CCNC: 108 U/L
CO2 SERPL-SCNC: 21 MMOL/L
CREAT SERPL-MCNC: 0.53 MG/DL
CRP SERPL-MCNC: <0.1 MG/DL
EOSINOPHIL # BLD AUTO: 0.13 K/UL
EOSINOPHIL NFR BLD AUTO: 2 %
ERYTHROCYTE [SEDIMENTATION RATE] IN BLOOD BY WESTERGREN METHOD: 14 MM/HR
GLUCOSE SERPL-MCNC: 94 MG/DL
HCG UR QL: NEGATIVE — SIGNIFICANT CHANGE UP
HCT VFR BLD CALC: 45.3 %
HGB BLD-MCNC: 13.5 G/DL
IMM GRANULOCYTES NFR BLD AUTO: 0.3 %
LDH SERPL-CCNC: 176 U/L
LYMPHOCYTES # BLD AUTO: 2.25 K/UL
LYMPHOCYTES NFR BLD AUTO: 34.6 %
MAN DIFF?: NORMAL
MCHC RBC-ENTMCNC: 25.5 PG
MCHC RBC-ENTMCNC: 29.8 GM/DL
MCV RBC AUTO: 85.5 FL
MONOCYTES # BLD AUTO: 0.52 K/UL
MONOCYTES NFR BLD AUTO: 8 %
NEUTROPHILS # BLD AUTO: 3.55 K/UL
NEUTROPHILS NFR BLD AUTO: 54.6 %
PLATELET # BLD AUTO: 161 K/UL
POTASSIUM SERPL-SCNC: 5.2 MMOL/L
PROT SERPL-MCNC: 7 G/DL
RBC # BLD: 5.3 M/UL
RBC # FLD: 14.4 %
SODIUM SERPL-SCNC: 145 MMOL/L
WBC # FLD AUTO: 6.5 K/UL

## 2020-11-11 PROCEDURE — 88305 TISSUE EXAM BY PATHOLOGIST: CPT | Mod: 26

## 2020-11-11 PROCEDURE — 88312 SPECIAL STAINS GROUP 1: CPT | Mod: 26

## 2020-11-11 PROCEDURE — 43239 EGD BIOPSY SINGLE/MULTIPLE: CPT | Mod: GC

## 2020-11-11 RX ORDER — SODIUM CHLORIDE 9 MG/ML
1000 INJECTION, SOLUTION INTRAVENOUS
Refills: 0 | Status: DISCONTINUED | OUTPATIENT
Start: 2020-11-11 | End: 2020-11-26

## 2020-11-11 RX ADMIN — SODIUM CHLORIDE 30 MILLILITER(S): 9 INJECTION, SOLUTION INTRAVENOUS at 16:00

## 2020-11-11 NOTE — ASU PATIENT PROFILE, ADULT - NS PRO TALK SOMEONE YN
TRANSFER - OUT REPORT:    Verbal report given to Ginny Cadena (name) on Connie Simental  being transferred to [facility] ED(unit) for change in patient condition(transfer to [facility])       Report consisted of patients Situation, Background, Assessment and   Recommendations(SBAR). Information from the following report(s) SBAR, ED Summary, Intake/Output, MAR, Recent Results and Cardiac Rhythm NSR was reviewed with the receiving nurse. Lines:   Peripheral IV 03/17/19 Right Antecubital (Active)   Site Assessment Clean, dry, & intact 3/17/2019 10:05 PM   Phlebitis Assessment 0 3/17/2019 10:05 PM   Infiltration Assessment 0 3/17/2019 10:05 PM   Dressing Status New 3/17/2019 10:05 PM   Hub Color/Line Status Pink 3/17/2019 10:05 PM        Opportunity for questions and clarification was provided.       Patient transported with:   Monitor   EMS no

## 2020-11-12 LAB
SARS-COV-2 IGG SERPL IA-ACNC: <0.1 INDEX
SARS-COV-2 IGG SERPL QL IA: NEGATIVE

## 2020-11-16 LAB — SURGICAL PATHOLOGY STUDY: SIGNIFICANT CHANGE UP

## 2020-11-24 ENCOUNTER — RESULT REVIEW (OUTPATIENT)
Age: 45
End: 2020-11-24

## 2020-11-24 LAB
CULTURE RESULTS: SIGNIFICANT CHANGE UP
SPECIMEN SOURCE: SIGNIFICANT CHANGE UP

## 2020-11-25 LAB — C DIFF TOX GENS STL QL NAA+PROBE: SIGNIFICANT CHANGE UP

## 2020-11-29 ENCOUNTER — APPOINTMENT (OUTPATIENT)
Dept: DISASTER EMERGENCY | Facility: CLINIC | Age: 45
End: 2020-11-29

## 2020-11-30 LAB — SARS-COV-2 N GENE NPH QL NAA+PROBE: NOT DETECTED

## 2020-12-03 ENCOUNTER — OUTPATIENT (OUTPATIENT)
Dept: OUTPATIENT SERVICES | Facility: HOSPITAL | Age: 45
LOS: 1 days | End: 2020-12-03

## 2020-12-03 ENCOUNTER — APPOINTMENT (OUTPATIENT)
Dept: GASTROENTEROLOGY | Facility: CLINIC | Age: 45
End: 2020-12-03

## 2020-12-03 ENCOUNTER — APPOINTMENT (OUTPATIENT)
Dept: PULMONOLOGY | Facility: CLINIC | Age: 45
End: 2020-12-03
Payer: MEDICAID

## 2020-12-03 VITALS — TEMPERATURE: 98.6 F | BODY MASS INDEX: 23.44 KG/M2 | HEART RATE: 88 BPM | OXYGEN SATURATION: 99 % | WEIGHT: 120 LBS

## 2020-12-03 DIAGNOSIS — Z98.890 OTHER SPECIFIED POSTPROCEDURAL STATES: Chronic | ICD-10-CM

## 2020-12-03 DIAGNOSIS — Z98.89 OTHER SPECIFIED POSTPROCEDURAL STATES: Chronic | ICD-10-CM

## 2020-12-03 PROCEDURE — 94010 BREATHING CAPACITY TEST: CPT

## 2020-12-03 PROCEDURE — 94729 DIFFUSING CAPACITY: CPT

## 2020-12-03 PROCEDURE — 99072 ADDL SUPL MATRL&STAF TM PHE: CPT

## 2020-12-03 PROCEDURE — 94726 PLETHYSMOGRAPHY LUNG VOLUMES: CPT

## 2020-12-03 NOTE — ASSESSMENT
[FreeTextEntry1] : IMPRESSION:\par #Heartburn: s/p EGD revealing mild reflux esophagitis, now improved with Famotidine. \par #Diarrhea: Resolved with avoiding certain food triggers. GI PCR negative. Likely lactose/other sugar intolerance\par \par PLAN: \par - Recommend Omeprazole 20mg qday, 30 minutes before breakfast (recommend switching from Famotidine given history of scleroderma)\par -Avoid food triggers\par \par RTC in 6 months.\par \par D/W Dr Rosas.

## 2020-12-03 NOTE — END OF VISIT
[] : Fellow [FreeTextEntry3] : As modified and discussed with patient\par MD [facility]\par Associate Professor of Medicine\par [facility]\par   [Time Spent: ___ minutes] : I have spent [unfilled] minutes of time on the encounter.

## 2020-12-03 NOTE — PHYSICAL EXAM
[General Appearance - Alert] : alert [General Appearance - In No Acute Distress] : in no acute distress [General Appearance - Well Nourished] : well nourished [Sclera] : the sclera and conjunctiva were normal [Extraocular Movements] : extraocular movements were intact [Exaggerated Use Of Accessory Muscles For Inspiration] : no accessory muscle use [Auscultation Breath Sounds / Voice Sounds] : lungs were clear to auscultation bilaterally [Heart Sounds] : normal S1 and S2 [Murmurs] : no murmurs [Heart Sounds Pericardial Friction Rub] : no pericardial rub [Bowel Sounds] : normal bowel sounds [Abdomen Soft] : soft [Abdomen Tenderness] : non-tender [Cervical Lymph Nodes Enlarged Posterior Bilaterally] : posterior cervical [Cervical Lymph Nodes Enlarged Anterior Bilaterally] : anterior cervical [Nail Clubbing] : no clubbing  or cyanosis of the fingernails [Motor Tone] : muscle strength and tone were normal [Skin Color & Pigmentation] : normal skin color and pigmentation [Skin Turgor] : normal skin turgor [] : no rash [Sensation] : the sensory exam was normal to light touch and pinprick [Motor Exam] : the motor exam was normal [Oriented To Time, Place, And Person] : oriented to person, place, and time [Affect] : the affect was normal

## 2020-12-03 NOTE — HISTORY OF PRESENT ILLNESS
[Home] : at home, [unfilled] , at the time of the visit. [Medical Office: ([facility])___] : at the medical office located in  [Verbal consent obtained from patient] : the patient, [unfilled] [de-identified] : Verbal consent for telephonic services was given on December 3rd, 2020, by the patient, Shilo White\par Telephonic appointment\par Patient at home, Physician at home\par Patient informed about reason for appointment and privacy risks associated with telephonic appointment\par Patient provided verbal consent for telephonic appointment today and had no administrative questions\par She does not have ability to do telehealth appointment at this time.\par  [de-identified] : 43 y/o F w/ Hx of scleroderma, hypothyroidism, ILD, right winged scapula, presents to follow up on EGD result.\par \par Pt presented in 09/2020 for one month history of RUQ sharp abd pain, associated with nausea after meals. Patient went to urgent care, have blood work including liver enzymes which were normal and US which was negative for gallstones. Pt then came to clinic in 09/2020 complaining of burning sensation that radiates diffusely, occurring after meals ass/w nausea. Patient had an EGD for further evaluation which showed 2 gastric polyps, gastritis, colonoscopy (for screening) showing diverticulosis in descending, transverse, and ascending colon. Path: Duodenum: Negative, Stomach: chronic active gastritis, negative for H pylori, polyp: superficial fragments of gastric oxyntic mucosa w reactive changes, GE junction: mild reflux esophagitis, mild chronic carditis. \par \par Pt states that her pain is significantly helped by Famotidine which she takes at night. \par Pt also had GI PCR checked for ongoing diarrhea that occurs 4-5 times a day. GI PCR is negative and pt thinks that her diarrhea is improved as well with continued dairy free products and avoiding fried food.

## 2020-12-12 ENCOUNTER — APPOINTMENT (OUTPATIENT)
Dept: MAMMOGRAPHY | Facility: CLINIC | Age: 45
End: 2020-12-12
Payer: MEDICAID

## 2020-12-12 ENCOUNTER — OUTPATIENT (OUTPATIENT)
Dept: OUTPATIENT SERVICES | Facility: HOSPITAL | Age: 45
LOS: 1 days | End: 2020-12-12
Payer: MEDICAID

## 2020-12-12 ENCOUNTER — RESULT REVIEW (OUTPATIENT)
Age: 45
End: 2020-12-12

## 2020-12-12 DIAGNOSIS — Z98.89 OTHER SPECIFIED POSTPROCEDURAL STATES: Chronic | ICD-10-CM

## 2020-12-12 DIAGNOSIS — Z00.8 ENCOUNTER FOR OTHER GENERAL EXAMINATION: ICD-10-CM

## 2020-12-12 DIAGNOSIS — Z98.890 OTHER SPECIFIED POSTPROCEDURAL STATES: Chronic | ICD-10-CM

## 2020-12-12 PROCEDURE — 77065 DX MAMMO INCL CAD UNI: CPT | Mod: 26,LT

## 2020-12-12 PROCEDURE — 77065 DX MAMMO INCL CAD UNI: CPT

## 2020-12-17 ENCOUNTER — RX RENEWAL (OUTPATIENT)
Age: 45
End: 2020-12-17

## 2020-12-18 ENCOUNTER — APPOINTMENT (OUTPATIENT)
Dept: UROLOGY | Facility: CLINIC | Age: 45
End: 2020-12-18

## 2020-12-22 ENCOUNTER — RX RENEWAL (OUTPATIENT)
Age: 45
End: 2020-12-22

## 2020-12-28 ENCOUNTER — NON-APPOINTMENT (OUTPATIENT)
Age: 45
End: 2020-12-28

## 2021-01-13 ENCOUNTER — APPOINTMENT (OUTPATIENT)
Dept: INTERNAL MEDICINE | Facility: CLINIC | Age: 46
End: 2021-01-13
Payer: MEDICAID

## 2021-01-13 ENCOUNTER — OUTPATIENT (OUTPATIENT)
Dept: OUTPATIENT SERVICES | Facility: HOSPITAL | Age: 46
LOS: 1 days | End: 2021-01-13

## 2021-01-13 ENCOUNTER — RESULT REVIEW (OUTPATIENT)
Age: 46
End: 2021-01-13

## 2021-01-13 VITALS
DIASTOLIC BLOOD PRESSURE: 68 MMHG | BODY MASS INDEX: 23.56 KG/M2 | HEART RATE: 86 BPM | WEIGHT: 120 LBS | HEIGHT: 60 IN | SYSTOLIC BLOOD PRESSURE: 116 MMHG

## 2021-01-13 VITALS — TEMPERATURE: 98.3 F

## 2021-01-13 DIAGNOSIS — J84.9 INTERSTITIAL PULMONARY DISEASE, UNSPECIFIED: ICD-10-CM

## 2021-01-13 DIAGNOSIS — R29.898 OTHER SYMPTOMS AND SIGNS INVOLVING THE MUSCULOSKELETAL SYSTEM: ICD-10-CM

## 2021-01-13 DIAGNOSIS — Z98.89 OTHER SPECIFIED POSTPROCEDURAL STATES: Chronic | ICD-10-CM

## 2021-01-13 DIAGNOSIS — F32.0 MAJOR DEPRESSIVE DISORDER, SINGLE EPISODE, MILD: ICD-10-CM

## 2021-01-13 DIAGNOSIS — Z00.00 ENCOUNTER FOR GENERAL ADULT MEDICAL EXAMINATION WITHOUT ABNORMAL FINDINGS: ICD-10-CM

## 2021-01-13 DIAGNOSIS — Z98.890 OTHER SPECIFIED POSTPROCEDURAL STATES: Chronic | ICD-10-CM

## 2021-01-13 DIAGNOSIS — Z01.818 ENCOUNTER FOR OTHER PREPROCEDURAL EXAMINATION: ICD-10-CM

## 2021-01-13 DIAGNOSIS — Z23 ENCOUNTER FOR IMMUNIZATION: ICD-10-CM

## 2021-01-13 DIAGNOSIS — M34.9 SYSTEMIC SCLEROSIS, UNSPECIFIED: ICD-10-CM

## 2021-01-13 DIAGNOSIS — E03.9 HYPOTHYROIDISM, UNSPECIFIED: ICD-10-CM

## 2021-01-13 DIAGNOSIS — N91.2 AMENORRHEA, UNSPECIFIED: ICD-10-CM

## 2021-01-13 DIAGNOSIS — R92.2 INCONCLUSIVE MAMMOGRAM: ICD-10-CM

## 2021-01-13 DIAGNOSIS — B35.1 TINEA UNGUIUM: ICD-10-CM

## 2021-01-13 DIAGNOSIS — M35.1 OTHER OVERLAP SYNDROMES: ICD-10-CM

## 2021-01-13 LAB
A1C WITH ESTIMATED AVERAGE GLUCOSE RESULT: 5.2 % — SIGNIFICANT CHANGE UP (ref 4–5.6)
ALBUMIN SERPL ELPH-MCNC: 4.6 G/DL — SIGNIFICANT CHANGE UP (ref 3.3–5)
ALP SERPL-CCNC: 111 U/L — SIGNIFICANT CHANGE UP (ref 40–120)
ALT FLD-CCNC: 13 U/L — SIGNIFICANT CHANGE UP (ref 4–33)
ANION GAP SERPL CALC-SCNC: 10 MMOL/L — SIGNIFICANT CHANGE UP (ref 7–14)
AST SERPL-CCNC: 22 U/L — SIGNIFICANT CHANGE UP (ref 4–32)
BASOPHILS # BLD AUTO: 0.05 K/UL — SIGNIFICANT CHANGE UP (ref 0–0.2)
BASOPHILS NFR BLD AUTO: 0.8 % — SIGNIFICANT CHANGE UP (ref 0–2)
BILIRUB SERPL-MCNC: 0.6 MG/DL — SIGNIFICANT CHANGE UP (ref 0.2–1.2)
BUN SERPL-MCNC: 12 MG/DL — SIGNIFICANT CHANGE UP (ref 7–23)
CALCIUM SERPL-MCNC: 9.9 MG/DL — SIGNIFICANT CHANGE UP (ref 8.4–10.5)
CHLORIDE SERPL-SCNC: 101 MMOL/L — SIGNIFICANT CHANGE UP (ref 98–107)
CHOLEST SERPL-MCNC: 192 MG/DL — SIGNIFICANT CHANGE UP
CO2 SERPL-SCNC: 27 MMOL/L — SIGNIFICANT CHANGE UP (ref 22–31)
CREAT SERPL-MCNC: 0.56 MG/DL — SIGNIFICANT CHANGE UP (ref 0.5–1.3)
EOSINOPHIL # BLD AUTO: 0.12 K/UL — SIGNIFICANT CHANGE UP (ref 0–0.5)
EOSINOPHIL NFR BLD AUTO: 1.8 % — SIGNIFICANT CHANGE UP (ref 0–6)
ESTIMATED AVERAGE GLUCOSE: 103 MG/DL — SIGNIFICANT CHANGE UP (ref 68–114)
GLUCOSE SERPL-MCNC: 83 MG/DL — SIGNIFICANT CHANGE UP (ref 70–99)
HCT VFR BLD CALC: 43.4 % — SIGNIFICANT CHANGE UP (ref 34.5–45)
HDLC SERPL-MCNC: 51 MG/DL — SIGNIFICANT CHANGE UP
HGB BLD-MCNC: 13.5 G/DL — SIGNIFICANT CHANGE UP (ref 11.5–15.5)
HIV 1+2 AB+HIV1 P24 AG SERPL QL IA: SIGNIFICANT CHANGE UP
IANC: 3.41 K/UL — SIGNIFICANT CHANGE UP (ref 1.5–8.5)
IMM GRANULOCYTES NFR BLD AUTO: 0.3 % — SIGNIFICANT CHANGE UP (ref 0–1.5)
LIPID PNL WITH DIRECT LDL SERPL: 122 MG/DL — HIGH
LYMPHOCYTES # BLD AUTO: 2.32 K/UL — SIGNIFICANT CHANGE UP (ref 1–3.3)
LYMPHOCYTES # BLD AUTO: 35.4 % — SIGNIFICANT CHANGE UP (ref 13–44)
MCHC RBC-ENTMCNC: 25.3 PG — LOW (ref 27–34)
MCHC RBC-ENTMCNC: 31.1 GM/DL — LOW (ref 32–36)
MCV RBC AUTO: 81.4 FL — SIGNIFICANT CHANGE UP (ref 80–100)
MONOCYTES # BLD AUTO: 0.63 K/UL — SIGNIFICANT CHANGE UP (ref 0–0.9)
MONOCYTES NFR BLD AUTO: 9.6 % — SIGNIFICANT CHANGE UP (ref 2–14)
NEUTROPHILS # BLD AUTO: 3.41 K/UL — SIGNIFICANT CHANGE UP (ref 1.8–7.4)
NEUTROPHILS NFR BLD AUTO: 52.1 % — SIGNIFICANT CHANGE UP (ref 43–77)
NON HDL CHOLESTEROL: 141 MG/DL — HIGH
NRBC # BLD: 0 /100 WBCS — SIGNIFICANT CHANGE UP
NRBC # FLD: 0 K/UL — SIGNIFICANT CHANGE UP
PLATELET # BLD AUTO: 181 K/UL — SIGNIFICANT CHANGE UP (ref 150–400)
POTASSIUM SERPL-MCNC: 4.3 MMOL/L — SIGNIFICANT CHANGE UP (ref 3.5–5.3)
POTASSIUM SERPL-SCNC: 4.3 MMOL/L — SIGNIFICANT CHANGE UP (ref 3.5–5.3)
PROT SERPL-MCNC: 8 G/DL — SIGNIFICANT CHANGE UP (ref 6–8.3)
RBC # BLD: 5.33 M/UL — HIGH (ref 3.8–5.2)
RBC # FLD: 14.1 % — SIGNIFICANT CHANGE UP (ref 10.3–14.5)
SODIUM SERPL-SCNC: 138 MMOL/L — SIGNIFICANT CHANGE UP (ref 135–145)
TRIGL SERPL-MCNC: 94 MG/DL — SIGNIFICANT CHANGE UP
TSH SERPL-MCNC: 0.27 UIU/ML — SIGNIFICANT CHANGE UP (ref 0.27–4.2)
WBC # BLD: 6.55 K/UL — SIGNIFICANT CHANGE UP (ref 3.8–10.5)
WBC # FLD AUTO: 6.55 K/UL — SIGNIFICANT CHANGE UP (ref 3.8–10.5)

## 2021-01-13 PROCEDURE — 99396 PREV VISIT EST AGE 40-64: CPT

## 2021-01-13 NOTE — HEALTH RISK ASSESSMENT
[Fair] :  ~his/her~ mood as fair [FreeTextEntry1] : dense breast [de-identified] : Gastroenterology, rheumatology [HIV Test offered] : HIV Test offered [None] : None [With Family] : lives with family [] :  [Sexually Active] : sexually active [Reports changes in hearing] : Reports no changes in hearing [Reports changes in vision] : Reports no changes in vision [Reports changes in dental health] : Reports no changes in dental health [MammogramComments] : D [PapSmearComments] : Due in 2022 as per Gyne's note from 7/20 [de-identified] : uses condoms to prevent pregnancy

## 2021-01-13 NOTE — PHYSICAL EXAM
[No Acute Distress] : no acute distress [Well Developed] : well developed [Well-Appearing] : well-appearing [Normal Voice/Communication] : normal voice/communication [Normal Outer Ear/Nose] : the outer ears and nose were normal in appearance [Normal TMs] : both tympanic membranes were normal [No Lymphadenopathy] : no lymphadenopathy [No Respiratory Distress] : no respiratory distress  [Clear to Auscultation] : lungs were clear to auscultation bilaterally [Normal Rate] : normal rate  [Regular Rhythm] : with a regular rhythm [Normal S1, S2] : normal S1 and S2 [No Murmur] : no murmur heard [No Abdominal Bruit] : a ~M bruit was not heard ~T in the abdomen [Pedal Pulses Present] : the pedal pulses are present [No Edema] : there was no peripheral edema [Normal Appearance] : normal in appearance [No Nipple Discharge] : no nipple discharge [No Axillary Lymphadenopathy] : no axillary lymphadenopathy [Soft] : abdomen soft [Non Tender] : non-tender [Non-distended] : non-distended [No Masses] : no abdominal mass palpated [No HSM] : no HSM [Normal Bowel Sounds] : normal bowel sounds [No Hernias] : no hernias [Speech Grossly Normal] : speech grossly normal [Normal Mood] : the mood was normal [de-identified] : Offered chaperone for physical, patient accepted, ANGEL Mellos available for chaperoning per patient's request.

## 2021-01-13 NOTE — REVIEW OF SYSTEMS
[Fever] : no fever [Chills] : no chills [Vision Problems] : no vision problems [Earache] : no earache [Hearing Loss] : no hearing loss [Chest Pain] : no chest pain [Palpitations] : no palpitations [Lower Ext Edema] : no lower extremity edema [Shortness Of Breath] : no shortness of breath [Wheezing] : no wheezing [Abdominal Pain] : abdominal pain [Constipation] : no constipation [Diarrhea] : diarrhea [Vomiting] : no vomiting [Heartburn] : heartburn [Dysuria] : no dysuria [Joint Pain] : joint pain [Nail Changes] : nail changes [Suicidal] : not suicidal [Depression] : depression [de-identified] : toe nail lifting

## 2021-01-13 NOTE — HISTORY OF PRESENT ILLNESS
[FreeTextEntry1] : annual check up [de-identified] : Ms. NICOLAS LAL is a 45-year-old female with scleroderma, hypothyroidism, interstitial lung disease and right winged scapula presents for her annual check up with questions about her recent mammogram.  Patient states she was told by the radiologist that she has dense breast on mammogram and to discuss with her PMD next steps.  Patient does occasionally experience breast pain.  She is also without menses for several months which has been ongoing now for about a year.  She states she had spoken about this with her gynecologist who did not think it was related to menopause.  \par \par She continues to have abdominal pains intermittently that is unchanged for which she is seeing gastroenterology.  Her diarrhea is not much of an issue as long as she avoids certain foods.  \par \par She is otherwise doing well, wondering about the Covid vaccine and if she would be an appropriate candidate or if there are any contraindications due to her medications that she is taking for her scleroderma. \par

## 2021-01-14 LAB — HBV SURFACE AB SER-ACNC: SIGNIFICANT CHANGE UP

## 2021-01-22 ENCOUNTER — APPOINTMENT (OUTPATIENT)
Dept: UROLOGY | Facility: CLINIC | Age: 46
End: 2021-01-22
Payer: MEDICAID

## 2021-01-22 PROCEDURE — 99213 OFFICE O/P EST LOW 20 MIN: CPT | Mod: 95

## 2021-01-22 NOTE — HISTORY OF PRESENT ILLNESS
[Home] : at home, [unfilled] , at the time of the visit. [Other Location: e.g. Home (Enter Location, City,State)___] : at [unfilled] [Verbal consent obtained from patient] : the patient, [unfilled] [FreeTextEntry1] : Pt.has a history of stone passage. \par Hassent LL but only 9 days ago.\par She needs an US.\par Pt.c/o occasional pain in rt.flank.....particularly if she feels "gassy". The pain starts in the\par  epigastrium and radiates to the rt.side.\par Will review US results and LL on Feb cosult after US.

## 2021-01-27 ENCOUNTER — APPOINTMENT (OUTPATIENT)
Dept: ULTRASOUND IMAGING | Facility: CLINIC | Age: 46
End: 2021-01-27
Payer: MEDICAID

## 2021-01-27 ENCOUNTER — OUTPATIENT (OUTPATIENT)
Dept: OUTPATIENT SERVICES | Facility: HOSPITAL | Age: 46
LOS: 1 days | End: 2021-01-27
Payer: MEDICAID

## 2021-01-27 DIAGNOSIS — R59.0 LOCALIZED ENLARGED LYMPH NODES: ICD-10-CM

## 2021-01-27 DIAGNOSIS — Z98.890 OTHER SPECIFIED POSTPROCEDURAL STATES: Chronic | ICD-10-CM

## 2021-01-27 DIAGNOSIS — Z98.89 OTHER SPECIFIED POSTPROCEDURAL STATES: Chronic | ICD-10-CM

## 2021-01-27 PROCEDURE — 76536 US EXAM OF HEAD AND NECK: CPT

## 2021-01-27 PROCEDURE — 76536 US EXAM OF HEAD AND NECK: CPT | Mod: 26

## 2021-02-03 ENCOUNTER — APPOINTMENT (OUTPATIENT)
Dept: RHEUMATOLOGY | Facility: CLINIC | Age: 46
End: 2021-02-03
Payer: MEDICAID

## 2021-02-03 VITALS
WEIGHT: 121 LBS | DIASTOLIC BLOOD PRESSURE: 91 MMHG | OXYGEN SATURATION: 98 % | HEIGHT: 60 IN | TEMPERATURE: 97.3 F | HEART RATE: 102 BPM | SYSTOLIC BLOOD PRESSURE: 131 MMHG | BODY MASS INDEX: 23.75 KG/M2

## 2021-02-03 DIAGNOSIS — M95.8 OTHER SPECIFIED ACQUIRED DEFORMITIES OF MUSCULOSKELETAL SYSTEM: ICD-10-CM

## 2021-02-03 PROCEDURE — 99215 OFFICE O/P EST HI 40 MIN: CPT

## 2021-02-03 PROCEDURE — 99072 ADDL SUPL MATRL&STAF TM PHE: CPT

## 2021-02-04 PROBLEM — M95.8 WINGED SCAPULA, RIGHT: Status: ACTIVE | Noted: 2017-08-15

## 2021-02-08 ENCOUNTER — RX RENEWAL (OUTPATIENT)
Age: 46
End: 2021-02-08

## 2021-02-12 ENCOUNTER — APPOINTMENT (OUTPATIENT)
Dept: UROLOGY | Facility: CLINIC | Age: 46
End: 2021-02-12
Payer: MEDICAID

## 2021-02-12 ENCOUNTER — RX RENEWAL (OUTPATIENT)
Age: 46
End: 2021-02-12

## 2021-02-12 ENCOUNTER — OUTPATIENT (OUTPATIENT)
Dept: OUTPATIENT SERVICES | Facility: HOSPITAL | Age: 46
LOS: 1 days | End: 2021-02-12
Payer: MEDICAID

## 2021-02-12 DIAGNOSIS — Z98.890 OTHER SPECIFIED POSTPROCEDURAL STATES: Chronic | ICD-10-CM

## 2021-02-12 DIAGNOSIS — Z98.89 OTHER SPECIFIED POSTPROCEDURAL STATES: Chronic | ICD-10-CM

## 2021-02-12 DIAGNOSIS — R35.0 FREQUENCY OF MICTURITION: ICD-10-CM

## 2021-02-12 PROCEDURE — 76775 US EXAM ABDO BACK WALL LIM: CPT

## 2021-02-12 PROCEDURE — 99442: CPT | Mod: 25

## 2021-02-12 PROCEDURE — 76775 US EXAM ABDO BACK WALL LIM: CPT | Mod: 26

## 2021-02-16 DIAGNOSIS — Z87.442 PERSONAL HISTORY OF URINARY CALCULI: ICD-10-CM

## 2021-02-22 ENCOUNTER — APPOINTMENT (OUTPATIENT)
Dept: DERMATOLOGY | Facility: CLINIC | Age: 46
End: 2021-02-22
Payer: MEDICAID

## 2021-02-22 ENCOUNTER — RX RENEWAL (OUTPATIENT)
Age: 46
End: 2021-02-22

## 2021-02-22 ENCOUNTER — NON-APPOINTMENT (OUTPATIENT)
Age: 46
End: 2021-02-22

## 2021-02-22 DIAGNOSIS — B35.1 TINEA UNGUIUM: ICD-10-CM

## 2021-02-22 DIAGNOSIS — L60.3 NAIL DYSTROPHY: ICD-10-CM

## 2021-02-22 DIAGNOSIS — L81.9 DISORDER OF PIGMENTATION, UNSPECIFIED: ICD-10-CM

## 2021-02-22 PROCEDURE — 99204 OFFICE O/P NEW MOD 45 MIN: CPT

## 2021-02-22 PROCEDURE — 99072 ADDL SUPL MATRL&STAF TM PHE: CPT

## 2021-02-22 RX ORDER — CICLOPIROX 80 MG/ML
8 SOLUTION TOPICAL
Qty: 1 | Refills: 6 | Status: ACTIVE | COMMUNITY
Start: 2021-02-22 | End: 1900-01-01

## 2021-02-22 RX ORDER — HYDROQUINONE AND OCTINOXATE 20; 20 MG/G; MG/G
2 CREAM TOPICAL
Qty: 1 | Refills: 1 | Status: ACTIVE | COMMUNITY
Start: 2021-02-22 | End: 1900-01-01

## 2021-02-24 ENCOUNTER — APPOINTMENT (OUTPATIENT)
Dept: INTERNAL MEDICINE | Facility: CLINIC | Age: 46
End: 2021-02-24

## 2021-02-25 ENCOUNTER — RX RENEWAL (OUTPATIENT)
Age: 46
End: 2021-02-25

## 2021-03-10 ENCOUNTER — TRANSCRIPTION ENCOUNTER (OUTPATIENT)
Age: 46
End: 2021-03-10

## 2021-03-30 ENCOUNTER — APPOINTMENT (OUTPATIENT)
Dept: RHEUMATOLOGY | Facility: CLINIC | Age: 46
End: 2021-03-30
Payer: MEDICAID

## 2021-03-30 PROCEDURE — 99214 OFFICE O/P EST MOD 30 MIN: CPT | Mod: 95

## 2021-03-30 NOTE — HISTORY OF PRESENT ILLNESS
[Home] : at home, [unfilled] , at the time of the visit. [Medical Office: ([facility])___] : at the medical office located in  [Verbal consent obtained from patient] : the patient, [unfilled] [de-identified] : The last visit was in February, 2021 [FreeTextEntry1] : Interval history :\par ----------------------------- \par = on CellCept 500 mg BID  since feb 3, Plaquenil 200 mg \par = in last 10 days noted worsening of SOB - more on exercises/ climbing stairs,  no cough , no fever, no chills- initially though that had COVID after had a headache for 1-2 days and was tested for COVID19- by PCR and rapid- both negative\par - no joint pain/swelling now, but at the time of acute episode - had increased joint pain\par

## 2021-03-30 NOTE — ASSESSMENT
[FreeTextEntry1] : \par Diffuse Systemic Scleroderma with positive TIMO and Raynaud's\par Overlap myositis, now with right winging scapula and restriction of right shoulder abduction - no improvement \par ILD, s/p Cytoxan, on Cellcept - worse since decreased dose of Cellcept\par PFTs 12/2020 reviewed\par increased joint pain\par history of nephrolithiasis - had obstructive renal stone \par \par = labs \par = increase CellCept to 1g a day\par = continue HCQ \par = repeat PFT and may need repeat CT chest \par \par \par RTO 2-4 weeks. \par

## 2021-04-05 ENCOUNTER — TRANSCRIPTION ENCOUNTER (OUTPATIENT)
Age: 46
End: 2021-04-05

## 2021-05-23 ENCOUNTER — RX RENEWAL (OUTPATIENT)
Age: 46
End: 2021-05-23

## 2021-06-12 ENCOUNTER — APPOINTMENT (OUTPATIENT)
Dept: DISASTER EMERGENCY | Facility: CLINIC | Age: 46
End: 2021-06-12

## 2021-06-13 LAB — SARS-COV-2 N GENE NPH QL NAA+PROBE: NOT DETECTED

## 2021-06-16 ENCOUNTER — APPOINTMENT (OUTPATIENT)
Dept: PULMONOLOGY | Facility: CLINIC | Age: 46
End: 2021-06-16
Payer: MEDICAID

## 2021-06-16 VITALS — BODY MASS INDEX: 24.41 KG/M2 | TEMPERATURE: 98 F | HEART RATE: 87 BPM | OXYGEN SATURATION: 99 % | WEIGHT: 125 LBS

## 2021-06-16 PROCEDURE — 94010 BREATHING CAPACITY TEST: CPT

## 2021-06-16 PROCEDURE — 94729 DIFFUSING CAPACITY: CPT

## 2021-06-16 PROCEDURE — 94726 PLETHYSMOGRAPHY LUNG VOLUMES: CPT

## 2021-06-30 ENCOUNTER — APPOINTMENT (OUTPATIENT)
Dept: RHEUMATOLOGY | Facility: CLINIC | Age: 46
End: 2021-06-30
Payer: MEDICAID

## 2021-06-30 VITALS
HEART RATE: 78 BPM | HEIGHT: 60 IN | WEIGHT: 125 LBS | TEMPERATURE: 97.9 F | OXYGEN SATURATION: 97 % | SYSTOLIC BLOOD PRESSURE: 125 MMHG | BODY MASS INDEX: 24.54 KG/M2 | DIASTOLIC BLOOD PRESSURE: 84 MMHG

## 2021-06-30 PROCEDURE — 99215 OFFICE O/P EST HI 40 MIN: CPT

## 2021-06-30 RX ORDER — NIFEDIPINE 60 MG/1
60 TABLET, EXTENDED RELEASE ORAL
Qty: 30 | Refills: 1 | Status: DISCONTINUED | COMMUNITY
Start: 2020-12-22 | End: 2021-06-30

## 2021-06-30 RX ORDER — PANTOPRAZOLE 40 MG/1
40 TABLET, DELAYED RELEASE ORAL DAILY
Qty: 60 | Refills: 1 | Status: DISCONTINUED | COMMUNITY
Start: 2020-09-24 | End: 2021-06-30

## 2021-07-01 LAB
ALBUMIN SERPL ELPH-MCNC: 4.7 G/DL
ALP BLD-CCNC: 118 U/L
ALT SERPL-CCNC: 19 U/L
ANION GAP SERPL CALC-SCNC: 11 MMOL/L
APPEARANCE: CLEAR
AST SERPL-CCNC: 22 U/L
BACTERIA: NEGATIVE
BASOPHILS # BLD AUTO: 0.05 K/UL
BASOPHILS NFR BLD AUTO: 0.5 %
BILIRUB SERPL-MCNC: 0.4 MG/DL
BILIRUBIN URINE: NEGATIVE
BLOOD URINE: ABNORMAL
BUN SERPL-MCNC: 9 MG/DL
C3 SERPL-MCNC: 117 MG/DL
C4 SERPL-MCNC: 36 MG/DL
CALCIUM SERPL-MCNC: 9.9 MG/DL
CHLORIDE SERPL-SCNC: 101 MMOL/L
CK SERPL-CCNC: 149 U/L
CO2 SERPL-SCNC: 25 MMOL/L
COLOR: NORMAL
COVID-19 SPIKE DOMAIN ANTIBODY INTERPRETATION: POSITIVE
CREAT SERPL-MCNC: 0.56 MG/DL
CREAT SPEC-SCNC: 20 MG/DL
CREAT/PROT UR: 0.3 RATIO
CRP SERPL-MCNC: <3 MG/L
ENA SS-A AB SER IA-ACNC: <0.2 AL
ENA SS-B AB SER IA-ACNC: <0.2 AL
EOSINOPHIL # BLD AUTO: 0.17 K/UL
EOSINOPHIL NFR BLD AUTO: 1.8 %
GLUCOSE QUALITATIVE U: NEGATIVE
GLUCOSE SERPL-MCNC: 90 MG/DL
HBV CORE IGG+IGM SER QL: NONREACTIVE
HBV CORE IGM SER QL: NONREACTIVE
HBV SURFACE AB SER QL: NONREACTIVE
HBV SURFACE AG SER QL: NONREACTIVE
HCT VFR BLD CALC: 44.8 %
HCV AB SER QL: NONREACTIVE
HCV S/CO RATIO: 0.13 S/CO
HGB BLD-MCNC: 13.8 G/DL
HYALINE CASTS: 0 /LPF
IMM GRANULOCYTES NFR BLD AUTO: 0.3 %
KETONES URINE: NEGATIVE
LDH SERPL-CCNC: 196 U/L
LEUKOCYTE ESTERASE URINE: NEGATIVE
LYMPHOCYTES # BLD AUTO: 2.84 K/UL
LYMPHOCYTES NFR BLD AUTO: 30.4 %
MAN DIFF?: NORMAL
MCHC RBC-ENTMCNC: 25.7 PG
MCHC RBC-ENTMCNC: 30.8 GM/DL
MCV RBC AUTO: 83.6 FL
MICROSCOPIC-UA: NORMAL
MONOCYTES # BLD AUTO: 0.95 K/UL
MONOCYTES NFR BLD AUTO: 10.2 %
NEUTROPHILS # BLD AUTO: 5.31 K/UL
NEUTROPHILS NFR BLD AUTO: 56.8 %
NITRITE URINE: NEGATIVE
PH URINE: 6
PLATELET # BLD AUTO: 185 K/UL
POTASSIUM SERPL-SCNC: 4.6 MMOL/L
PROT SERPL-MCNC: 7.3 G/DL
PROT UR-MCNC: 5 MG/DL
PROTEIN URINE: NORMAL
RBC # BLD: 5.36 M/UL
RBC # FLD: 15.2 %
RED BLOOD CELLS URINE: 20 /HPF
SARS-COV-2 AB SERPL IA-ACNC: 0.83 U/ML
SODIUM SERPL-SCNC: 137 MMOL/L
SPECIFIC GRAVITY URINE: 1.01
SQUAMOUS EPITHELIAL CELLS: 5 /HPF
UROBILINOGEN URINE: NORMAL
WBC # FLD AUTO: 9.35 K/UL
WHITE BLOOD CELLS URINE: 5 /HPF

## 2021-07-02 LAB
M TB IFN-G BLD-IMP: POSITIVE
QUANTIFERON TB PLUS MITOGEN MINUS NIL: 9.64 IU/ML
QUANTIFERON TB PLUS NIL: 0.06 IU/ML
QUANTIFERON TB PLUS TB1 MINUS NIL: 1.28 IU/ML
QUANTIFERON TB PLUS TB2 MINUS NIL: 1.15 IU/ML

## 2021-07-06 ENCOUNTER — RX RENEWAL (OUTPATIENT)
Age: 46
End: 2021-07-06

## 2021-07-12 ENCOUNTER — RX RENEWAL (OUTPATIENT)
Age: 46
End: 2021-07-12

## 2021-07-14 ENCOUNTER — APPOINTMENT (OUTPATIENT)
Dept: INTERNAL MEDICINE | Facility: CLINIC | Age: 46
End: 2021-07-14

## 2021-07-15 ENCOUNTER — APPOINTMENT (OUTPATIENT)
Dept: RADIOLOGY | Facility: CLINIC | Age: 46
End: 2021-07-15
Payer: MEDICAID

## 2021-07-15 ENCOUNTER — OUTPATIENT (OUTPATIENT)
Dept: OUTPATIENT SERVICES | Facility: HOSPITAL | Age: 46
LOS: 1 days | End: 2021-07-15
Payer: MEDICAID

## 2021-07-15 DIAGNOSIS — Z98.890 OTHER SPECIFIED POSTPROCEDURAL STATES: Chronic | ICD-10-CM

## 2021-07-15 DIAGNOSIS — Z98.89 OTHER SPECIFIED POSTPROCEDURAL STATES: Chronic | ICD-10-CM

## 2021-07-15 DIAGNOSIS — J84.9 INTERSTITIAL PULMONARY DISEASE, UNSPECIFIED: ICD-10-CM

## 2021-07-15 PROCEDURE — 71046 X-RAY EXAM CHEST 2 VIEWS: CPT

## 2021-07-15 PROCEDURE — 71046 X-RAY EXAM CHEST 2 VIEWS: CPT | Mod: 26

## 2021-07-17 ENCOUNTER — TRANSCRIPTION ENCOUNTER (OUTPATIENT)
Age: 46
End: 2021-07-17

## 2021-07-19 ENCOUNTER — NON-APPOINTMENT (OUTPATIENT)
Age: 46
End: 2021-07-19

## 2021-07-21 ENCOUNTER — APPOINTMENT (OUTPATIENT)
Dept: INTERNAL MEDICINE | Facility: CLINIC | Age: 46
End: 2021-07-21
Payer: MEDICAID

## 2021-07-21 ENCOUNTER — OUTPATIENT (OUTPATIENT)
Dept: OUTPATIENT SERVICES | Facility: HOSPITAL | Age: 46
LOS: 1 days | End: 2021-07-21

## 2021-07-21 VITALS
HEART RATE: 75 BPM | SYSTOLIC BLOOD PRESSURE: 124 MMHG | HEIGHT: 60 IN | BODY MASS INDEX: 24.54 KG/M2 | OXYGEN SATURATION: 98 % | DIASTOLIC BLOOD PRESSURE: 82 MMHG | WEIGHT: 125 LBS

## 2021-07-21 VITALS — TEMPERATURE: 97.7 F

## 2021-07-21 DIAGNOSIS — Z98.89 OTHER SPECIFIED POSTPROCEDURAL STATES: Chronic | ICD-10-CM

## 2021-07-21 DIAGNOSIS — Z98.890 OTHER SPECIFIED POSTPROCEDURAL STATES: Chronic | ICD-10-CM

## 2021-07-21 PROCEDURE — 99213 OFFICE O/P EST LOW 20 MIN: CPT

## 2021-07-23 NOTE — PHYSICAL EXAM
[No Acute Distress] : no acute distress [Normal Voice/Communication] : normal voice/communication [No Lymphadenopathy] : no lymphadenopathy [Soft] : abdomen soft [Non Tender] : non-tender [Non-distended] : non-distended [Normal Bowel Sounds] : normal bowel sounds

## 2021-07-23 NOTE — REVIEW OF SYSTEMS
[Chest Pain] : no chest pain [Palpitations] : no palpitations [Abdominal Pain] : abdominal pain [Nausea] : no nausea [Constipation] : no constipation [Diarrhea] : diarrhea [Vomiting] : no vomiting [Dysuria] : no dysuria [Hematuria] : no hematuria [Negative] : Constitutional

## 2021-07-23 NOTE — HISTORY OF PRESENT ILLNESS
[FreeTextEntry1] : Urgent care follow up for abdominal pain [de-identified] : Patient presents after recent urgent care visit for abdominal pain, started about a week ago.  Pain was severe and she was concerned that it was return of her kidney stones that she had a over a year ago. She is also experiencing difficulty holding up her head, finding that her neck is weak and wonders if she can go for physical therapy to strengthen her neck muscle. \par \par She is also having right knee pain for which she is looking for physical therapy to help her.\par \par Teary eyes and sneezing probably due to allergies.

## 2021-07-26 DIAGNOSIS — R10.31 RIGHT LOWER QUADRANT PAIN: ICD-10-CM

## 2021-07-28 ENCOUNTER — APPOINTMENT (OUTPATIENT)
Dept: RHEUMATOLOGY | Facility: CLINIC | Age: 46
End: 2021-07-28
Payer: MEDICAID

## 2021-07-28 VITALS
SYSTOLIC BLOOD PRESSURE: 120 MMHG | WEIGHT: 123 LBS | HEIGHT: 60 IN | TEMPERATURE: 97.1 F | RESPIRATION RATE: 16 BRPM | DIASTOLIC BLOOD PRESSURE: 88 MMHG | OXYGEN SATURATION: 96 % | HEART RATE: 90 BPM | BODY MASS INDEX: 24.15 KG/M2

## 2021-07-28 DIAGNOSIS — M48.062 SPINAL STENOSIS, LUMBAR REGION WITH NEUROGENIC CLAUDICATION: ICD-10-CM

## 2021-07-28 PROCEDURE — 99214 OFFICE O/P EST MOD 30 MIN: CPT

## 2021-08-16 ENCOUNTER — RX RENEWAL (OUTPATIENT)
Age: 46
End: 2021-08-16

## 2021-08-17 ENCOUNTER — APPOINTMENT (OUTPATIENT)
Dept: MRI IMAGING | Facility: CLINIC | Age: 46
End: 2021-08-17
Payer: MEDICAID

## 2021-08-17 ENCOUNTER — OUTPATIENT (OUTPATIENT)
Dept: OUTPATIENT SERVICES | Facility: HOSPITAL | Age: 46
LOS: 1 days | End: 2021-08-17
Payer: MEDICAID

## 2021-08-17 ENCOUNTER — APPOINTMENT (OUTPATIENT)
Dept: CT IMAGING | Facility: CLINIC | Age: 46
End: 2021-08-17
Payer: MEDICAID

## 2021-08-17 DIAGNOSIS — Z98.890 OTHER SPECIFIED POSTPROCEDURAL STATES: Chronic | ICD-10-CM

## 2021-08-17 DIAGNOSIS — Z98.89 OTHER SPECIFIED POSTPROCEDURAL STATES: Chronic | ICD-10-CM

## 2021-08-17 DIAGNOSIS — R10.31 RIGHT LOWER QUADRANT PAIN: ICD-10-CM

## 2021-08-17 PROCEDURE — 72148 MRI LUMBAR SPINE W/O DYE: CPT

## 2021-08-17 PROCEDURE — 74176 CT ABD & PELVIS W/O CONTRAST: CPT

## 2021-08-17 PROCEDURE — 72148 MRI LUMBAR SPINE W/O DYE: CPT | Mod: 26

## 2021-08-17 PROCEDURE — 74176 CT ABD & PELVIS W/O CONTRAST: CPT | Mod: 26

## 2021-11-12 ENCOUNTER — RX RENEWAL (OUTPATIENT)
Age: 46
End: 2021-11-12

## 2021-12-14 ENCOUNTER — RX RENEWAL (OUTPATIENT)
Age: 46
End: 2021-12-14

## 2022-01-08 ENCOUNTER — RX RENEWAL (OUTPATIENT)
Age: 47
End: 2022-01-08

## 2022-01-25 ENCOUNTER — APPOINTMENT (OUTPATIENT)
Dept: RHEUMATOLOGY | Facility: CLINIC | Age: 47
End: 2022-01-25
Payer: MEDICAID

## 2022-01-25 VITALS
WEIGHT: 120 LBS | DIASTOLIC BLOOD PRESSURE: 97 MMHG | SYSTOLIC BLOOD PRESSURE: 137 MMHG | HEIGHT: 60 IN | HEART RATE: 78 BPM | BODY MASS INDEX: 23.56 KG/M2 | TEMPERATURE: 97.6 F | OXYGEN SATURATION: 97 %

## 2022-01-25 PROCEDURE — 99214 OFFICE O/P EST MOD 30 MIN: CPT

## 2022-01-26 ENCOUNTER — APPOINTMENT (OUTPATIENT)
Dept: INTERNAL MEDICINE | Facility: CLINIC | Age: 47
End: 2022-01-26
Payer: MEDICAID

## 2022-01-26 ENCOUNTER — OUTPATIENT (OUTPATIENT)
Dept: OUTPATIENT SERVICES | Facility: HOSPITAL | Age: 47
LOS: 1 days | End: 2022-01-26

## 2022-01-26 ENCOUNTER — RESULT REVIEW (OUTPATIENT)
Age: 47
End: 2022-01-26

## 2022-01-26 VITALS
HEIGHT: 60 IN | DIASTOLIC BLOOD PRESSURE: 82 MMHG | HEART RATE: 79 BPM | OXYGEN SATURATION: 98 % | BODY MASS INDEX: 23.56 KG/M2 | WEIGHT: 120 LBS | SYSTOLIC BLOOD PRESSURE: 134 MMHG

## 2022-01-26 VITALS — TEMPERATURE: 97.4 F

## 2022-01-26 DIAGNOSIS — Z01.818 ENCOUNTER FOR OTHER PREPROCEDURAL EXAMINATION: ICD-10-CM

## 2022-01-26 DIAGNOSIS — Z98.890 OTHER SPECIFIED POSTPROCEDURAL STATES: Chronic | ICD-10-CM

## 2022-01-26 DIAGNOSIS — Z98.89 OTHER SPECIFIED POSTPROCEDURAL STATES: Chronic | ICD-10-CM

## 2022-01-26 DIAGNOSIS — M54.2 CERVICALGIA: ICD-10-CM

## 2022-01-26 DIAGNOSIS — Z11.59 ENCOUNTER FOR SCREENING FOR OTHER VIRAL DISEASES: ICD-10-CM

## 2022-01-26 PROCEDURE — 99396 PREV VISIT EST AGE 40-64: CPT

## 2022-01-26 RX ORDER — IBUPROFEN 600 MG/1
600 TABLET, FILM COATED ORAL
Qty: 20 | Refills: 0 | Status: ACTIVE | COMMUNITY
Start: 2021-10-13

## 2022-01-26 RX ORDER — AMOXICILLIN 500 MG/1
500 CAPSULE ORAL
Qty: 28 | Refills: 0 | Status: COMPLETED | COMMUNITY
Start: 2021-10-11

## 2022-01-26 RX ORDER — ACETAMINOPHEN AND CODEINE 300; 30 MG/1; MG/1
300-30 TABLET ORAL
Qty: 20 | Refills: 0 | Status: COMPLETED | COMMUNITY
Start: 2021-10-13

## 2022-01-27 DIAGNOSIS — Z00.00 ENCOUNTER FOR GENERAL ADULT MEDICAL EXAMINATION WITHOUT ABNORMAL FINDINGS: ICD-10-CM

## 2022-01-27 DIAGNOSIS — E03.9 HYPOTHYROIDISM, UNSPECIFIED: ICD-10-CM

## 2022-01-27 DIAGNOSIS — M54.2 CERVICALGIA: ICD-10-CM

## 2022-01-27 DIAGNOSIS — M35.1 OTHER OVERLAP SYNDROMES: ICD-10-CM

## 2022-01-27 DIAGNOSIS — M75.00 ADHESIVE CAPSULITIS OF UNSPECIFIED SHOULDER: ICD-10-CM

## 2022-01-27 DIAGNOSIS — R14.0 ABDOMINAL DISTENSION (GASEOUS): ICD-10-CM

## 2022-01-27 PROBLEM — Z01.818 PREOP TESTING: Status: RESOLVED | Noted: 2020-11-29 | Resolved: 2022-01-27

## 2022-01-27 LAB
ALBUMIN SERPL ELPH-MCNC: 4.9 G/DL
ALP BLD-CCNC: 110 U/L
ALT SERPL-CCNC: 15 U/L
ANION GAP SERPL CALC-SCNC: 13 MMOL/L
APPEARANCE: CLEAR
AST SERPL-CCNC: 20 U/L
BACTERIA: NEGATIVE
BASOPHILS # BLD AUTO: 0.05 K/UL
BASOPHILS NFR BLD AUTO: 0.7 %
BILIRUB SERPL-MCNC: 0.4 MG/DL
BILIRUBIN URINE: NEGATIVE
BLOOD URINE: NORMAL
BUN SERPL-MCNC: 13 MG/DL
CALCIUM SERPL-MCNC: 9.8 MG/DL
CHLORIDE SERPL-SCNC: 105 MMOL/L
CK SERPL-CCNC: 120 U/L
CO2 SERPL-SCNC: 25 MMOL/L
COLOR: NORMAL
COVID-19 NUCLEOCAPSID  GAM ANTIBODY INTERPRETATION: NEGATIVE
COVID-19 SPIKE DOMAIN ANTIBODY INTERPRETATION: POSITIVE
CREAT SERPL-MCNC: 0.46 MG/DL
CREAT SPEC-SCNC: 50 MG/DL
CREAT/PROT UR: 0.1 RATIO
CRP SERPL-MCNC: <3 MG/L
EOSINOPHIL # BLD AUTO: 0.1 K/UL
EOSINOPHIL NFR BLD AUTO: 1.4 %
ERYTHROCYTE [SEDIMENTATION RATE] IN BLOOD BY WESTERGREN METHOD: 21 MM/HR
GLUCOSE QUALITATIVE U: NEGATIVE
GLUCOSE SERPL-MCNC: 92 MG/DL
HCT VFR BLD CALC: 44.9 %
HGB BLD-MCNC: 13.6 G/DL
HYALINE CASTS: 2 /LPF
IMM GRANULOCYTES NFR BLD AUTO: 0.3 %
KETONES URINE: NEGATIVE
LEUKOCYTE ESTERASE URINE: ABNORMAL
LYMPHOCYTES # BLD AUTO: 2.47 K/UL
LYMPHOCYTES NFR BLD AUTO: 35.5 %
MAN DIFF?: NORMAL
MCHC RBC-ENTMCNC: 25 PG
MCHC RBC-ENTMCNC: 30.3 GM/DL
MCV RBC AUTO: 82.7 FL
MICROSCOPIC-UA: NORMAL
MONOCYTES # BLD AUTO: 0.69 K/UL
MONOCYTES NFR BLD AUTO: 9.9 %
NEUTROPHILS # BLD AUTO: 3.62 K/UL
NEUTROPHILS NFR BLD AUTO: 52.2 %
NITRITE URINE: NEGATIVE
PH URINE: 6
PLATELET # BLD AUTO: 192 K/UL
POTASSIUM SERPL-SCNC: 4.7 MMOL/L
PROT SERPL-MCNC: 7.3 G/DL
PROT UR-MCNC: 6 MG/DL
PROTEIN URINE: NEGATIVE
RBC # BLD: 5.43 M/UL
RBC # FLD: 14.6 %
RED BLOOD CELLS URINE: 2 /HPF
SARS-COV-2 AB SERPL IA-ACNC: 4.91 U/ML
SARS-COV-2 AB SERPL QL IA: 0.09 INDEX
SODIUM SERPL-SCNC: 144 MMOL/L
SPECIFIC GRAVITY URINE: 1.02
SQUAMOUS EPITHELIAL CELLS: 4 /HPF
URINE CYTOLOGY: NORMAL
UROBILINOGEN URINE: NORMAL
WBC # FLD AUTO: 6.95 K/UL
WHITE BLOOD CELLS URINE: 2 /HPF

## 2022-01-27 NOTE — REVIEW OF SYSTEMS
[Shortness Of Breath] : shortness of breath [Dyspnea on Exertion] : dyspnea on exertion [Constipation] : no constipation [Diarrhea] : diarrhea [Heartburn] : no heartburn [Negative] : Genitourinary [FreeTextEntry6] : unchanged [FreeTextEntry7] : see HPI [FreeTextEntry9] : see HPI

## 2022-01-27 NOTE — PHYSICAL EXAM
[Normal Sclera/Conjunctiva] : normal sclera/conjunctiva [No Lymphadenopathy] : no lymphadenopathy [No Edema] : there was no peripheral edema [Normal] : affect was normal and insight and judgment were intact

## 2022-01-27 NOTE — HEALTH RISK ASSESSMENT
[With Family] : lives with family [] :  [Reports changes in hearing] : Reports no changes in hearing [Reports changes in vision] : Reports no changes in vision [Reports changes in dental health] : Reports no changes in dental health [Smoke Detector] : smoke detector [Carbon Monoxide Detector] : carbon monoxide detector [Seat Belt] :  uses seat belt

## 2022-01-27 NOTE — HISTORY OF PRESENT ILLNESS
[FreeTextEntry1] : Here for annual check up [de-identified] : Patient presents for her annual check up with continued concerns regarding her abdominal bloating and discomfort.  She states that it has been going on for some time now.  Reports that her last menses was about 9 months ago.  She wondered about her recent imaging of her abdomen and wants to know what are next steps.  \par \par She continues to have shoulder pains and stiffness including neck pain.  She is interested in having a renewal of her physical therapy.\par \par She has recently seen her Rheumatologist who has scheduled her to get a special kind of booster for Covid vaccination this weekend.  She is not ready to take her flu shot today.

## 2022-01-30 ENCOUNTER — OUTPATIENT (OUTPATIENT)
Dept: OUTPATIENT SERVICES | Facility: HOSPITAL | Age: 47
LOS: 1 days | End: 2022-01-30

## 2022-01-30 ENCOUNTER — APPOINTMENT (OUTPATIENT)
Age: 47
End: 2022-01-30

## 2022-01-30 VITALS
HEART RATE: 75 BPM | RESPIRATION RATE: 18 BRPM | DIASTOLIC BLOOD PRESSURE: 78 MMHG | TEMPERATURE: 98 F | SYSTOLIC BLOOD PRESSURE: 109 MMHG | OXYGEN SATURATION: 97 %

## 2022-01-30 VITALS
DIASTOLIC BLOOD PRESSURE: 90 MMHG | TEMPERATURE: 97 F | OXYGEN SATURATION: 100 % | SYSTOLIC BLOOD PRESSURE: 149 MMHG | RESPIRATION RATE: 18 BRPM | HEART RATE: 104 BPM

## 2022-01-30 DIAGNOSIS — Z98.890 OTHER SPECIFIED POSTPROCEDURAL STATES: Chronic | ICD-10-CM

## 2022-01-30 DIAGNOSIS — M05.40 RHEUMATOID MYOPATHY WITH RHEUMATOID ARTHRITIS OF UNSPECIFIED SITE: ICD-10-CM

## 2022-01-30 DIAGNOSIS — Z98.89 OTHER SPECIFIED POSTPROCEDURAL STATES: Chronic | ICD-10-CM

## 2022-02-09 ENCOUNTER — APPOINTMENT (OUTPATIENT)
Dept: INTERNAL MEDICINE | Facility: CLINIC | Age: 47
End: 2022-02-09

## 2022-02-12 LAB — OB PNL STL IA: NEGATIVE — SIGNIFICANT CHANGE UP

## 2022-02-14 ENCOUNTER — RX RENEWAL (OUTPATIENT)
Age: 47
End: 2022-02-14

## 2022-02-17 ENCOUNTER — APPOINTMENT (OUTPATIENT)
Dept: GASTROENTEROLOGY | Facility: CLINIC | Age: 47
End: 2022-02-17

## 2022-02-28 ENCOUNTER — RX RENEWAL (OUTPATIENT)
Age: 47
End: 2022-02-28

## 2022-03-01 ENCOUNTER — RX RENEWAL (OUTPATIENT)
Age: 47
End: 2022-03-01

## 2022-03-01 RX ORDER — SIMETHICONE 80 MG/1
80 TABLET, CHEWABLE ORAL
Qty: 360 | Refills: 1 | Status: COMPLETED | COMMUNITY
Start: 2020-08-26 | End: 2022-03-01

## 2022-03-01 RX ORDER — CALCIUM CARBONATE/VITAMIN D3 600 MG-10
600-400 TABLET ORAL
Qty: 90 | Refills: 1 | Status: COMPLETED | COMMUNITY
Start: 2019-12-02 | End: 2022-03-01

## 2022-03-08 ENCOUNTER — OUTPATIENT (OUTPATIENT)
Dept: OUTPATIENT SERVICES | Facility: HOSPITAL | Age: 47
LOS: 1 days | End: 2022-03-08

## 2022-03-08 ENCOUNTER — RESULT REVIEW (OUTPATIENT)
Age: 47
End: 2022-03-08

## 2022-03-08 ENCOUNTER — APPOINTMENT (OUTPATIENT)
Dept: OBGYN | Facility: HOSPITAL | Age: 47
End: 2022-03-08
Payer: MEDICAID

## 2022-03-08 VITALS
BODY MASS INDEX: 24.15 KG/M2 | DIASTOLIC BLOOD PRESSURE: 89 MMHG | TEMPERATURE: 97.9 F | HEIGHT: 60 IN | HEART RATE: 77 BPM | WEIGHT: 123 LBS | SYSTOLIC BLOOD PRESSURE: 133 MMHG

## 2022-03-08 DIAGNOSIS — Z98.890 OTHER SPECIFIED POSTPROCEDURAL STATES: Chronic | ICD-10-CM

## 2022-03-08 DIAGNOSIS — Z98.89 OTHER SPECIFIED POSTPROCEDURAL STATES: Chronic | ICD-10-CM

## 2022-03-08 PROCEDURE — 99213 OFFICE O/P EST LOW 20 MIN: CPT | Mod: GC

## 2022-03-09 DIAGNOSIS — Z01.419 ENCOUNTER FOR GYNECOLOGICAL EXAMINATION (GENERAL) (ROUTINE) WITHOUT ABNORMAL FINDINGS: ICD-10-CM

## 2022-03-09 LAB — HPV HIGH+LOW RISK DNA PNL CVX: SIGNIFICANT CHANGE UP

## 2022-03-10 NOTE — PHYSICAL EXAM
[Appropriately responsive] : appropriately responsive [Alert] : alert [No Acute Distress] : no acute distress [Soft] : soft [Non-tender] : non-tender [Non-distended] : non-distended [Examination Of The Breasts] : a normal appearance [Breast Palpation Diffuse Fibrous Tissue Bilateral] : fibrocystic changes [No Masses] : no breast masses were palpable [Labia Majora] : normal [Labia Minora] : normal [No Bleeding] : There was no active vaginal bleeding [Normal] : normal [Uterine Adnexae] : non-palpable [Tenderness] : nontender [Enlarged ___ wks] : not enlarged [Mass ___ cm] : no uterine mass was palpated [FreeTextEntry5] : cervix atrophic and very anteverted/anteflexed with difficult Pap smear

## 2022-03-10 NOTE — DISCUSSION/SUMMARY
[FreeTextEntry1] : 47 yo  LMP  presents for routine well-woman exam. Patient with amenorrhea x7 months and associated perimenopausal symptoms but overall coping well and has no acute complaints. \par \par #HCM \par - F/u Pap smear/HPV\par - Mammogram referral provided\par - Pt has GI appt  for colonoscopy for abd bloating \par - Using condoms for contraception\par - DEXA scan () - low bone mass \par - Declines STI screening today. Denies symptoms and states being in a monogamous relationship with her  for 15 yrs \par - F/u in 1 yr for routine annual well-woman exam or sooner as needed. \par \par E Demirel PGY4 \par d/w Dr. Lindsey

## 2022-03-10 NOTE — HISTORY OF PRESENT ILLNESS
[FreeTextEntry1] : 45 yo , LMP 22 presents for her annual exam. She reports amenorrhea [id number] and associated hot flushes, sweats. Mood stable and reports coping well overall with her perimenopausal symptoms. She is sexually active in a monogamous relationship with her  for 15 yrs and uses condoms for contraception. Denies vaginal bleeding, vaginal discharge, dyspareunia, dysuria, hematuria, abdominal pain, N/V. She lives at home with her children and , feels safe at home. \par \par ObHx:  (), C/S (), mTOP x1  \par GynHx: denies h/o AUB, fibroids, cysts, endometriosis, STIs, abnormal Pap smears \par PMHx: myositis, scleroderma, hypothyroid, raynauds\par PSHx: C/S x1\par Social hx: denies tobacco, alcohol, recreational drugs\par FHx: denies significant Fhx of GI/GYN cancers \par Meds: hydroxychloroquine, synthroid, vit D, vit B6, salicept

## 2022-03-13 LAB — CYTOLOGY SPEC DOC CYTO: SIGNIFICANT CHANGE UP

## 2022-03-31 ENCOUNTER — APPOINTMENT (OUTPATIENT)
Dept: RADIOLOGY | Facility: CLINIC | Age: 47
End: 2022-03-31
Payer: MEDICAID

## 2022-03-31 ENCOUNTER — OUTPATIENT (OUTPATIENT)
Dept: OUTPATIENT SERVICES | Facility: HOSPITAL | Age: 47
LOS: 1 days | End: 2022-03-31
Payer: MEDICAID

## 2022-03-31 DIAGNOSIS — M35.1 OTHER OVERLAP SYNDROMES: ICD-10-CM

## 2022-03-31 DIAGNOSIS — Z98.890 OTHER SPECIFIED POSTPROCEDURAL STATES: Chronic | ICD-10-CM

## 2022-03-31 DIAGNOSIS — Z98.89 OTHER SPECIFIED POSTPROCEDURAL STATES: Chronic | ICD-10-CM

## 2022-03-31 PROCEDURE — 77080 DXA BONE DENSITY AXIAL: CPT | Mod: 26

## 2022-03-31 PROCEDURE — 77080 DXA BONE DENSITY AXIAL: CPT

## 2022-04-02 ENCOUNTER — APPOINTMENT (OUTPATIENT)
Dept: MAMMOGRAPHY | Facility: CLINIC | Age: 47
End: 2022-04-02
Payer: MEDICAID

## 2022-04-02 ENCOUNTER — OUTPATIENT (OUTPATIENT)
Dept: OUTPATIENT SERVICES | Facility: HOSPITAL | Age: 47
LOS: 1 days | End: 2022-04-02
Payer: MEDICAID

## 2022-04-02 ENCOUNTER — RESULT REVIEW (OUTPATIENT)
Age: 47
End: 2022-04-02

## 2022-04-02 DIAGNOSIS — Z98.89 OTHER SPECIFIED POSTPROCEDURAL STATES: Chronic | ICD-10-CM

## 2022-04-02 DIAGNOSIS — Z01.419 ENCOUNTER FOR GYNECOLOGICAL EXAMINATION (GENERAL) (ROUTINE) WITHOUT ABNORMAL FINDINGS: ICD-10-CM

## 2022-04-02 DIAGNOSIS — Z98.890 OTHER SPECIFIED POSTPROCEDURAL STATES: Chronic | ICD-10-CM

## 2022-04-02 DIAGNOSIS — Z00.00 ENCOUNTER FOR GENERAL ADULT MEDICAL EXAMINATION WITHOUT ABNORMAL FINDINGS: ICD-10-CM

## 2022-04-02 PROCEDURE — 77067 SCR MAMMO BI INCL CAD: CPT | Mod: 26

## 2022-04-02 PROCEDURE — 77067 SCR MAMMO BI INCL CAD: CPT

## 2022-04-02 PROCEDURE — 77063 BREAST TOMOSYNTHESIS BI: CPT | Mod: 26

## 2022-04-02 PROCEDURE — 77063 BREAST TOMOSYNTHESIS BI: CPT

## 2022-04-11 PROBLEM — Z11.59 SCREENING FOR VIRAL DISEASE: Status: RESOLVED | Noted: 2020-07-01 | Resolved: 2022-04-11

## 2022-04-11 PROBLEM — Z11.59 SCREENING FOR VIRAL DISEASE: Status: RESOLVED | Noted: 2020-11-10 | Resolved: 2022-01-27

## 2022-04-14 DIAGNOSIS — M85.80 OTHER SPECIFIED DISORDERS OF BONE DENSITY AND STRUCTURE, UNSPECIFIED SITE: ICD-10-CM

## 2022-04-26 ENCOUNTER — APPOINTMENT (OUTPATIENT)
Dept: RHEUMATOLOGY | Facility: CLINIC | Age: 47
End: 2022-04-26

## 2022-04-28 ENCOUNTER — OUTPATIENT (OUTPATIENT)
Dept: OUTPATIENT SERVICES | Facility: HOSPITAL | Age: 47
LOS: 1 days | End: 2022-04-28

## 2022-04-28 ENCOUNTER — APPOINTMENT (OUTPATIENT)
Dept: GASTROENTEROLOGY | Facility: CLINIC | Age: 47
End: 2022-04-28
Payer: MEDICAID

## 2022-04-28 VITALS
WEIGHT: 122.5 LBS | OXYGEN SATURATION: 99 % | BODY MASS INDEX: 24.05 KG/M2 | DIASTOLIC BLOOD PRESSURE: 80 MMHG | HEIGHT: 60 IN | SYSTOLIC BLOOD PRESSURE: 130 MMHG | TEMPERATURE: 96.5 F | HEART RATE: 90 BPM

## 2022-04-28 DIAGNOSIS — R12 HEARTBURN: ICD-10-CM

## 2022-04-28 DIAGNOSIS — R14.0 ABDOMINAL DISTENSION (GASEOUS): ICD-10-CM

## 2022-04-28 DIAGNOSIS — Z98.890 OTHER SPECIFIED POSTPROCEDURAL STATES: Chronic | ICD-10-CM

## 2022-04-28 DIAGNOSIS — R19.7 DIARRHEA, UNSPECIFIED: ICD-10-CM

## 2022-04-28 DIAGNOSIS — Z98.89 OTHER SPECIFIED POSTPROCEDURAL STATES: Chronic | ICD-10-CM

## 2022-04-28 PROCEDURE — 99214 OFFICE O/P EST MOD 30 MIN: CPT | Mod: GC

## 2022-04-28 NOTE — ASSESSMENT
[FreeTextEntry1] : Ms. White is a 45 yo F with scleroderma and ILD on cellcept/PLQ c/b chronic diarrhea (~3-4 loose/soft BMs per day, brown) wit negative celiac studies, hypothyroidism, interna hemorrhoids, diverticulosis, gastritis/reflux esophagitis (EGD 11/2020, negative HP) on famotidine 20 qhs who presents to clinic today for screening colonoscopy but has had one 11/2020 with plans to repeat in 2030, current describing worsening reflux and bloating.\par \par # GERD\par # Chronic diarrhea c/b bloating, negative celiac studies - will need to r/o sibo considering her sx and h/o scleroderma and its potential affect on gut motility\par # Colonoscopy -  11/2020 with diverticulosis and internal hemorrhoids, repeat 2030\par # Dark stools - occurred several mos ago with resolution and normal Hgb subsequently; no indication for pursing endoscopic evaluation at this time, but if recurs, will consider\par \par Plan\par - Famotidine before lunch; can take before bed if needed in addition to this\par - Start omeprazole 20 mg po to be taken 30 minutes before dinner\par - Breath test\par - Dietary/lifestyle education\par - Avoid NSAIDs\par - Repeat colonoscopy 2030\par \par Further recommendations/workup pending response to PPI\par \par Preliminary note until signed by Attending\par \par Ruth Ross MD\par GI/hepatology fellow, PGYV

## 2022-04-28 NOTE — REVIEW OF SYSTEMS
[As Noted in HPI] : as noted in HPI [Fever] : no fever [Chills] : no chills [Sore Throat] : no sore throat [Hoarseness] : no hoarseness [Chest Pain] : no chest pain [Shortness Of Breath] : no shortness of breath

## 2022-04-28 NOTE — END OF VISIT
[] : Fellow [FreeTextEntry3] : #GERD\par #Chronic loose stools with previously unremarkable workup, including endoscopic evaluation and celiac testing\par #Scleroderma\par #CRC screening: UTD, colonoscopy 2020\par \par --Add Omeprazole as previously discussed; patient to take it 30-60min prior to dinner as patient states will have difficulty timing around all her other medications. Pending response to therapy, will discuss tapering off/discontinuing in future if able. \par --Famotidine as needed, especially prior to lunch given frequent post-prandial symptoms\par --SIBO testing for bloating and loose stools, especially given scleroderma history\par --Monitor for recurrent dark stools; prior CBC stable and dark stools have not recurred\par --Avoid NSAIDs\par --Colonoscopy due 2030 for screening\par

## 2022-04-28 NOTE — PHYSICAL EXAM
[General Appearance - Alert] : alert [General Appearance - In No Acute Distress] : in no acute distress [Sclera] : the sclera and conjunctiva were normal [Extraocular Movements] : extraocular movements were intact [Hearing Threshold Finger Rub Not [place]] : hearing was normal [Neck Appearance] : the appearance of the neck was normal [Exaggerated Use Of Accessory Muscles For Inspiration] : no accessory muscle use [Abdomen Soft] : soft [Abdomen Tenderness] : non-tender [] : no hepato-splenomegaly [Abdomen Mass (___ Cm)] : no abdominal mass palpated [Involuntary Movements] : no involuntary movements were seen [No Focal Deficits] : no focal deficits [Oriented To Time, Place, And Person] : oriented to person, place, and time [Impaired Insight] : insight and judgment were intact [Affect] : the affect was normal [FreeTextEntry1] : Normal rate

## 2022-04-28 NOTE — HISTORY OF PRESENT ILLNESS
[de-identified] : Ms. White is a 45 yo F with scleroderma and ILD on cellcept/PLQ c/b chronic diarrhea (~3-4 loose/soft BMs per day, brown) wit negative celiac studies, hypothyroidism, interna hemorrhoids, diverticulosis, gastritis/reflux esophagitis (EGD 11/2020, negative HP) on famotidine 20 qhs who presents to clinic today for screening colonoscopy but has had one 11/2020 with plans to repeat in 2030, current describing worsening reflux and bloating.\par \par Patient was last seen 12/2020 with plans to dc famotidine and start omeprazole but she says she never was started on it. Says her sx have gotten worse and describes reflux/bloating at all hours of the day, reflux sx are worse after lunch and at night just before bed. She currently takes famoitidine 20 mg after dinner and her other medications early AM. No abdominal pain, f/c, n/v. No red blood in the stool, but states she noted dark stools about 3+ mos prior x4, subsequent labs showed Hgb13.6. Takes aleve maybe 1-2x month.\par \par

## 2022-05-20 ENCOUNTER — APPOINTMENT (OUTPATIENT)
Dept: RHEUMATOLOGY | Facility: CLINIC | Age: 47
End: 2022-05-20

## 2022-05-27 ENCOUNTER — APPOINTMENT (OUTPATIENT)
Dept: RHEUMATOLOGY | Facility: CLINIC | Age: 47
End: 2022-05-27
Payer: MEDICAID

## 2022-05-27 VITALS
DIASTOLIC BLOOD PRESSURE: 88 MMHG | TEMPERATURE: 97.7 F | OXYGEN SATURATION: 95 % | HEIGHT: 60 IN | SYSTOLIC BLOOD PRESSURE: 125 MMHG | HEART RATE: 77 BPM | BODY MASS INDEX: 23.16 KG/M2 | WEIGHT: 118 LBS

## 2022-05-27 PROCEDURE — 99214 OFFICE O/P EST MOD 30 MIN: CPT | Mod: GC

## 2022-05-31 ENCOUNTER — RX RENEWAL (OUTPATIENT)
Age: 47
End: 2022-05-31

## 2022-06-06 LAB
ALBUMIN SERPL ELPH-MCNC: 5 G/DL
ALP BLD-CCNC: 117 U/L
ALT SERPL-CCNC: 25 U/L
AMYLASE/CREAT SERPL: 90 U/L
ANION GAP SERPL CALC-SCNC: 13 MMOL/L
AST SERPL-CCNC: 28 U/L
BASOPHILS # BLD AUTO: 0.03 K/UL
BASOPHILS NFR BLD AUTO: 0.4 %
BILIRUB SERPL-MCNC: 0.6 MG/DL
BUN SERPL-MCNC: 10 MG/DL
CALCIUM SERPL-MCNC: 9.7 MG/DL
CHLORIDE SERPL-SCNC: 102 MMOL/L
CK SERPL-CCNC: 118 U/L
CO2 SERPL-SCNC: 26 MMOL/L
CREAT SERPL-MCNC: 0.49 MG/DL
CRP SERPL-MCNC: <3 MG/L
EGFR: 118 ML/MIN/1.73M2
EOSINOPHIL # BLD AUTO: 0.16 K/UL
EOSINOPHIL NFR BLD AUTO: 2 %
ERYTHROCYTE [SEDIMENTATION RATE] IN BLOOD BY WESTERGREN METHOD: 9 MM/HR
FERRITIN SERPL-MCNC: 37 NG/ML
GLUCOSE SERPL-MCNC: 87 MG/DL
HCT VFR BLD CALC: 45.7 %
HGB BLD-MCNC: 13.9 G/DL
IMM GRANULOCYTES NFR BLD AUTO: 0.4 %
LDH SERPL-CCNC: 199 U/L
LYMPHOCYTES # BLD AUTO: 2.54 K/UL
LYMPHOCYTES NFR BLD AUTO: 31.3 %
MAN DIFF?: NORMAL
MCHC RBC-ENTMCNC: 24.7 PG
MCHC RBC-ENTMCNC: 30.4 GM/DL
MCV RBC AUTO: 81.2 FL
MONOCYTES # BLD AUTO: 0.68 K/UL
MONOCYTES NFR BLD AUTO: 8.4 %
MYOGLOBIN SERPL-MCNC: 47 NG/ML
NEUTROPHILS # BLD AUTO: 4.68 K/UL
NEUTROPHILS NFR BLD AUTO: 57.5 %
PLATELET # BLD AUTO: 197 K/UL
POTASSIUM SERPL-SCNC: 4.8 MMOL/L
PROT SERPL-MCNC: 7.7 G/DL
RBC # BLD: 5.63 M/UL
RBC # FLD: 14.4 %
SODIUM SERPL-SCNC: 141 MMOL/L
WBC # FLD AUTO: 8.12 K/UL

## 2022-06-07 ENCOUNTER — APPOINTMENT (OUTPATIENT)
Dept: CT IMAGING | Facility: CLINIC | Age: 47
End: 2022-06-07
Payer: MEDICAID

## 2022-06-07 ENCOUNTER — OUTPATIENT (OUTPATIENT)
Dept: OUTPATIENT SERVICES | Facility: HOSPITAL | Age: 47
LOS: 1 days | End: 2022-06-07
Payer: MEDICAID

## 2022-06-07 DIAGNOSIS — J84.9 INTERSTITIAL PULMONARY DISEASE, UNSPECIFIED: ICD-10-CM

## 2022-06-07 DIAGNOSIS — Z98.89 OTHER SPECIFIED POSTPROCEDURAL STATES: Chronic | ICD-10-CM

## 2022-06-07 DIAGNOSIS — Z98.890 OTHER SPECIFIED POSTPROCEDURAL STATES: Chronic | ICD-10-CM

## 2022-06-07 PROCEDURE — 71250 CT THORAX DX C-: CPT | Mod: 26

## 2022-06-07 PROCEDURE — 71250 CT THORAX DX C-: CPT

## 2022-06-21 LAB — SARS-COV-2 N GENE NPH QL NAA+PROBE: NOT DETECTED

## 2022-06-24 ENCOUNTER — APPOINTMENT (OUTPATIENT)
Dept: PULMONOLOGY | Facility: CLINIC | Age: 47
End: 2022-06-24
Payer: MEDICAID

## 2022-06-24 VITALS
HEIGHT: 59 IN | DIASTOLIC BLOOD PRESSURE: 90 MMHG | HEART RATE: 84 BPM | BODY MASS INDEX: 24.19 KG/M2 | SYSTOLIC BLOOD PRESSURE: 124 MMHG | WEIGHT: 120 LBS | TEMPERATURE: 98.2 F

## 2022-06-24 PROCEDURE — 94726 PLETHYSMOGRAPHY LUNG VOLUMES: CPT

## 2022-06-24 PROCEDURE — 94010 BREATHING CAPACITY TEST: CPT

## 2022-06-24 PROCEDURE — 94729 DIFFUSING CAPACITY: CPT

## 2022-07-11 ENCOUNTER — RX RENEWAL (OUTPATIENT)
Age: 47
End: 2022-07-11

## 2022-07-27 ENCOUNTER — OUTPATIENT (OUTPATIENT)
Dept: OUTPATIENT SERVICES | Facility: HOSPITAL | Age: 47
LOS: 1 days | End: 2022-07-27

## 2022-07-27 ENCOUNTER — APPOINTMENT (OUTPATIENT)
Dept: INTERNAL MEDICINE | Facility: CLINIC | Age: 47
End: 2022-07-27

## 2022-07-27 VITALS
HEART RATE: 97 BPM | HEIGHT: 59 IN | BODY MASS INDEX: 23.79 KG/M2 | WEIGHT: 118 LBS | TEMPERATURE: 96.6 F | SYSTOLIC BLOOD PRESSURE: 102 MMHG | DIASTOLIC BLOOD PRESSURE: 70 MMHG | OXYGEN SATURATION: 99 %

## 2022-07-27 DIAGNOSIS — Z98.890 OTHER SPECIFIED POSTPROCEDURAL STATES: Chronic | ICD-10-CM

## 2022-07-27 DIAGNOSIS — Z98.89 OTHER SPECIFIED POSTPROCEDURAL STATES: Chronic | ICD-10-CM

## 2022-07-27 DIAGNOSIS — R13.10 DYSPHAGIA, UNSPECIFIED: ICD-10-CM

## 2022-07-27 PROCEDURE — 99214 OFFICE O/P EST MOD 30 MIN: CPT

## 2022-07-27 RX ORDER — ALENDRONATE SODIUM 70 MG/1
70 TABLET ORAL
Qty: 4 | Refills: 5 | Status: DISCONTINUED | COMMUNITY
Start: 2022-04-14 | End: 2022-07-27

## 2022-07-29 DIAGNOSIS — R13.10 DYSPHAGIA, UNSPECIFIED: ICD-10-CM

## 2022-07-29 DIAGNOSIS — J84.9 INTERSTITIAL PULMONARY DISEASE, UNSPECIFIED: ICD-10-CM

## 2022-07-29 DIAGNOSIS — M81.0 AGE-RELATED OSTEOPOROSIS WITHOUT CURRENT PATHOLOGICAL FRACTURE: ICD-10-CM

## 2022-07-29 DIAGNOSIS — E03.9 HYPOTHYROIDISM, UNSPECIFIED: ICD-10-CM

## 2022-07-29 DIAGNOSIS — R12 HEARTBURN: ICD-10-CM

## 2022-07-29 NOTE — PHYSICAL EXAM
[Normal] : no acute distress, well nourished, well developed and well-appearing [Normal Oropharynx] : the oropharynx was normal [Normal TMs] : both tympanic membranes were normal [No Respiratory Distress] : no respiratory distress  [Clear to Auscultation] : lungs were clear to auscultation bilaterally [Supple] : supple [de-identified] : submental lymph nodes palpable

## 2022-07-29 NOTE — HISTORY OF PRESENT ILLNESS
[FreeTextEntry1] : follow up chronic diseases [de-identified] : Patient presents for follow up of her chronic diseases.with concerns regarding a sore throat. She states a few weeks ago she developed a sore throat. Since that time it does not seem to have resolved completely, sometimes gets sore throat, sometimes itchy. \par \par Nephew is very young had a cold cough, and that's how she got the sore throat.\par \par 2 weeks sore throat improved but feels her glands are swollen\par \par Gas and heartburn still very bad

## 2022-07-29 NOTE — REVIEW OF SYSTEMS
[Sore Throat] : sore throat [Negative] : Constitutional [Earache] : no earache [Nasal Discharge] : no nasal discharge [Postnasal Drip] : no postnasal drip [Shortness Of Breath] : no shortness of breath

## 2022-08-01 ENCOUNTER — RX RENEWAL (OUTPATIENT)
Age: 47
End: 2022-08-01

## 2022-08-17 NOTE — H&P PST ADULT - VENOUS THROMBOEMBOLISM BMI
8/17/2022    CHIEF COMPLAINT:    Chief Complaint   Patient presents with   • Medicare Wellness Visit   • Physical       HISTORY OF PRESENT ILLNESS:  Dee Root presents for her routine physical exam.  She has concerns about headache that she has had for the past couple of months.  We have tried treating it several times with no significant improvement.  Her sleep, wake and eating schedules are much more consistent now and her headache has not improved.    She is also concerned about a lump on the left side of her neck.  She has a previous smoker who smoked for almost her entire life and quit about 5 years ago.  She notes no difficulty chewing or swallowing.    She has a long history of abdominal pain and complains of chronic abdominal pain at this time as well.  Her daughter reports she is moving her bowels daily with no difficulty.    Her daughter also notes that her dementia is worsening.  She notes symptoms such as increased symptoms with sundowning, hallucinations, increase confabulation, and sleeping more.  Her mother has been sleeping through the night without difficulty.  She is calm and not irritable.  There has been no violence or anger displayed since her move to her daughter's house.    Health Maintenance   Topic Date Due   • DM/CKD Microalbumin  Never done   • Shingles Vaccine (1 of 2) Never done   • Hepatitis B Vaccine (1 of 3 - Risk 3-dose series) Never done   • COVID-19 Vaccine (4 - Booster for Moderna series) 04/01/2022   • Traditional Medicare- Medicare Wellness Visit  04/08/2022   • Colorectal Cancer Risk - Colonoscopy  06/28/2022   • Influenza Vaccine (1) 09/01/2022   • DM/CKD GFR  07/19/2023   • DTaP/Tdap/Td Vaccine (2 - Td or Tdap) 01/03/2027   • Osteoporosis Screening  Completed   • Pneumococcal Vaccine 65+  Completed   • Meningococcal Vaccine  Aged Out   • HPV Vaccine  Aged Out        Patient Active Problem List   Diagnosis   • Essential hypertension   • Pure hypercholesterolemia    • FATTY LIVER   • GERD WITH ESOPHAGEAL STRICTURE   • Generalized anxiety disorder   • (HFpEF) heart failure with preserved ejection fraction (CMS/HCC)   • HX OF  LUMBAR RADICULOPATHY   • History of tobacco abuse   • LEFT INGUINAL HERNIA   • Incisional hernia without mention of obstruction or gangrene   • Primary pulmonary hypertension (CMS/HCC)   • Punctate keratitis   • Senile cataract, unspecified   •  constipation   • Nonvascular pulmonary hypertension   • LBP (low back pain)   • Diastolic dysfunction   • CAD (coronary artery disease)   • Nocturnal hypoxia   • Major depressive disorder, recurrent episode, mild (CMS/HCC)   • Hyperparathyroidism (CMS/HCC)   • History of pulmonary embolism   • Peripheral edema   • Late onset Alzheimer's dementia without behavioral disturbance (CMS/HCC)   • Dense breasts   • Moderate tricuspid insufficiency   • Insomnia due to medical condition   • Chronic intractable headache   • Caregiver with fatigue       Past Medical History:   Diagnosis Date   • Abdominal pain    • Abdominal pain, LLQ    • Anxiety state, unspecified    • Arthritis    • Back pain    • Benign neoplasm of colon 10/14/10    tubular adenoma   • Blood clot associated with vein wall inflammation    • Chronic pain    • Coronary artery disease    • Depression    • Esophageal reflux    • fatty liver 1/03   • Gastritis 06/28/2017   • Hiatal hernia 06/28/2017   • Incisional hernia without mention of obstruction or gangrene 6/16/06   • Inguinal hernia without mention of obstruction or gangrene, unilateral or unspecified, (not specified as recurrent) 6/16/06   • Major depressive disorder, recurrent episode, mild (CMS/HCC) 9/6/2013   • Memory loss    • Peptic ulcer, unspecified site, unspecified as acute or chronic, without mention of hemorrhage, perforation, or obstruction 7/95    Peptic Ulcer Disease   • Primary pulmonary hypertension (CMS/HCC) 2006    moderate noted on echo   • Pulmonary embolism without acute cor  pulmonale (CMS/HCC) 4/15/2016    Plan to anticoagulate on Xarelto for 3 months then discontinue.    • Pure hypercholesterolemia    • Stricture and stenosis of esophagus 7/3/03; 4/24/06   • Unspecified constipation 10/14/10    IBS    • Unspecified essential hypertension        Past Surgical History:   Procedure Laterality Date   • Colonoscopy diagnostic  03/2003    Dr Nick Dillon   • Colonoscopy diagnostic  06/28/2017    Dr Melchor recall 5 years   • Colonoscopy remove lesions by snare  10/14/10 recall due 10/2015    Dr. Melchor   • Esophagogastroduodenoscopy transoral flex diag  07/03/2003    EGD w/o Bx (Dr. Melchor) stricture   • Esophagogastroduodenoscopy transoral flex w/bx single or mult  06/28/2017    Dr Melchor   • Esophagogastroduodenoscopy transoral flex w/bx single or mult  07/05/2022   • Esophagogastroduodenoscopy transoral flex w/dil gastric duod stricture  04/24/2006    Dr. Melchor   • Extracapsular cataract removal w insert io lens prosth wo ecp Left 01/25/2017    Cataract Removal Lens Implant   • Extracapsular cataract removal w insert io lens prosth wo ecp Right 04/05/2017    Cataract Removal Lens Implant   • Eye surgery     • Flexible sigmoidoscopy diagnostic include specimens  07/1997    normal proctoscopy (to 60 cm) with internal hemorrhoids   • Hernia mesh implant vent/incis for open  09/14/2006    Dr. Rupesh Vogel   • Hernia repair     • Laminectomy,cervical  1986    Laminectomy   • Laparoscopy sling operation  1976 1985    bladder suspension   • Laparoscopy,rp ini ing hernia  09/14/2006    Dr. Rupesh Vogel   • Left heart cath,percutaneous  03/01/2009    [facility] - 50% occlusion   • Repair ing hernia,5+y/o,reducibl  05/1998    right hernia, inguinal  & umbilical hernia repair   • Vaginal hysterectomy      TVH ovaries intact       Family History   Problem Relation Age of Onset   • Cancer Mother         leukemia   • Cancer Maternal Uncle         lung CA   • Cancer Maternal Uncle          colon CA  60's   • Cancer Maternal Aunt         throat CA   • Stroke Brother    • Heart disease Brother    • Cancer, Kidney Brother        SOCIAL HISTORY:  Social History     Tobacco Use   • Smoking status: Former Smoker     Packs/day: 0.25     Years: 40.00     Pack years: 10.00     Types: Cigarettes     Start date: 1962     Quit date: 2017     Years since quittin.6   • Smokeless tobacco: Never Used   • Tobacco comment: has smoked on and off since age 19   Substance Use Topics   • Alcohol use: No       ALLERGIES:   Allergen Reactions   • Ace Inhibitors      cough   • Clindamycin/Lincomycin      nausea   • Hydralazine Other (See Comments)     Aggitation, headache, jittery, sleepy   • Metronidazole      nausea, elevated BP   • Opioid Analgesics      nausea   • Oxycodone      vomiting and itching   • Penicillins RASH and PRURITUS     Tolerated ceftriaxone in 2020.       Current Outpatient Medications   Medication Sig Dispense Refill   • pantoprazole (PROTONIX) 40 MG tablet Take 40 mg by mouth nightly.     • Acetaminophen Extra Strength 500 MG tablet GIVE 2 TABLETS (=1000 MG) BY MOUTH THREE TIMES DAILY 180 tablet 0   • amLODIPine (NORVASC) 5 MG tablet Take 1 tablet by mouth at bedtime. 28 tablet 0   • citalopram (CeleXA) 10 MG tablet Take 1 tablet by mouth daily. 30 tablet 0   • Acetaminophen 500 MG Cap Take 1,000 mg by mouth 3 times daily. 90 capsule 0   • neomycin-bacitracin-polymyxin-pramoxine (Triple Antibiotic Plus) 1 % ointment Apply to affected area topically as needed for minor skin abrasions up to 3 times a day as needed. 30 g 0   • magnesium hydroxide (MILK OF MAGNESIA) 400 MG/5ML suspension 30ml once daily as needed for constipation (only use if no BM X3 days) 1 Bottle 4     No current facility-administered medications for this visit.       REVIEW OF SYSTEMS:  Patient has significant dementia therefore the accuracy of this is uncertain.  No trouble chewing or swallowing, no  cough or  shortness of breath, no chest pain, or peripheral edema.     PHYSICAL EXAM:    VITAL SIGNS:    Visit Vitals  BP 96/66 (BP Location: LUE - Left upper extremity, Patient Position: Sitting, Cuff Size: Regular)   Pulse (!) 39   Ht 5' 3.5\" (1.613 m)   Wt 48.1 kg (106 lb 1.6 oz)   SpO2 (!) 70%   BMI 18.50 kg/m²     GENERAL APPEARANCE:  Healthy, in no distress, cooperative, frequently falling asleep during the visit and then waking and quietly talking or asking questions.  SKIN:  Skin color, texture, turgor are normal. There are no bruises, rashes or lesions.  HEAD:  normocephalic. No masses, lesions, tenderness or abnormalities  EARS:  External ears normal. Canals clear. Tympanic membranes are clear with all landmarks noted.  EYES:  PERRL, EOMI, sclera white and nonicteric, no conjunctival erythema, exudate or swelling; normal lids  NOSE:  normal  MOUTH:  mucosa pink and moist.  THROAT:  Lips, mucosa, and tongue normal. Teeth and gums normal. Oropharynx clear  NECK:  symmetric, supple, without adenopathy, thyroid normal size, non-tender,  without nodularity, carotids normal bilateral and there is some firm areas on the left side of her neck at the level of the thyroid up to the thyroid cartilage.  It is difficult to tell if it is just age-related changes.  The overlying skin is normal.  I do not note any adenopathy but the patient is a smoker with an abnormal exam.  CHEST:  symmetrical, no chest deformities noted and no chest wall tenderness  BACK:  straight, symmetric and nontender to palpation  LUNGS:  clear to auscultation  BREASTS:  deferred   HEART:  regular rate and rhythm and no murmurs, clicks, or gallops  ABDOMEN:  Soft, non-tender, bowel sounds normal, no masses, hepatomegaly or splenomegaly noted  RECTAL:  deferred asymptomatic   PELVIC EXAM:  deferred asymptomatic and pap not due   EXTREMITIES:  no clubbing, cyanosis, edema or deformity noted  NEUROLOGIC:  Speech clear, confused, sometimes words are  unintelligible, able to stand up without difficulty or assistance, walking with a walker,    Recent PHQ 2/9 Score    PHQ 2:  Date Able to Complete Unable to Complete Reason Adult PHQ 2 Score Adult PHQ 2 Interpretation   8/17/2022 - - 6 Further screening needed       PHQ 9:  Date Able to Complete Unable to Complete Reason Adult PHQ 9 Score Adult PHQ 9 Interpretation   8/17/2022 - - 15 Moderately Severe Depression       ASSESSMENT/PLAN:  1. Neck mass  Need to assess due to risk of malignancy secondary to patient's previous smoking history  - US SOFT TISSUE NECK OR HEAD; Future    2. Encounter for subsequent annual wellness visit (AWV) in Medicare patient  Completed today follow up 1 yr     3. Annual physical exam  Completed today follow up 1 yr     4. Caregiver with fatigue  Daughter Sara is in need of additional help.  Letter will was provided to her recommending an additional 40 hours per week so that she is relieved of the responsibilities.  Previously they were clearly pain for more care than they are now and a caregiver fatigue is significant and she is currently in need of respite care.  I recommended she call the state to see if there is anything available separate from what her mother's insurance is actually offering her    5. Late onset Alzheimer's dementia without behavioral disturbance (CMS/HCC)  Dee is doing very well.  She is her usual self, calm, quiet, cooperative.  She is in a safe situation but needs more care than her daughter can provide.    6. Essential hypertension  well controlled - continue current meds and follow up in 6 months     Yashira Terrazas MD  [place] [id number] [place] [id number]  [phone]      (0) indicator not present

## 2022-08-25 ENCOUNTER — RX RENEWAL (OUTPATIENT)
Age: 47
End: 2022-08-25

## 2022-08-31 ENCOUNTER — APPOINTMENT (OUTPATIENT)
Dept: PULMONOLOGY | Facility: CLINIC | Age: 47
End: 2022-08-31

## 2022-09-08 ENCOUNTER — RX RENEWAL (OUTPATIENT)
Age: 47
End: 2022-09-08

## 2022-09-20 ENCOUNTER — APPOINTMENT (OUTPATIENT)
Dept: RHEUMATOLOGY | Facility: CLINIC | Age: 47
End: 2022-09-20

## 2022-09-20 VITALS
HEIGHT: 59 IN | OXYGEN SATURATION: 98 % | BODY MASS INDEX: 23.59 KG/M2 | SYSTOLIC BLOOD PRESSURE: 125 MMHG | WEIGHT: 117 LBS | DIASTOLIC BLOOD PRESSURE: 86 MMHG | TEMPERATURE: 98 F | HEART RATE: 79 BPM

## 2022-09-20 PROCEDURE — 99214 OFFICE O/P EST MOD 30 MIN: CPT

## 2022-09-23 LAB
ALBUMIN SERPL ELPH-MCNC: 4.7 G/DL
ALP BLD-CCNC: 105 U/L
ALT SERPL-CCNC: 23 U/L
ANION GAP SERPL CALC-SCNC: 12 MMOL/L
AST SERPL-CCNC: 24 U/L
BILIRUB SERPL-MCNC: 0.4 MG/DL
BUN SERPL-MCNC: 11 MG/DL
CALCIUM SERPL-MCNC: 9.9 MG/DL
CHLORIDE SERPL-SCNC: 104 MMOL/L
CK SERPL-CCNC: 83 U/L
CO2 SERPL-SCNC: 27 MMOL/L
CREAT SERPL-MCNC: 0.5 MG/DL
CRP SERPL-MCNC: <3 MG/L
EGFR: 118 ML/MIN/1.73M2
ERYTHROCYTE [SEDIMENTATION RATE] IN BLOOD BY WESTERGREN METHOD: 7 MM/HR
GLUCOSE SERPL-MCNC: 74 MG/DL
LDH SERPL-CCNC: 169 U/L
POTASSIUM SERPL-SCNC: 4.5 MMOL/L
PROT SERPL-MCNC: 7.1 G/DL
SODIUM SERPL-SCNC: 143 MMOL/L

## 2022-11-07 ENCOUNTER — RX RENEWAL (OUTPATIENT)
Age: 47
End: 2022-11-07

## 2022-12-11 ENCOUNTER — NON-APPOINTMENT (OUTPATIENT)
Age: 47
End: 2022-12-11

## 2022-12-27 NOTE — ED PROVIDER NOTE - CROS ED HEME ALL NEG
Heart is slightly enlarged but no signs of pneumonia rest drink plenty of fluids and let us know how she is doing toward the end of the week. negative...

## 2023-01-09 ENCOUNTER — RX RENEWAL (OUTPATIENT)
Age: 48
End: 2023-01-09

## 2023-01-24 NOTE — PHYSICAL EXAM
SS received call from [employer] SW Elizabeth Garcia who states court date is tomorrow, and  has requested a copy of meconium drug screen results. Results were positive for Cannabinoids and Buprenorphine. SS faxed results to [employer] SW.    [No Acute Distress] : no acute distress [No Respiratory Distress] : no respiratory distress  [Clear to Auscultation] : lungs were clear to auscultation bilaterally [Normal Rate] : normal rate  [Regular Rhythm] : with a regular rhythm [Normal S1, S2] : normal S1 and S2

## 2023-01-25 ENCOUNTER — APPOINTMENT (OUTPATIENT)
Dept: RHEUMATOLOGY | Facility: CLINIC | Age: 48
End: 2023-01-25
Payer: MEDICAID

## 2023-01-25 VITALS
OXYGEN SATURATION: 98 % | SYSTOLIC BLOOD PRESSURE: 125 MMHG | HEIGHT: 60 IN | HEART RATE: 92 BPM | DIASTOLIC BLOOD PRESSURE: 83 MMHG | RESPIRATION RATE: 15 BRPM | BODY MASS INDEX: 23.95 KG/M2 | WEIGHT: 122 LBS

## 2023-01-25 PROCEDURE — 99214 OFFICE O/P EST MOD 30 MIN: CPT

## 2023-01-26 NOTE — PHYSICAL EXAM
[General Appearance - Alert] : alert [General Appearance - In No Acute Distress] : in no acute distress [Nasal Cavity] : the nasal mucosa and septum were normal [Respiration, Rhythm And Depth] : normal respiratory rhythm and effort [Edema] : there was no peripheral edema [Bowel Sounds] : normal bowel sounds [Abdomen Soft] : soft [Abdomen Tenderness] : non-tender [] : no rash [FreeTextEntry1] : +  telangiectasia; sclerodactyly with skin thickening of forearm and upper arms [No Focal Deficits] : no focal deficits [Oriented To Time, Place, And Person] : oriented to person, place, and time [Impaired Insight] : insight and judgment were intact

## 2023-01-26 NOTE — ASSESSMENT
[FreeTextEntry1] : \par Diffuse Systemic Scleroderma with positive TIMO and Raynaud's\par Overlap myositis, now with right winging scapula and restriction of right shoulder abduction - no improvement \par ILD, s/p Cytoxan, on Cellcept  , now on Cellcept 1.5 g a day\par now with left LQ pain- will need GYN evaluation\par history of nephrolithiasis - had obstructive renal stone \par right side neck pain, but on exam small palpable ln, movable - will observe - if persists/ increased in size- may need US neck\par Positive Quantiferon , was tx for latent TB in [id number]\par \par \par = labs \par = continue CellCept to 1.5 g a day\par = continue HCQ \par = repeat PFT and may need repeat CT chest \par = PCP / GYN evaluation for LLQ pain - if worse may need to go to ER\par = if neck pain persists/worsens-may need US neck\par \par \par RTO 2 months or earlier if needed\par

## 2023-01-26 NOTE — HISTORY OF PRESENT ILLNESS
[___ Month(s) Ago] : [unfilled] month(s) ago [de-identified] : Last was seen in May , 2022 [FreeTextEntry1] : Interval HIstory :\par --------------------\par no SOB , no chest pain. \par R shoulder pain/weakness - improved\par + weakness of left shoulder. \par No rash, no oral ulcers \par also feels a swollen nodule on right side of the neck\par on Hydroxychloroquine 200 mg a day and Cellcept 500 mg TID\par on alendronate for osteoporosis

## 2023-01-30 LAB
ALBUMIN SERPL ELPH-MCNC: 4.5 G/DL
ALP BLD-CCNC: 89 U/L
ALT SERPL-CCNC: 18 U/L
ANION GAP SERPL CALC-SCNC: 13 MMOL/L
APPEARANCE: CLEAR
AST SERPL-CCNC: 21 U/L
BACTERIA: NEGATIVE
BASOPHILS # BLD AUTO: 0.05 K/UL
BASOPHILS NFR BLD AUTO: 0.5 %
BILIRUB SERPL-MCNC: 0.3 MG/DL
BILIRUBIN URINE: NEGATIVE
BLOOD URINE: NEGATIVE
BUN SERPL-MCNC: 13 MG/DL
CALCIUM SERPL-MCNC: 9.8 MG/DL
CHLORIDE SERPL-SCNC: 103 MMOL/L
CO2 SERPL-SCNC: 25 MMOL/L
COLOR: NORMAL
CREAT SERPL-MCNC: 0.5 MG/DL
CREAT SPEC-SCNC: 39 MG/DL
CREAT/PROT UR: 0.1 RATIO
CRP SERPL-MCNC: <3 MG/L
EGFR: 117 ML/MIN/1.73M2
EOSINOPHIL # BLD AUTO: 0.13 K/UL
EOSINOPHIL NFR BLD AUTO: 1.4 %
ERYTHROCYTE [SEDIMENTATION RATE] IN BLOOD BY WESTERGREN METHOD: 11 MM/HR
GLUCOSE QUALITATIVE U: NEGATIVE
GLUCOSE SERPL-MCNC: 84 MG/DL
HCT VFR BLD CALC: 41.7 %
HGB BLD-MCNC: 13.2 G/DL
HYALINE CASTS: 1 /LPF
IMM GRANULOCYTES NFR BLD AUTO: 0.2 %
KETONES URINE: NEGATIVE
LEUKOCYTE ESTERASE URINE: NEGATIVE
LYMPHOCYTES # BLD AUTO: 2.93 K/UL
LYMPHOCYTES NFR BLD AUTO: 32 %
MAN DIFF?: NORMAL
MCHC RBC-ENTMCNC: 25.9 PG
MCHC RBC-ENTMCNC: 31.7 GM/DL
MCV RBC AUTO: 81.8 FL
MICROSCOPIC-UA: NORMAL
MONOCYTES # BLD AUTO: 0.93 K/UL
MONOCYTES NFR BLD AUTO: 10.2 %
NEUTROPHILS # BLD AUTO: 5.1 K/UL
NEUTROPHILS NFR BLD AUTO: 55.7 %
NITRITE URINE: NEGATIVE
PH URINE: 7.5
PLATELET # BLD AUTO: 192 K/UL
POTASSIUM SERPL-SCNC: 4.6 MMOL/L
PROT SERPL-MCNC: 7 G/DL
PROT UR-MCNC: 6 MG/DL
PROTEIN URINE: NEGATIVE
RBC # BLD: 5.1 M/UL
RBC # FLD: 14.5 %
RED BLOOD CELLS URINE: 1 /HPF
SODIUM SERPL-SCNC: 141 MMOL/L
SPECIFIC GRAVITY URINE: 1.02
SQUAMOUS EPITHELIAL CELLS: 2 /HPF
UROBILINOGEN URINE: NORMAL
WBC # FLD AUTO: 9.16 K/UL
WHITE BLOOD CELLS URINE: 1 /HPF

## 2023-02-01 ENCOUNTER — APPOINTMENT (OUTPATIENT)
Dept: INTERNAL MEDICINE | Facility: CLINIC | Age: 48
End: 2023-02-01
Payer: MEDICAID

## 2023-02-01 ENCOUNTER — OUTPATIENT (OUTPATIENT)
Dept: OUTPATIENT SERVICES | Facility: HOSPITAL | Age: 48
LOS: 1 days | End: 2023-02-01

## 2023-02-01 VITALS
DIASTOLIC BLOOD PRESSURE: 80 MMHG | HEART RATE: 88 BPM | OXYGEN SATURATION: 97 % | BODY MASS INDEX: 21.79 KG/M2 | WEIGHT: 111 LBS | HEIGHT: 60 IN | SYSTOLIC BLOOD PRESSURE: 110 MMHG

## 2023-02-01 DIAGNOSIS — Z98.890 OTHER SPECIFIED POSTPROCEDURAL STATES: Chronic | ICD-10-CM

## 2023-02-01 DIAGNOSIS — R10.31 RIGHT LOWER QUADRANT PAIN: ICD-10-CM

## 2023-02-01 DIAGNOSIS — Z12.11 ENCOUNTER FOR SCREENING FOR MALIGNANT NEOPLASM OF COLON: ICD-10-CM

## 2023-02-01 DIAGNOSIS — Z87.448 PERSONAL HISTORY OF OTHER DISEASES OF URINARY SYSTEM: ICD-10-CM

## 2023-02-01 DIAGNOSIS — G56.80 OTHER SPECIFIED MONONEUROPATHIES OF UNSPECIFIED UPPER LIMB: ICD-10-CM

## 2023-02-01 DIAGNOSIS — R63.4 ABNORMAL WEIGHT LOSS: ICD-10-CM

## 2023-02-01 DIAGNOSIS — R10.11 RIGHT UPPER QUADRANT PAIN: ICD-10-CM

## 2023-02-01 DIAGNOSIS — M75.00 ADHESIVE CAPSULITIS OF UNSPECIFIED SHOULDER: ICD-10-CM

## 2023-02-01 DIAGNOSIS — R92.2 INCONCLUSIVE MAMMOGRAM: ICD-10-CM

## 2023-02-01 DIAGNOSIS — R53.81 OTHER MALAISE: ICD-10-CM

## 2023-02-01 DIAGNOSIS — Z11.59 ENCOUNTER FOR SCREENING FOR OTHER VIRAL DISEASES: ICD-10-CM

## 2023-02-01 DIAGNOSIS — R53.83 OTHER MALAISE: ICD-10-CM

## 2023-02-01 DIAGNOSIS — L98.9 DISORDER OF THE SKIN AND SUBCUTANEOUS TISSUE, UNSPECIFIED: ICD-10-CM

## 2023-02-01 DIAGNOSIS — Z01.419 ENCOUNTER FOR GYNECOLOGICAL EXAMINATION (GENERAL) (ROUTINE) W/OUT ABNORMAL FINDINGS: ICD-10-CM

## 2023-02-01 DIAGNOSIS — Z98.89 OTHER SPECIFIED POSTPROCEDURAL STATES: Chronic | ICD-10-CM

## 2023-02-01 DIAGNOSIS — Z00.00 ENCOUNTER FOR GENERAL ADULT MEDICAL EXAMINATION W/OUT ABNORMAL FINDINGS: ICD-10-CM

## 2023-02-01 PROCEDURE — 99396 PREV VISIT EST AGE 40-64: CPT

## 2023-02-06 DIAGNOSIS — Z00.00 ENCOUNTER FOR GENERAL ADULT MEDICAL EXAMINATION WITHOUT ABNORMAL FINDINGS: ICD-10-CM

## 2023-02-06 DIAGNOSIS — R14.0 ABDOMINAL DISTENSION (GASEOUS): ICD-10-CM

## 2023-02-06 DIAGNOSIS — E03.9 HYPOTHYROIDISM, UNSPECIFIED: ICD-10-CM

## 2023-02-06 DIAGNOSIS — M34.9 SYSTEMIC SCLEROSIS, UNSPECIFIED: ICD-10-CM

## 2023-02-06 DIAGNOSIS — M75.00 ADHESIVE CAPSULITIS OF UNSPECIFIED SHOULDER: ICD-10-CM

## 2023-02-06 DIAGNOSIS — J84.9 INTERSTITIAL PULMONARY DISEASE, UNSPECIFIED: ICD-10-CM

## 2023-02-06 DIAGNOSIS — R10.31 RIGHT LOWER QUADRANT PAIN: ICD-10-CM

## 2023-02-06 DIAGNOSIS — M35.1 OTHER OVERLAP SYNDROMES: ICD-10-CM

## 2023-02-06 DIAGNOSIS — R92.2 INCONCLUSIVE MAMMOGRAM: ICD-10-CM

## 2023-02-06 PROBLEM — Z12.11 SCREEN FOR COLON CANCER: Status: RESOLVED | Noted: 2022-01-26 | Resolved: 2023-02-06

## 2023-02-06 PROBLEM — R53.81 MALAISE AND FATIGUE: Status: RESOLVED | Noted: 2020-03-06 | Resolved: 2023-02-06

## 2023-02-06 PROBLEM — Z01.419 WELL WOMAN EXAM WITH ROUTINE GYNECOLOGICAL EXAM: Status: RESOLVED | Noted: 2022-03-08 | Resolved: 2023-02-06

## 2023-02-06 PROBLEM — G56.80 SCAPULOTHORACIC SYNDROME: Status: RESOLVED | Noted: 2017-08-28 | Resolved: 2023-02-06

## 2023-02-06 PROBLEM — R10.11 RIGHT UPPER QUADRANT ABDOMINAL PAIN OF UNKNOWN ETIOLOGY: Status: RESOLVED | Noted: 2020-08-26 | Resolved: 2023-02-06

## 2023-02-06 PROBLEM — Z11.59 SCREENING FOR VIRAL DISEASE: Status: RESOLVED | Noted: 2022-06-13 | Resolved: 2023-02-06

## 2023-02-06 PROBLEM — L98.9 SKIN LESION: Status: RESOLVED | Noted: 2021-02-22 | Resolved: 2023-02-06

## 2023-02-06 PROBLEM — Z87.448 HISTORY OF RENAL COLIC: Status: RESOLVED | Noted: 2019-10-16 | Resolved: 2023-02-06

## 2023-02-06 NOTE — HEALTH RISK ASSESSMENT
[None] : None [With Family] : lives with family [] :  [Reports changes in hearing] : Reports no changes in hearing [Reports changes in vision] : Reports no changes in vision [Reports changes in dental health] : Reports no changes in dental health

## 2023-02-06 NOTE — ASSESSMENT
[FreeTextEntry1] : Assessment as indicated above in impressions with plans through orders below. \par \par Patient asked to have Rheum obtain TSH during their labs for annual check in.

## 2023-02-06 NOTE — HISTORY OF PRESENT ILLNESS
[FreeTextEntry1] : annual [de-identified] : Patient presents for her annual exam.  She continues to have problems with her shoulders, being limited in raising them as they are stiff.  Has done physical therapy with minimal relief.  Also needs an Ophthalmologist.\par \par She is concerned about right lower quadrant pain 4/10, mild, annoying pain for the 10 days, today not really there.  pain radiates to middle and lower back 6/10 sometimes.  Worried that it may be the kidney stone returned. \par \par

## 2023-02-06 NOTE — PHYSICAL EXAM
[No Acute Distress] : no acute distress [Well-Appearing] : well-appearing [Normal Voice/Communication] : normal voice/communication [Normal Sclera/Conjunctiva] : normal sclera/conjunctiva [No Lymphadenopathy] : no lymphadenopathy [No Edema] : there was no peripheral edema [Normal] : no posterior cervical lymphadenopathy and no anterior cervical lymphadenopathy [No CVA Tenderness] : no CVA  tenderness [Deep Tendon Reflexes (DTR)] : deep tendon reflexes were 2+ and symmetric [Speech Grossly Normal] : speech grossly normal [Normal Mood] : the mood was normal [de-identified] : limited range of motion of both shoulders, right worse than left

## 2023-02-06 NOTE — REVIEW OF SYSTEMS
[Abdominal Pain] : abdominal pain [Constipation] : no constipation [Diarrhea] : diarrhea [Joint Stiffness] : joint stiffness [Negative] : Heme/Lymph [FreeTextEntry7] : see HPI [FreeTextEntry9] : see HPI

## 2023-02-10 ENCOUNTER — APPOINTMENT (OUTPATIENT)
Dept: ULTRASOUND IMAGING | Facility: CLINIC | Age: 48
End: 2023-02-10
Payer: MEDICAID

## 2023-02-10 ENCOUNTER — OUTPATIENT (OUTPATIENT)
Dept: OUTPATIENT SERVICES | Facility: HOSPITAL | Age: 48
LOS: 1 days | End: 2023-02-10
Payer: MEDICAID

## 2023-02-10 DIAGNOSIS — Z98.890 OTHER SPECIFIED POSTPROCEDURAL STATES: Chronic | ICD-10-CM

## 2023-02-10 DIAGNOSIS — R10.31 RIGHT LOWER QUADRANT PAIN: ICD-10-CM

## 2023-02-10 DIAGNOSIS — Z98.89 OTHER SPECIFIED POSTPROCEDURAL STATES: Chronic | ICD-10-CM

## 2023-02-10 PROCEDURE — 76705 ECHO EXAM OF ABDOMEN: CPT | Mod: 26

## 2023-02-10 PROCEDURE — 76705 ECHO EXAM OF ABDOMEN: CPT

## 2023-02-10 PROCEDURE — 76856 US EXAM PELVIC COMPLETE: CPT

## 2023-02-10 PROCEDURE — 76856 US EXAM PELVIC COMPLETE: CPT | Mod: 26

## 2023-02-15 ENCOUNTER — APPOINTMENT (OUTPATIENT)
Dept: OBGYN | Facility: CLINIC | Age: 48
End: 2023-02-15
Payer: MEDICAID

## 2023-02-15 VITALS
BODY MASS INDEX: 24.15 KG/M2 | DIASTOLIC BLOOD PRESSURE: 82 MMHG | WEIGHT: 123 LBS | SYSTOLIC BLOOD PRESSURE: 139 MMHG | HEIGHT: 60 IN | HEART RATE: 76 BPM

## 2023-02-15 PROCEDURE — 99213 OFFICE O/P EST LOW 20 MIN: CPT

## 2023-02-15 NOTE — HISTORY OF PRESENT ILLNESS
[FreeTextEntry1] : Patient has not had menses in over a year. Has RLQ pain for approx 1 month. It is constant. Denies dysuria and has had some diarrhea. Had colonoscopy in 2021. Took tylenol for pain with some relief.\par Patient had sonogram on 2/10 that was wnl. Patient seemed to have forgotten that she had this done.\par Patient is sexually active and denies any discharge.

## 2023-03-01 ENCOUNTER — APPOINTMENT (OUTPATIENT)
Dept: INTERNAL MEDICINE | Facility: CLINIC | Age: 48
End: 2023-03-01
Payer: MEDICAID

## 2023-03-01 ENCOUNTER — OUTPATIENT (OUTPATIENT)
Dept: OUTPATIENT SERVICES | Facility: HOSPITAL | Age: 48
LOS: 1 days | End: 2023-03-01

## 2023-03-01 ENCOUNTER — APPOINTMENT (OUTPATIENT)
Dept: OPHTHALMOLOGY | Facility: CLINIC | Age: 48
End: 2023-03-01

## 2023-03-01 VITALS
WEIGHT: 123 LBS | HEART RATE: 78 BPM | OXYGEN SATURATION: 98 % | SYSTOLIC BLOOD PRESSURE: 110 MMHG | BODY MASS INDEX: 24.15 KG/M2 | HEIGHT: 60 IN | DIASTOLIC BLOOD PRESSURE: 80 MMHG

## 2023-03-01 DIAGNOSIS — R68.81 EARLY SATIETY: ICD-10-CM

## 2023-03-01 DIAGNOSIS — Z98.890 OTHER SPECIFIED POSTPROCEDURAL STATES: Chronic | ICD-10-CM

## 2023-03-01 DIAGNOSIS — Z98.89 OTHER SPECIFIED POSTPROCEDURAL STATES: Chronic | ICD-10-CM

## 2023-03-01 DIAGNOSIS — Z87.442 PERSONAL HISTORY OF URINARY CALCULI: ICD-10-CM

## 2023-03-01 PROCEDURE — 99214 OFFICE O/P EST MOD 30 MIN: CPT

## 2023-03-01 RX ORDER — SIMETHICONE 80 MG/1
80 TABLET, CHEWABLE ORAL
Qty: 360 | Refills: 2 | Status: DISCONTINUED | COMMUNITY
Start: 2022-03-01 | End: 2023-03-01

## 2023-03-01 RX ORDER — SIMETHICONE 125 MG/1
125 TABLET, CHEWABLE ORAL
Qty: 360 | Refills: 3 | Status: COMPLETED | COMMUNITY
Start: 2023-03-01 | End: 2024-02-24

## 2023-03-13 DIAGNOSIS — R68.81 EARLY SATIETY: ICD-10-CM

## 2023-03-13 DIAGNOSIS — R19.7 DIARRHEA, UNSPECIFIED: ICD-10-CM

## 2023-03-13 DIAGNOSIS — R10.31 RIGHT LOWER QUADRANT PAIN: ICD-10-CM

## 2023-03-13 DIAGNOSIS — Z87.442 PERSONAL HISTORY OF URINARY CALCULI: ICD-10-CM

## 2023-03-13 DIAGNOSIS — R14.0 ABDOMINAL DISTENSION (GASEOUS): ICD-10-CM

## 2023-03-13 NOTE — HISTORY OF PRESENT ILLNESS
[FreeTextEntry1] : abdominal pain folllow up [de-identified] : Patient continues to have pain in her abdomen, reports it is about a 7/10 all day, right lower quadrant, more days than not, weekly, no vomiting, positive vomiting.\par \par Loose stools at first, then constipated on last day. \par \par Heartburn occurring as well. Always hungry, more than usual hungry.

## 2023-03-13 NOTE — REVIEW OF SYSTEMS
[Recent Change In Weight] : ~T no recent weight change [Joint Pain] : joint pain [Joint Stiffness] : joint stiffness [FreeTextEntry2] : see also hPI [FreeTextEntry7] : see HPI

## 2023-03-13 NOTE — PHYSICAL EXAM
[No Acute Distress] : no acute distress [Normal Voice/Communication] : normal voice/communication [Soft] : abdomen soft [Non-distended] : non-distended [No HSM] : no HSM

## 2023-03-15 ENCOUNTER — APPOINTMENT (OUTPATIENT)
Dept: OPHTHALMOLOGY | Facility: CLINIC | Age: 48
End: 2023-03-15
Payer: MEDICAID

## 2023-03-15 ENCOUNTER — NON-APPOINTMENT (OUTPATIENT)
Age: 48
End: 2023-03-15

## 2023-03-15 PROCEDURE — 92134 CPTRZ OPH DX IMG PST SGM RTA: CPT

## 2023-03-15 PROCEDURE — 92083 EXTENDED VISUAL FIELD XM: CPT

## 2023-03-15 PROCEDURE — 92283 EXTND COLOR VISION XM: CPT

## 2023-03-15 PROCEDURE — 92004 COMPRE OPH EXAM NEW PT 1/>: CPT

## 2023-03-23 ENCOUNTER — RX CHANGE (OUTPATIENT)
Age: 48
End: 2023-03-23

## 2023-03-23 RX ORDER — OMEPRAZOLE 20 MG/1
20 CAPSULE, DELAYED RELEASE ORAL DAILY
Qty: 30 | Refills: 3 | Status: DISCONTINUED | COMMUNITY
Start: 2022-04-28 | End: 2023-03-23

## 2023-04-05 ENCOUNTER — APPOINTMENT (OUTPATIENT)
Dept: UROLOGY | Facility: CLINIC | Age: 48
End: 2023-04-05

## 2023-04-28 ENCOUNTER — RX RENEWAL (OUTPATIENT)
Age: 48
End: 2023-04-28

## 2023-05-13 ENCOUNTER — APPOINTMENT (OUTPATIENT)
Dept: ULTRASOUND IMAGING | Facility: CLINIC | Age: 48
End: 2023-05-13

## 2023-05-13 ENCOUNTER — APPOINTMENT (OUTPATIENT)
Dept: MAMMOGRAPHY | Facility: CLINIC | Age: 48
End: 2023-05-13

## 2023-05-26 ENCOUNTER — RX RENEWAL (OUTPATIENT)
Age: 48
End: 2023-05-26

## 2023-06-01 ENCOUNTER — OUTPATIENT (OUTPATIENT)
Dept: OUTPATIENT SERVICES | Facility: HOSPITAL | Age: 48
LOS: 1 days | End: 2023-06-01

## 2023-06-01 ENCOUNTER — APPOINTMENT (OUTPATIENT)
Dept: GASTROENTEROLOGY | Facility: CLINIC | Age: 48
End: 2023-06-01
Payer: MEDICAID

## 2023-06-01 VITALS
WEIGHT: 123 LBS | SYSTOLIC BLOOD PRESSURE: 112 MMHG | HEIGHT: 60 IN | BODY MASS INDEX: 24.15 KG/M2 | HEART RATE: 93 BPM | RESPIRATION RATE: 15 BRPM | DIASTOLIC BLOOD PRESSURE: 86 MMHG | OXYGEN SATURATION: 98 %

## 2023-06-01 DIAGNOSIS — Z98.89 OTHER SPECIFIED POSTPROCEDURAL STATES: Chronic | ICD-10-CM

## 2023-06-01 DIAGNOSIS — Z98.890 OTHER SPECIFIED POSTPROCEDURAL STATES: Chronic | ICD-10-CM

## 2023-06-01 DIAGNOSIS — R14.0 ABDOMINAL DISTENSION (GASEOUS): ICD-10-CM

## 2023-06-01 PROCEDURE — 99213 OFFICE O/P EST LOW 20 MIN: CPT | Mod: GC

## 2023-06-02 ENCOUNTER — RX RENEWAL (OUTPATIENT)
Age: 48
End: 2023-06-02

## 2023-06-02 NOTE — ASSESSMENT
[FreeTextEntry1] : 46 yo F with scleroderma and ILD on cellcept/PLQ c/b chronic diarrhea (~3-4 loose/soft BMs per day, brown) wit negative celiac studies, hypothyroidism, interna hemorrhoids, diverticulosis, gastritis/reflux esophagitis (EGD 11/2020, negative HP) on famotidine 20 qhs who presents to clinic today to follow up GERD/bloating.\par \par # GERD, resolved with famotidine 20qhs and dietary modification\par # Chronic diarrhea likely 2/2 scleroderma c/b bloating (bloating resolved with dietary modification) negative celiac studies - will need to r/o sibo considering her sx and h/o scleroderma and its potential affect on gut motility\par # Colonoscopy - 11/2020 with diverticulosis and internal hemorrhoids, repeat 2030\par \par Plan\par - SIBO testing, although bloating essentially resolved aside when strays from her diet, reasonable to test\par - Continue famotidine 20 qhs and dietary modifications\par - Colonoscopy in 2030\par \par RTC pending SIBO testing\par \par Preliminary note until signed by Attending.\par \par Ruth Ross MD\par GI/Hepatology Fellow, PGY6\par

## 2023-06-02 NOTE — PHYSICAL EXAM
[Alert] : alert [Normal Voice/Communication] : normal voice/communication [Sclera] : the sclera and conjunctiva were normal [Hearing Threshold Finger Rub Not [place]] : hearing was normal [Normal Appearance] : the appearance of the neck was normal [No Respiratory Distress] : no respiratory distress [Abdomen Tenderness] : non-tender [No Masses] : no abdominal mass palpated [Abdomen Soft] : soft [] : no hepatosplenomegaly [Normal Color / Pigmentation] : normal skin color and pigmentation [Oriented To Time, Place, And Person] : oriented to person, place, and time [Normal Affect] : the affect was normal [de-identified] : Regular rate

## 2023-06-02 NOTE — HISTORY OF PRESENT ILLNESS
[FreeTextEntry1] : 46 yo F with scleroderma and ILD on cellcept/PLQ c/b chronic diarrhea (~3-4 loose/soft BMs per day, brown) wit negative celiac studies, hypothyroidism, interna hemorrhoids, diverticulosis, gastritis/reflux esophagitis (EGD 11/2020, negative HP) on famotidine 20 qhs who presents to clinic today to follow up GERD/bloating.\par \par She was last seen in clinic 4/2022 with complaints of worsening bloating and GERD and plans to start ompreazole and test for SIBO given h/o scleroderma. \par \par Patient says did not get SIBO testing, but says she is now feeling a lot better and only has infrequent episodes of GERD and bloating depending on what she eats. So she avoids food triggers and is currently taking famotidine 20 qhs and happy. Denies n/v/abdominal pain. Stools are at baseline of ~3 loose/soft and brown per day; no bloody red/black stools. No painful or difficulty swallowing.

## 2023-06-05 DIAGNOSIS — R12 HEARTBURN: ICD-10-CM

## 2023-06-05 DIAGNOSIS — R14.0 ABDOMINAL DISTENSION (GASEOUS): ICD-10-CM

## 2023-06-07 ENCOUNTER — APPOINTMENT (OUTPATIENT)
Dept: INTERNAL MEDICINE | Facility: CLINIC | Age: 48
End: 2023-06-07
Payer: MEDICAID

## 2023-06-07 ENCOUNTER — OUTPATIENT (OUTPATIENT)
Dept: OUTPATIENT SERVICES | Facility: HOSPITAL | Age: 48
LOS: 1 days | End: 2023-06-07

## 2023-06-07 VITALS — HEIGHT: 60 IN | WEIGHT: 123 LBS | BODY MASS INDEX: 24.15 KG/M2

## 2023-06-07 DIAGNOSIS — Z98.89 OTHER SPECIFIED POSTPROCEDURAL STATES: Chronic | ICD-10-CM

## 2023-06-07 DIAGNOSIS — M62.81 MUSCLE WEAKNESS (GENERALIZED): ICD-10-CM

## 2023-06-07 DIAGNOSIS — Z98.890 OTHER SPECIFIED POSTPROCEDURAL STATES: Chronic | ICD-10-CM

## 2023-06-07 PROCEDURE — 99213 OFFICE O/P EST LOW 20 MIN: CPT | Mod: 95

## 2023-06-07 RX ORDER — DICLOFENAC SODIUM 1% 10 MG/G
1 GEL TOPICAL
Qty: 1 | Refills: 5 | Status: ACTIVE | COMMUNITY
Start: 2023-06-07 | End: 1900-01-01

## 2023-06-08 PROBLEM — M62.81 MUSCLE WEAKNESS OF UPPER EXTREMITY: Status: ACTIVE | Noted: 2022-09-20

## 2023-06-08 NOTE — PHYSICAL EXAM
[No Acute Distress] : no acute distress [Normal Voice/Communication] : normal voice/communication [No Respiratory Distress] : no respiratory distress  [de-identified] : limited exam conducted over video for telehealth visit today  [de-identified] : limited range of motion of both arms, right worse than left.

## 2023-06-08 NOTE — ASSESSMENT
[FreeTextEntry1] : Assessment as indicated above in impressions with plans through orders below. Lab orders placed, serum being drawn in future

## 2023-06-08 NOTE — HISTORY OF PRESENT ILLNESS
[FreeTextEntry1] : arm pain [de-identified] : left arm pain from elbow worsening, had been present for about 2 months.  Highly concerned as it is the only good arm, since her right shoulder is frozen with limitation to movements.  No pain today, Pain comes on without inciting factors, 7/10 sometimes 3/10  \par \par If does too much chores, pain comes on, \par \par Takes aleve which lasts for a few hours.

## 2023-06-09 DIAGNOSIS — M62.81 MUSCLE WEAKNESS (GENERALIZED): ICD-10-CM

## 2023-06-09 DIAGNOSIS — M35.1 OTHER OVERLAP SYNDROMES: ICD-10-CM

## 2023-06-09 DIAGNOSIS — M60.9 MYOSITIS, UNSPECIFIED: ICD-10-CM

## 2023-06-09 DIAGNOSIS — J84.9 INTERSTITIAL PULMONARY DISEASE, UNSPECIFIED: ICD-10-CM

## 2023-06-09 DIAGNOSIS — M79.602 PAIN IN LEFT ARM: ICD-10-CM

## 2023-06-09 DIAGNOSIS — E03.9 HYPOTHYROIDISM, UNSPECIFIED: ICD-10-CM

## 2023-06-09 DIAGNOSIS — E55.9 VITAMIN D DEFICIENCY, UNSPECIFIED: ICD-10-CM

## 2023-07-12 ENCOUNTER — APPOINTMENT (OUTPATIENT)
Dept: RHEUMATOLOGY | Facility: CLINIC | Age: 48
End: 2023-07-12
Payer: MEDICAID

## 2023-07-12 VITALS
HEART RATE: 98 BPM | DIASTOLIC BLOOD PRESSURE: 89 MMHG | SYSTOLIC BLOOD PRESSURE: 125 MMHG | BODY MASS INDEX: 24.15 KG/M2 | WEIGHT: 123 LBS | OXYGEN SATURATION: 98 % | HEIGHT: 60 IN

## 2023-07-12 DIAGNOSIS — I73.9 PERIPHERAL VASCULAR DISEASE, UNSPECIFIED: ICD-10-CM

## 2023-07-12 DIAGNOSIS — M60.9 MYOSITIS, UNSPECIFIED: ICD-10-CM

## 2023-07-12 DIAGNOSIS — M79.604 PAIN IN RIGHT LEG: ICD-10-CM

## 2023-07-12 DIAGNOSIS — M79.605 PAIN IN RIGHT LEG: ICD-10-CM

## 2023-07-12 DIAGNOSIS — M79.10 MYALGIA, UNSPECIFIED SITE: ICD-10-CM

## 2023-07-12 PROCEDURE — 99214 OFFICE O/P EST MOD 30 MIN: CPT

## 2023-07-12 NOTE — ASSESSMENT
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 claudication\par ILD, s/p Cytoxan, on Cellcept  , now on Cellcept 1.5 g a day\par now with left LQ pain- will need GYN evaluation\par history of nephrolithiasis - had obstructive renal stone \par right side neck pain, but on exam small palpable ln, movable - will observe - if persists/ increased in size- may need US neck\par Positive Quantiferon, was tx for latent TB in 2015 -2016\par \par \par = labs \par = continue CellCept to 1.5 g a day\par = continue HCQ \par = arterial US to evaluate for arterial stenosis as a cause of claudications\par = repeat PFT \par  \par  \par RTO 2 months or earlier if needed\par

## 2023-07-12 NOTE — PHYSICAL EXAM
[General Appearance - Alert] : alert [General Appearance - In No Acute Distress] : in no acute distress [Nasal Cavity] : the nasal mucosa and septum were normal [Respiration, Rhythm And Depth] : normal respiratory rhythm and effort [Edema] : there was no peripheral edema [Bowel Sounds] : normal bowel sounds [Abdomen Soft] : soft [Abdomen Tenderness] : non-tender [] : no rash [No Focal Deficits] : no focal deficits [Oriented To Time, Place, And Person] : oriented to person, place, and time [Impaired Insight] : insight and judgment were intact [FreeTextEntry1] : +  telangiectasia; sclerodactyly with skin thickening of forearm and upper arms

## 2023-07-12 NOTE — HISTORY OF PRESENT ILLNESS
[___ Month(s) Ago] : [unfilled] month(s) ago [de-identified] : Last was seen in January 2023 [FreeTextEntry1] : Interval HIstory :\par --------------------\par no SOB , no chest pain. \par overall was ok, but c/o lower LE pain on walking and standing fro prolonged time\par + weakness of left shoulder as previously\par No rash, no oral ulcers \par on Hydroxychloroquine 200 mg a day and Cellcept 500 mg TID\par on Boniva for osteoporosis

## 2023-07-17 LAB
ALBUMIN SERPL ELPH-MCNC: 4.7 G/DL
ALP BLD-CCNC: 109 U/L
ALT SERPL-CCNC: 17 U/L
ANION GAP SERPL CALC-SCNC: 10 MMOL/L
APPEARANCE: CLEAR
AST SERPL-CCNC: 21 U/L
BILIRUB SERPL-MCNC: 0.5 MG/DL
BILIRUBIN URINE: NEGATIVE
BLOOD URINE: NEGATIVE
BUN SERPL-MCNC: 14 MG/DL
CALCIUM SERPL-MCNC: 9.3 MG/DL
CHLORIDE SERPL-SCNC: 107 MMOL/L
CO2 SERPL-SCNC: 25 MMOL/L
COLOR: YELLOW
CREAT SERPL-MCNC: 0.52 MG/DL
CREAT SPEC-SCNC: 101 MG/DL
CREAT/PROT UR: 0.1 RATIO
CRP SERPL-MCNC: <3 MG/L
EGFR: 116 ML/MIN/1.73M2
ERYTHROCYTE [SEDIMENTATION RATE] IN BLOOD BY WESTERGREN METHOD: 20 MM/HR
GLUCOSE QUALITATIVE U: NEGATIVE MG/DL
GLUCOSE SERPL-MCNC: 85 MG/DL
KETONES URINE: NEGATIVE MG/DL
LEUKOCYTE ESTERASE URINE: NEGATIVE
NITRITE URINE: NEGATIVE
PH URINE: 5.5
POTASSIUM SERPL-SCNC: 4.9 MMOL/L
PROT SERPL-MCNC: 7.3 G/DL
PROT UR-MCNC: 7 MG/DL
PROTEIN URINE: NEGATIVE MG/DL
SODIUM SERPL-SCNC: 143 MMOL/L
SPECIFIC GRAVITY URINE: 1.02
URINE CYTOLOGY: NORMAL
UROBILINOGEN URINE: 0.2 MG/DL

## 2023-07-21 LAB
25(OH)D3 SERPL-MCNC: 16 NG/ML
ALBUMIN SERPL ELPH-MCNC: 4.7 G/DL
ALP BLD-CCNC: 109 U/L
ALT SERPL-CCNC: 18 U/L
ANION GAP SERPL CALC-SCNC: 11 MMOL/L
AST SERPL-CCNC: 21 U/L
BILIRUB SERPL-MCNC: 0.4 MG/DL
BUN SERPL-MCNC: 14 MG/DL
CALCIUM SERPL-MCNC: 9.4 MG/DL
CHLORIDE SERPL-SCNC: 107 MMOL/L
CHOLEST SERPL-MCNC: 192 MG/DL
CK SERPL-CCNC: 158 U/L
CO2 SERPL-SCNC: 25 MMOL/L
CREAT SERPL-MCNC: 0.51 MG/DL
CREAT SPEC-SCNC: 96 MG/DL
CREAT/PROT UR: 0.1 RATIO
CRP SERPL-MCNC: <3 MG/L
EGFR: 117 ML/MIN/1.73M2
ERYTHROCYTE [SEDIMENTATION RATE] IN BLOOD BY WESTERGREN METHOD: 26 MM/HR
ESTIMATED AVERAGE GLUCOSE: 103 MG/DL
FOLATE SERPL-MCNC: 19.5 NG/ML
GLUCOSE SERPL-MCNC: 84 MG/DL
HBA1C MFR BLD HPLC: 5.2 %
HDLC SERPL-MCNC: 45 MG/DL
LDLC SERPL CALC-MCNC: 127 MG/DL
NONHDLC SERPL-MCNC: 147 MG/DL
POTASSIUM SERPL-SCNC: 5 MMOL/L
PROT SERPL-MCNC: 7.3 G/DL
PROT UR-MCNC: 8 MG/DL
SODIUM SERPL-SCNC: 143 MMOL/L
TRIGL SERPL-MCNC: 109 MG/DL
TSH SERPL-ACNC: 0.02 UIU/ML
VIT B12 SERPL-MCNC: >2000 PG/ML

## 2023-07-24 ENCOUNTER — APPOINTMENT (OUTPATIENT)
Dept: ULTRASOUND IMAGING | Facility: CLINIC | Age: 48
End: 2023-07-24

## 2023-08-16 ENCOUNTER — RX RENEWAL (OUTPATIENT)
Age: 48
End: 2023-08-16

## 2023-08-21 ENCOUNTER — RX RENEWAL (OUTPATIENT)
Age: 48
End: 2023-08-21

## 2023-10-29 NOTE — ASSESSMENT
[facility] [FreeTextEntry1] : Assessment as indicated above in impressions with plans through orders below.  Feder[facility]issa WHITE from [facility]

## 2023-11-07 LAB
BASOPHILS # BLD AUTO: 0.05 K/UL
BASOPHILS NFR BLD AUTO: 0.7 %
EOSINOPHIL # BLD AUTO: 0.13 K/UL
EOSINOPHIL NFR BLD AUTO: 1.8 %
HCT VFR BLD CALC: 44.3 %
HGB BLD-MCNC: 13.6 G/DL
IMM GRANULOCYTES NFR BLD AUTO: 0.4 %
LYMPHOCYTES # BLD AUTO: 2.53 K/UL
LYMPHOCYTES NFR BLD AUTO: 36 %
MAN DIFF?: NORMAL
MCHC RBC-ENTMCNC: 25.2 PG
MCHC RBC-ENTMCNC: 30.7 GM/DL
MCV RBC AUTO: 82 FL
MONOCYTES # BLD AUTO: 0.7 K/UL
MONOCYTES NFR BLD AUTO: 10 %
NEUTROPHILS # BLD AUTO: 3.59 K/UL
NEUTROPHILS NFR BLD AUTO: 51.1 %
PLATELET # BLD AUTO: 215 K/UL
RBC # BLD: 5.4 M/UL
RBC # FLD: 14.5 %
WBC # FLD AUTO: 7.03 K/UL

## 2023-12-01 ENCOUNTER — NON-APPOINTMENT (OUTPATIENT)
Age: 48
End: 2023-12-01

## 2023-12-05 ENCOUNTER — APPOINTMENT (OUTPATIENT)
Dept: RHEUMATOLOGY | Facility: CLINIC | Age: 48
End: 2023-12-05
Payer: MEDICAID

## 2023-12-05 ENCOUNTER — MED ADMIN CHARGE (OUTPATIENT)
Age: 48
End: 2023-12-05

## 2023-12-05 VITALS
OXYGEN SATURATION: 98 % | SYSTOLIC BLOOD PRESSURE: 124 MMHG | DIASTOLIC BLOOD PRESSURE: 84 MMHG | HEART RATE: 77 BPM | WEIGHT: 120 LBS | HEIGHT: 60 IN | BODY MASS INDEX: 23.56 KG/M2 | TEMPERATURE: 98.1 F

## 2023-12-05 DIAGNOSIS — Z92.21 PERSONAL HISTORY OF ANTINEOPLASTIC CHEMOTHERAPY: ICD-10-CM

## 2023-12-05 DIAGNOSIS — M19.90 UNSPECIFIED OSTEOARTHRITIS, UNSPECIFIED SITE: ICD-10-CM

## 2023-12-05 PROCEDURE — G0008: CPT

## 2023-12-05 PROCEDURE — 90686 IIV4 VACC NO PRSV 0.5 ML IM: CPT

## 2023-12-05 PROCEDURE — 99214 OFFICE O/P EST MOD 30 MIN: CPT | Mod: 25

## 2023-12-06 LAB
ALBUMIN SERPL ELPH-MCNC: 5 G/DL
ALP BLD-CCNC: 103 U/L
ALT SERPL-CCNC: 14 U/L
ANION GAP SERPL CALC-SCNC: 11 MMOL/L
APPEARANCE: ABNORMAL
AST SERPL-CCNC: 22 U/L
BACTERIA: ABNORMAL /HPF
BASOPHILS # BLD AUTO: 0.04 K/UL
BASOPHILS NFR BLD AUTO: 0.5 %
BILIRUB SERPL-MCNC: 0.5 MG/DL
BILIRUBIN URINE: NEGATIVE
BLOOD URINE: ABNORMAL
BUN SERPL-MCNC: 12 MG/DL
CALCIUM OXALATE CRYSTALS: PRESENT
CALCIUM SERPL-MCNC: 9.8 MG/DL
CAST: 1 /LPF
CHLORIDE SERPL-SCNC: 104 MMOL/L
CK SERPL-CCNC: 104 U/L
CO2 SERPL-SCNC: 26 MMOL/L
COLOR: YELLOW
CREAT SERPL-MCNC: 0.51 MG/DL
CREAT SPEC-SCNC: 188 MG/DL
CREAT/PROT UR: 0.1 RATIO
CRP SERPL-MCNC: <3 MG/L
EGFR: 116 ML/MIN/1.73M2
EOSINOPHIL # BLD AUTO: 0.2 K/UL
EOSINOPHIL NFR BLD AUTO: 2.5 %
EPITHELIAL CELLS: 33 /HPF
ERYTHROCYTE [SEDIMENTATION RATE] IN BLOOD BY WESTERGREN METHOD: 21 MM/HR
GLUCOSE QUALITATIVE U: NEGATIVE MG/DL
GLUCOSE SERPL-MCNC: 99 MG/DL
HCT VFR BLD CALC: 46.3 %
HGB BLD-MCNC: 14.2 G/DL
IMM GRANULOCYTES NFR BLD AUTO: 0.3 %
KETONES URINE: NEGATIVE MG/DL
LEUKOCYTE ESTERASE URINE: ABNORMAL
LYMPHOCYTES # BLD AUTO: 2.93 K/UL
LYMPHOCYTES NFR BLD AUTO: 36.6 %
MAN DIFF?: NORMAL
MCHC RBC-ENTMCNC: 25 PG
MCHC RBC-ENTMCNC: 30.7 GM/DL
MCV RBC AUTO: 81.4 FL
MICROSCOPIC-UA: NORMAL
MONOCYTES # BLD AUTO: 0.65 K/UL
MONOCYTES NFR BLD AUTO: 8.1 %
NEUTROPHILS # BLD AUTO: 4.16 K/UL
NEUTROPHILS NFR BLD AUTO: 52 %
NITRITE URINE: NEGATIVE
PH URINE: 6
PLATELET # BLD AUTO: 182 K/UL
POTASSIUM SERPL-SCNC: 4.6 MMOL/L
PROT SERPL-MCNC: 7.8 G/DL
PROT UR-MCNC: 16 MG/DL
PROTEIN URINE: NORMAL MG/DL
RBC # BLD: 5.69 M/UL
RBC # FLD: 14.8 %
RED BLOOD CELLS URINE: 8 /HPF
REVIEW: NORMAL
SODIUM SERPL-SCNC: 141 MMOL/L
SPECIFIC GRAVITY URINE: >1.03
UROBILINOGEN URINE: 1 MG/DL
WBC # FLD AUTO: 8 K/UL
WHITE BLOOD CELLS URINE: 1 /HPF

## 2023-12-07 LAB — HYDROXYCHLOROQUINE CONCENTRATION: 133 NG/ML

## 2023-12-08 LAB — URINE CYTOLOGY: NORMAL

## 2024-01-12 ENCOUNTER — RX RENEWAL (OUTPATIENT)
Age: 49
End: 2024-01-12

## 2024-01-12 RX ORDER — HYDROXYCHLOROQUINE SULFATE 200 MG/1
200 TABLET, FILM COATED ORAL
Qty: 90 | Refills: 2 | Status: ACTIVE | COMMUNITY
Start: 2018-11-21 | End: 1900-01-01

## 2024-01-22 ENCOUNTER — APPOINTMENT (OUTPATIENT)
Dept: MAMMOGRAPHY | Facility: CLINIC | Age: 49
End: 2024-01-22
Payer: MEDICAID

## 2024-01-22 ENCOUNTER — OUTPATIENT (OUTPATIENT)
Dept: OUTPATIENT SERVICES | Facility: HOSPITAL | Age: 49
LOS: 1 days | End: 2024-01-22
Payer: COMMERCIAL

## 2024-01-22 ENCOUNTER — RESULT REVIEW (OUTPATIENT)
Age: 49
End: 2024-01-22

## 2024-01-22 DIAGNOSIS — Z98.89 OTHER SPECIFIED POSTPROCEDURAL STATES: Chronic | ICD-10-CM

## 2024-01-22 DIAGNOSIS — Z98.890 OTHER SPECIFIED POSTPROCEDURAL STATES: Chronic | ICD-10-CM

## 2024-01-22 DIAGNOSIS — Z12.31 ENCOUNTER FOR SCREENING MAMMOGRAM FOR MALIGNANT NEOPLASM OF BREAST: ICD-10-CM

## 2024-01-22 PROCEDURE — 77063 BREAST TOMOSYNTHESIS BI: CPT

## 2024-01-22 PROCEDURE — 77063 BREAST TOMOSYNTHESIS BI: CPT | Mod: 26

## 2024-01-22 PROCEDURE — 77067 SCR MAMMO BI INCL CAD: CPT | Mod: 26

## 2024-01-22 PROCEDURE — 77067 SCR MAMMO BI INCL CAD: CPT

## 2024-02-05 ENCOUNTER — APPOINTMENT (OUTPATIENT)
Dept: PULMONOLOGY | Facility: CLINIC | Age: 49
End: 2024-02-05
Payer: COMMERCIAL

## 2024-02-05 VITALS
HEIGHT: 59 IN | WEIGHT: 120 LBS | DIASTOLIC BLOOD PRESSURE: 74 MMHG | BODY MASS INDEX: 24.19 KG/M2 | SYSTOLIC BLOOD PRESSURE: 106 MMHG | OXYGEN SATURATION: 98 % | HEART RATE: 83 BPM

## 2024-02-05 PROCEDURE — 94729 DIFFUSING CAPACITY: CPT

## 2024-02-05 PROCEDURE — 94010 BREATHING CAPACITY TEST: CPT

## 2024-02-05 PROCEDURE — 94726 PLETHYSMOGRAPHY LUNG VOLUMES: CPT

## 2024-02-19 NOTE — ED PROVIDER NOTE - DATE/TIME 1
Pt did not bring her feeding. Checked with the cafeteria but they did not have it. Pt states she would rather not start a new bag anyway because of NPO status after midnight.    17-Jan-2019 18:58

## 2024-02-21 ENCOUNTER — RX RENEWAL (OUTPATIENT)
Age: 49
End: 2024-02-21

## 2024-02-26 ENCOUNTER — RX RENEWAL (OUTPATIENT)
Age: 49
End: 2024-02-26

## 2024-03-05 ENCOUNTER — APPOINTMENT (OUTPATIENT)
Dept: RHEUMATOLOGY | Facility: CLINIC | Age: 49
End: 2024-03-05
Payer: COMMERCIAL

## 2024-03-05 VITALS
OXYGEN SATURATION: 98 % | HEART RATE: 89 BPM | DIASTOLIC BLOOD PRESSURE: 91 MMHG | WEIGHT: 120 LBS | SYSTOLIC BLOOD PRESSURE: 134 MMHG | HEIGHT: 59 IN | BODY MASS INDEX: 24.19 KG/M2

## 2024-03-05 DIAGNOSIS — J84.9 INTERSTITIAL PULMONARY DISEASE, UNSPECIFIED: ICD-10-CM

## 2024-03-05 PROCEDURE — G2211 COMPLEX E/M VISIT ADD ON: CPT

## 2024-03-05 PROCEDURE — 99214 OFFICE O/P EST MOD 30 MIN: CPT

## 2024-03-05 NOTE — ASSESSMENT
[FreeTextEntry1] : Diffuse Systemic Scleroderma with positive TIMO and Raynaud's Overlap myositis, now with right winging scapula and shoulder wekaness rt > left on abduction  ILD, s/p Cytoxan, on Cellcept 1.5 g a day History of nephrolithiasis - had obstructive renal stone HIstory of positive Quantiferon, treated for latent TB in 2015 -2016   = labs = continue CellCept to 1.5 g a day = continue HCQ = ophthalmology follow-up    RTO 4 months or earlier if needed. .

## 2024-03-05 NOTE — HISTORY OF PRESENT ILLNESS
[de-identified] : Last was seen in December, 2023 [___ Month(s) Ago] : [unfilled] month(s) ago [FreeTextEntry1] : Interval HIstory : -------------------- no SOB, no chest pain.  overall was ok, weakness of neck, shoulders, arms - getting better with PT No rash, no oral ulcers  on Hydroxychloroquine 200 mg a day and Cellcept 500 mg TID on Boniva for osteoporosis

## 2024-03-25 ENCOUNTER — RX RENEWAL (OUTPATIENT)
Age: 49
End: 2024-03-25

## 2024-03-25 RX ORDER — NIFEDIPINE 30 MG/1
30 TABLET, EXTENDED RELEASE ORAL DAILY
Qty: 90 | Refills: 3 | Status: ACTIVE | COMMUNITY
Start: 2017-09-27 | End: 1900-01-01

## 2024-05-06 ENCOUNTER — OUTPATIENT (OUTPATIENT)
Dept: OUTPATIENT SERVICES | Facility: HOSPITAL | Age: 49
LOS: 1 days | End: 2024-05-06

## 2024-05-06 ENCOUNTER — LABORATORY RESULT (OUTPATIENT)
Age: 49
End: 2024-05-06

## 2024-05-06 ENCOUNTER — APPOINTMENT (OUTPATIENT)
Dept: INTERNAL MEDICINE | Facility: CLINIC | Age: 49
End: 2024-05-06
Payer: COMMERCIAL

## 2024-05-06 VITALS
HEART RATE: 81 BPM | OXYGEN SATURATION: 100 % | DIASTOLIC BLOOD PRESSURE: 90 MMHG | HEIGHT: 59 IN | SYSTOLIC BLOOD PRESSURE: 147 MMHG | WEIGHT: 126 LBS | BODY MASS INDEX: 25.4 KG/M2

## 2024-05-06 VITALS — SYSTOLIC BLOOD PRESSURE: 118 MMHG | DIASTOLIC BLOOD PRESSURE: 86 MMHG

## 2024-05-06 DIAGNOSIS — E55.9 VITAMIN D DEFICIENCY, UNSPECIFIED: ICD-10-CM

## 2024-05-06 DIAGNOSIS — R35.0 FREQUENCY OF MICTURITION: ICD-10-CM

## 2024-05-06 DIAGNOSIS — M79.602 PAIN IN LEFT ARM: ICD-10-CM

## 2024-05-06 DIAGNOSIS — M81.0 AGE-RELATED OSTEOPOROSIS W/OUT CURRENT PATHOLOGICAL FRACTURE: ICD-10-CM

## 2024-05-06 DIAGNOSIS — M25.511 PAIN IN RIGHT SHOULDER: ICD-10-CM

## 2024-05-06 DIAGNOSIS — Z98.890 OTHER SPECIFIED POSTPROCEDURAL STATES: Chronic | ICD-10-CM

## 2024-05-06 DIAGNOSIS — N91.2 AMENORRHEA, UNSPECIFIED: ICD-10-CM

## 2024-05-06 DIAGNOSIS — Z98.89 OTHER SPECIFIED POSTPROCEDURAL STATES: Chronic | ICD-10-CM

## 2024-05-06 DIAGNOSIS — Z87.898 PERSONAL HISTORY OF OTHER SPECIFIED CONDITIONS: ICD-10-CM

## 2024-05-06 DIAGNOSIS — R03.0 ELEVATED BLOOD-PRESSURE READING, W/OUT DIAGNOSIS OF HYPERTENSION: ICD-10-CM

## 2024-05-06 DIAGNOSIS — R10.31 RIGHT LOWER QUADRANT PAIN: ICD-10-CM

## 2024-05-06 DIAGNOSIS — Z78.0 ASYMPTOMATIC MENOPAUSAL STATE: ICD-10-CM

## 2024-05-06 DIAGNOSIS — E03.9 HYPOTHYROIDISM, UNSPECIFIED: ICD-10-CM

## 2024-05-06 PROCEDURE — 99214 OFFICE O/P EST MOD 30 MIN: CPT | Mod: 25

## 2024-05-06 PROCEDURE — G2211 COMPLEX E/M VISIT ADD ON: CPT | Mod: NC,1L

## 2024-05-06 RX ORDER — OMEPRAZOLE 20 MG/1
20 CAPSULE, DELAYED RELEASE ORAL
Qty: 90 | Refills: 2 | Status: COMPLETED | COMMUNITY
Start: 2023-03-23 | End: 2024-05-01

## 2024-05-06 RX ORDER — BLOOD PRESSURE TEST KIT-MEDIUM
KIT MISCELLANEOUS
Qty: 1 | Refills: 1 | Status: ACTIVE | COMMUNITY
Start: 2024-05-06 | End: 1900-01-01

## 2024-05-06 RX ORDER — FAMOTIDINE 20 MG/1
20 TABLET, FILM COATED ORAL
Qty: 90 | Refills: 3 | Status: COMPLETED | COMMUNITY
Start: 2020-08-26 | End: 2024-05-01

## 2024-05-06 RX ORDER — PSYLLIUM HUSK 0.4 G
1000-20 CAPSULE ORAL TWICE DAILY
Qty: 90 | Refills: 3 | Status: ACTIVE | COMMUNITY
Start: 2021-01-13 | End: 1900-01-01

## 2024-05-06 RX ORDER — CYCLOBENZAPRINE HYDROCHLORIDE 5 MG/1
5 TABLET, FILM COATED ORAL
Qty: 30 | Refills: 0 | Status: COMPLETED | COMMUNITY
Start: 2023-07-12 | End: 2024-02-01

## 2024-05-06 RX ORDER — IBANDRONATE SODIUM 150 MG/1
150 TABLET ORAL
Qty: 1 | Refills: 3 | Status: ACTIVE | COMMUNITY
Start: 2022-07-27 | End: 1900-01-01

## 2024-05-06 NOTE — PHYSICAL EXAM
[No Acute Distress] : no acute distress [Normal Voice/Communication] : normal voice/communication [Normal Sclera/Conjunctiva] : normal sclera/conjunctiva [No Respiratory Distress] : no respiratory distress  [Normal Affect] : the affect was normal [Normal Mood] : the mood was normal

## 2024-05-08 DIAGNOSIS — M35.1 OTHER OVERLAP SYNDROMES: ICD-10-CM

## 2024-05-08 DIAGNOSIS — R03.0 ELEVATED BLOOD-PRESSURE READING, WITHOUT DIAGNOSIS OF HYPERTENSION: ICD-10-CM

## 2024-05-08 DIAGNOSIS — R35.0 FREQUENCY OF MICTURITION: ICD-10-CM

## 2024-05-08 DIAGNOSIS — E03.9 HYPOTHYROIDISM, UNSPECIFIED: ICD-10-CM

## 2024-05-08 DIAGNOSIS — M81.0 AGE-RELATED OSTEOPOROSIS WITHOUT CURRENT PATHOLOGICAL FRACTURE: ICD-10-CM

## 2024-05-08 DIAGNOSIS — Z78.0 ASYMPTOMATIC MENOPAUSAL STATE: ICD-10-CM

## 2024-05-08 DIAGNOSIS — E55.9 VITAMIN D DEFICIENCY, UNSPECIFIED: ICD-10-CM

## 2024-05-08 NOTE — HEALTH RISK ASSESSMENT
[PHQ-2 Negative - No further assessment needed] : PHQ-2 Negative - No further assessment needed [[id number]] : 0

## 2024-05-08 NOTE — ASSESSMENT
[FreeTextEntry1] : Assessment as indicated above in impressions with plans through orders below. Lab orders placed; serum being drawn in office today.

## 2024-05-08 NOTE — REVIEW OF SYSTEMS
[Palpitations] : palpitations [Frequency] : frequency [Headache] : headache [Anxiety] : anxiety [Fever] : no fever [Chills] : no chills [Dysuria] : no dysuria [Incontinence] : no incontinence [Depression] : no depression

## 2024-05-08 NOTE — HISTORY OF PRESENT ILLNESS
[FreeTextEntry1] : urinating frequently and feels blood pressure has been elevated.  [de-identified] : Patient presents for follow up on osteoporosis with concerns about urinating frequently at night.    She ran out of the osteoporosis medication about 2 months ago.  She is in need of vitamin D as well.  She didn't get a chance to have labs done prior to today's office visit but is willing to have them drawn during the visit.  She reports also noticing that her blood pressure feels as if it has been elevated, sometimes with a little headache, feels as if she is more anxious than usual.  She doesn't have a blood pressure machine to check on her blood pressure at home to find out if elevated.  She is feeling that today's reading from the  may be accurate as she feels as if her blood pressure is high.  She is also noticing that she is urinating frequently at night and also having some pain in her lower back that she is wondering if the kidney stone is returning.  She would like a referral back to Urology for further evaluation.  She does seem to be drinking more fluids later in the day but avoids drinking too much after 8 pm.

## 2024-05-20 ENCOUNTER — TRANSCRIPTION ENCOUNTER (OUTPATIENT)
Age: 49
End: 2024-05-20

## 2024-05-20 DIAGNOSIS — Z23 ENCOUNTER FOR IMMUNIZATION: ICD-10-CM

## 2024-05-20 LAB
25(OH)D3 SERPL-MCNC: 22.7 NG/ML
ALBUMIN SERPL ELPH-MCNC: 5 G/DL
ALP BLD-CCNC: 118 U/L
ALT SERPL-CCNC: 20 U/L
ANION GAP SERPL CALC-SCNC: 12 MMOL/L
APPEARANCE: CLEAR
AST SERPL-CCNC: 22 U/L
BASOPHILS # BLD AUTO: 0.05 K/UL
BASOPHILS NFR BLD AUTO: 0.6 %
BILIRUB SERPL-MCNC: 0.4 MG/DL
BILIRUBIN URINE: NEGATIVE
BLOOD URINE: ABNORMAL
BUN SERPL-MCNC: 11 MG/DL
CALCIUM SERPL-MCNC: 10 MG/DL
CHLORIDE SERPL-SCNC: 104 MMOL/L
CO2 SERPL-SCNC: 27 MMOL/L
COLOR: YELLOW
CREAT SERPL-MCNC: 0.48 MG/DL
EGFR: 117 ML/MIN/1.73M2
EOSINOPHIL # BLD AUTO: 0.15 K/UL
EOSINOPHIL NFR BLD AUTO: 1.7 %
ESTIMATED AVERAGE GLUCOSE: 100 MG/DL
GLUCOSE QUALITATIVE U: NEGATIVE MG/DL
GLUCOSE SERPL-MCNC: 110 MG/DL
HBA1C MFR BLD HPLC: 5.1 %
HBV CORE IGG+IGM SER QL: NONREACTIVE
HBV SURFACE AB SER QL: NONREACTIVE
HCT VFR BLD CALC: 44.6 %
HGB BLD-MCNC: 13.7 G/DL
IMM GRANULOCYTES NFR BLD AUTO: 0.3 %
KETONES URINE: NEGATIVE MG/DL
LEUKOCYTE ESTERASE URINE: ABNORMAL
LYMPHOCYTES # BLD AUTO: 2.93 K/UL
LYMPHOCYTES NFR BLD AUTO: 33.6 %
MAN DIFF?: NORMAL
MCHC RBC-ENTMCNC: 25.8 PG
MCHC RBC-ENTMCNC: 30.7 GM/DL
MCV RBC AUTO: 84.2 FL
MONOCYTES # BLD AUTO: 0.81 K/UL
MONOCYTES NFR BLD AUTO: 9.3 %
NEUTROPHILS # BLD AUTO: 4.75 K/UL
NEUTROPHILS NFR BLD AUTO: 54.5 %
NITRITE URINE: NEGATIVE
PH URINE: 7
PLATELET # BLD AUTO: 189 K/UL
POTASSIUM SERPL-SCNC: 4.9 MMOL/L
PROT SERPL-MCNC: 7.4 G/DL
PROTEIN URINE: NEGATIVE MG/DL
RBC # BLD: 5.3 M/UL
RBC # FLD: 14.9 %
SODIUM SERPL-SCNC: 143 MMOL/L
SPECIFIC GRAVITY URINE: 1.01
T4 FREE SERPL-MCNC: 1.8 NG/DL
TSH SERPL-ACNC: 0.06 UIU/ML
UROBILINOGEN URINE: 0.2 MG/DL
WBC # FLD AUTO: 8.72 K/UL

## 2024-05-20 RX ORDER — ERGOCALCIFEROL 1.25 MG/1
1.25 MG CAPSULE, LIQUID FILLED ORAL
Qty: 9 | Refills: 0 | Status: COMPLETED | COMMUNITY
Start: 2023-07-21 | End: 2024-05-01

## 2024-05-20 RX ORDER — LEVOTHYROXINE SODIUM 0.07 MG/1
75 TABLET ORAL DAILY
Qty: 90 | Refills: 3 | Status: ACTIVE | COMMUNITY
Start: 2020-07-08 | End: 1900-01-01

## 2024-07-02 ENCOUNTER — LABORATORY RESULT (OUTPATIENT)
Age: 49
End: 2024-07-02

## 2024-07-02 ENCOUNTER — APPOINTMENT (OUTPATIENT)
Dept: RHEUMATOLOGY | Facility: CLINIC | Age: 49
End: 2024-07-02
Payer: COMMERCIAL

## 2024-07-02 VITALS
RESPIRATION RATE: 15 BRPM | OXYGEN SATURATION: 98 % | DIASTOLIC BLOOD PRESSURE: 89 MMHG | HEIGHT: 59 IN | HEART RATE: 75 BPM | WEIGHT: 126 LBS | SYSTOLIC BLOOD PRESSURE: 139 MMHG | BODY MASS INDEX: 25.4 KG/M2

## 2024-07-02 DIAGNOSIS — R19.7 DIARRHEA, UNSPECIFIED: ICD-10-CM

## 2024-07-02 DIAGNOSIS — Z79.899 OTHER LONG TERM (CURRENT) DRUG THERAPY: ICD-10-CM

## 2024-07-02 DIAGNOSIS — M35.1 OTHER OVERLAP SYNDROMES: ICD-10-CM

## 2024-07-02 DIAGNOSIS — M34.9 SYSTEMIC SCLEROSIS, UNSPECIFIED: ICD-10-CM

## 2024-07-02 PROCEDURE — 99214 OFFICE O/P EST MOD 30 MIN: CPT | Mod: GC

## 2024-07-02 PROCEDURE — G2211 COMPLEX E/M VISIT ADD ON: CPT | Mod: NC

## 2024-07-04 LAB
ALBUMIN SERPL ELPH-MCNC: 5 G/DL
ALP BLD-CCNC: 133 U/L
ALT SERPL-CCNC: 17 U/L
ANION GAP SERPL CALC-SCNC: 12 MMOL/L
APPEARANCE: CLEAR
AST SERPL-CCNC: 22 U/L
BACTERIA: NEGATIVE /HPF
BASOPHILS # BLD AUTO: 0.05 K/UL
BASOPHILS NFR BLD AUTO: 0.6 %
BILIRUB SERPL-MCNC: 0.8 MG/DL
BILIRUBIN URINE: NEGATIVE
BLOOD URINE: NEGATIVE
BUN SERPL-MCNC: 9 MG/DL
CALCIUM SERPL-MCNC: 10.4 MG/DL
CAST: 0 /LPF
CELIACPAN: NORMAL
CHLORIDE SERPL-SCNC: 104 MMOL/L
CK SERPL-CCNC: 103 U/L
CO2 SERPL-SCNC: 28 MMOL/L
COLOR: YELLOW
CREAT SERPL-MCNC: 0.6 MG/DL
CREAT SPEC-SCNC: 9 MG/DL
CREAT/PROT UR: NORMAL RATIO
DEPRECATED KAPPA LC FREE/LAMBDA SER: 1.38 RATIO
EGFR: 111 ML/MIN/1.73M2
EOSINOPHIL # BLD AUTO: 0.13 K/UL
EOSINOPHIL NFR BLD AUTO: 1.6 %
EPITHELIAL CELLS: 4 /HPF
GLUCOSE QUALITATIVE U: NEGATIVE MG/DL
GLUCOSE SERPL-MCNC: 91 MG/DL
HCT VFR BLD CALC: 46 %
HGB BLD-MCNC: 14.7 G/DL
IGA SER QL IEP: 170 MG/DL
IGG SER QL IEP: 1277 MG/DL
IGM SER QL IEP: 123 MG/DL
IMM GRANULOCYTES NFR BLD AUTO: 0.8 %
KAPPA LC CSF-MCNC: 1.43 MG/DL
KAPPA LC SERPL-MCNC: 1.97 MG/DL
KETONES URINE: NEGATIVE MG/DL
LEUKOCYTE ESTERASE URINE: ABNORMAL
LYMPHOCYTES # BLD AUTO: 2.85 K/UL
LYMPHOCYTES NFR BLD AUTO: 35.7 %
MAN DIFF?: NORMAL
MCHC RBC-ENTMCNC: 25.8 PG
MCHC RBC-ENTMCNC: 32 GM/DL
MCV RBC AUTO: 80.8 FL
MICROSCOPIC-UA: NORMAL
MONOCYTES # BLD AUTO: 0.73 K/UL
MONOCYTES NFR BLD AUTO: 9.1 %
NEUTROPHILS # BLD AUTO: 4.16 K/UL
NEUTROPHILS NFR BLD AUTO: 52.2 %
NITRITE URINE: NEGATIVE
PH URINE: 7
PLATELET # BLD AUTO: 199 K/UL
POTASSIUM SERPL-SCNC: 4.7 MMOL/L
PROT SERPL-MCNC: 8 G/DL
PROT UR-MCNC: <4 MG/DL
PROTEIN URINE: NEGATIVE MG/DL
RBC # BLD: 5.69 M/UL
RBC # FLD: 14.4 %
RED BLOOD CELLS URINE: 1 /HPF
REVIEW: NORMAL
SODIUM SERPL-SCNC: 144 MMOL/L
SPECIFIC GRAVITY URINE: <1.005
UROBILINOGEN URINE: 0.2 MG/DL
WBC # FLD AUTO: 7.98 K/UL
WHITE BLOOD CELLS URINE: 1 /HPF

## 2024-07-23 ENCOUNTER — APPOINTMENT (OUTPATIENT)
Dept: RADIOLOGY | Facility: CLINIC | Age: 49
End: 2024-07-23
Payer: COMMERCIAL

## 2024-07-23 PROCEDURE — 77080 DXA BONE DENSITY AXIAL: CPT | Mod: 26

## 2024-08-12 NOTE — ASU PREOP CHECKLIST - PATIENT SENT TO
Called to confirm appt with Dr. Carreon tomorrow 8/13 at 0910. Mom verbally confirmed appt date and time.   
endoscopy

## 2024-08-19 ENCOUNTER — LABORATORY RESULT (OUTPATIENT)
Age: 49
End: 2024-08-19

## 2024-08-19 ENCOUNTER — APPOINTMENT (OUTPATIENT)
Dept: INTERNAL MEDICINE | Facility: CLINIC | Age: 49
End: 2024-08-19
Payer: COMMERCIAL

## 2024-08-19 ENCOUNTER — OUTPATIENT (OUTPATIENT)
Dept: OUTPATIENT SERVICES | Facility: HOSPITAL | Age: 49
LOS: 1 days | End: 2024-08-19

## 2024-08-19 VITALS — SYSTOLIC BLOOD PRESSURE: 118 MMHG | DIASTOLIC BLOOD PRESSURE: 78 MMHG

## 2024-08-19 VITALS
HEART RATE: 76 BPM | BODY MASS INDEX: 25.4 KG/M2 | WEIGHT: 126 LBS | HEIGHT: 59 IN | SYSTOLIC BLOOD PRESSURE: 102 MMHG | OXYGEN SATURATION: 96 % | DIASTOLIC BLOOD PRESSURE: 80 MMHG

## 2024-08-19 DIAGNOSIS — R03.0 ELEVATED BLOOD-PRESSURE READING, W/OUT DIAGNOSIS OF HYPERTENSION: ICD-10-CM

## 2024-08-19 DIAGNOSIS — Z98.890 OTHER SPECIFIED POSTPROCEDURAL STATES: Chronic | ICD-10-CM

## 2024-08-19 DIAGNOSIS — R41.3 OTHER AMNESIA: ICD-10-CM

## 2024-08-19 DIAGNOSIS — M79.604 PAIN IN RIGHT LEG: ICD-10-CM

## 2024-08-19 DIAGNOSIS — Z98.89 OTHER SPECIFIED POSTPROCEDURAL STATES: Chronic | ICD-10-CM

## 2024-08-19 DIAGNOSIS — E03.9 HYPOTHYROIDISM, UNSPECIFIED: ICD-10-CM

## 2024-08-19 DIAGNOSIS — M79.605 PAIN IN RIGHT LEG: ICD-10-CM

## 2024-08-19 PROCEDURE — G2211 COMPLEX E/M VISIT ADD ON: CPT | Mod: NC

## 2024-08-19 PROCEDURE — 99213 OFFICE O/P EST LOW 20 MIN: CPT

## 2024-08-21 PROBLEM — R03.0 ELEVATED BLOOD PRESSURE READING WITHOUT DIAGNOSIS OF HYPERTENSION: Status: RESOLVED | Noted: 2024-05-06 | Resolved: 2024-08-21

## 2024-08-21 RX ORDER — LEVOTHYROXINE SODIUM 0.09 MG/1
88 TABLET ORAL
Qty: 90 | Refills: 0 | Status: DISCONTINUED | COMMUNITY
Start: 2024-08-15

## 2024-08-21 NOTE — REVIEW OF SYSTEMS
[Fever] : no fever [Chills] : no chills [Chest Pain] : no chest pain [Abdominal Pain] : no abdominal pain [Diarrhea] : diarrhea [FreeTextEntry9] : see HPI

## 2024-08-21 NOTE — HISTORY OF PRESENT ILLNESS
[FreeTextEntry1] : leg pains and blood pressure follow up. [de-identified] : Leg pains, below the knees, can't sit for too long or stand for too long, as pain comes on. She recently had a reduction of her cellcept due to having diarrhea which has improved with the adjustment.  Hypothyroidism: received a prescription for 88 mcg along with the 75 mcg that she is currently taking.  She is not taking the 88 mcg but wants clarification on what she should be taking.  Continues to take it first thing in the morning without any other medications or food.

## 2024-08-21 NOTE — HISTORY OF PRESENT ILLNESS
[FreeTextEntry1] : leg pains and blood pressure follow up. [de-identified] : Leg pains, below the knees, can't sit for too long or stand for too long, as pain comes on. She recently had a reduction of her cellcept due to having diarrhea which has improved with the adjustment.  Hypothyroidism: received a prescription for 88 mcg along with the 75 mcg that she is currently taking.  She is not taking the 88 mcg but wants clarification on what she should be taking.  Continues to take it first thing in the morning without any other medications or food.

## 2024-08-21 NOTE — ASSESSMENT
[FreeTextEntry1] : Assessment as indicated above in impressions with plans through orders below. Lab orders placed; serum being drawn in office today. Elevated alkaline phosphatase and free kappa light chain noticed in July labs, will repeat today.

## 2024-08-26 DIAGNOSIS — M79.604 PAIN IN RIGHT LEG: ICD-10-CM

## 2024-08-26 DIAGNOSIS — E03.9 HYPOTHYROIDISM, UNSPECIFIED: ICD-10-CM

## 2024-08-28 LAB
25(OH)D3 SERPL-MCNC: 19.5 NG/ML
ALBUMIN SERPL ELPH-MCNC: 4.7 G/DL
ALP BLD-CCNC: 115 U/L
ALT SERPL-CCNC: 16 U/L
ANION GAP SERPL CALC-SCNC: 12 MMOL/L
AST SERPL-CCNC: 25 U/L
BASOPHILS # BLD AUTO: 0.04 K/UL
BASOPHILS NFR BLD AUTO: 0.4 %
BILIRUB SERPL-MCNC: 0.5 MG/DL
BUN SERPL-MCNC: 13 MG/DL
CALCIUM SERPL-MCNC: 9.6 MG/DL
CHLORIDE SERPL-SCNC: 104 MMOL/L
CO2 SERPL-SCNC: 26 MMOL/L
CREAT SERPL-MCNC: 0.49 MG/DL
DEPRECATED KAPPA LC FREE/LAMBDA SER: 1.35 RATIO
EGFR: 117 ML/MIN/1.73M2
EOSINOPHIL # BLD AUTO: 0.18 K/UL
EOSINOPHIL NFR BLD AUTO: 1.7 %
FOLATE SERPL-MCNC: 16.1 NG/ML
GLUCOSE SERPL-MCNC: 100 MG/DL
HCT VFR BLD CALC: 43.1 %
HGB BLD-MCNC: 13.6 G/DL
IGA SER QL IEP: 142 MG/DL
IGG SER QL IEP: 1132 MG/DL
IGM SER QL IEP: 116 MG/DL
IMM GRANULOCYTES NFR BLD AUTO: 0.3 %
KAPPA LC CSF-MCNC: 1.54 MG/DL
KAPPA LC SERPL-MCNC: 2.08 MG/DL
LYMPHOCYTES # BLD AUTO: 3.09 K/UL
LYMPHOCYTES NFR BLD AUTO: 29.5 %
M PROTEIN SPEC IFE-MCNC: NORMAL
MAN DIFF?: NORMAL
MCHC RBC-ENTMCNC: 26.1 PG
MCHC RBC-ENTMCNC: 31.6 GM/DL
MCV RBC AUTO: 82.6 FL
MONOCYTES # BLD AUTO: 1 K/UL
MONOCYTES NFR BLD AUTO: 9.6 %
NEUTROPHILS # BLD AUTO: 6.13 K/UL
NEUTROPHILS NFR BLD AUTO: 58.5 %
PLATELET # BLD AUTO: 169 K/UL
POTASSIUM SERPL-SCNC: 5.1 MMOL/L
PROT SERPL-MCNC: 7.3 G/DL
RBC # BLD: 5.22 M/UL
RBC # FLD: 14.8 %
SODIUM SERPL-SCNC: 142 MMOL/L
TSH SERPL-ACNC: 0.12 UIU/ML
VIT B12 SERPL-MCNC: 696 PG/ML
WBC # FLD AUTO: 10.47 K/UL

## 2024-09-20 ENCOUNTER — APPOINTMENT (OUTPATIENT)
Dept: INTERNAL MEDICINE | Facility: CLINIC | Age: 49
End: 2024-09-20

## 2024-10-17 NOTE — REVIEW OF SYSTEMS
Patient : Sam Bolanos Age: 31 year old Sex: male   MRN: [id number] Encounter Date: 10/17/2024    History     Chief Complaint   Patient presents with    Sore Throat    Flu Like Symptoms       31-year-old male, current tobacco smoker, who presented to the emergency department with 3 days complaint of sore throat, body aches, cough and congestion.    Patient denies any known sick contacts, worsening productive cough, with associated shortness of breath/wheezing.  Patient previously has been prescribed albuterol inhaler, does not currently have albuterol inhaler.   + Sore throat  + Congestion  + Subjective fever and chills    Patient is current tobacco smoker, [employer], is considering quitting/tobacco cessation.         Past/Family/Social History     Allergies   Allergen Reactions    Motrin VOMITING       No current facility-administered medications for this encounter.     Current Outpatient Medications   Medication Sig    albuterol 108 (90 Base) MCG/ACT inhaler Inhale 2 puffs into the lungs every 4 hours as needed for Wheezing.    nicotine (Nicoderm CQ) 14 MG/24HR patch Place 1 patch onto the skin every 24 hours.    HYDROcodone-acetaminophen (NORCO) 5-325 MG per tablet Take 1 tablet by mouth every 8 hours as needed for Pain.    naproxen (NAPROSYN) 500 MG tablet Take 1 tablet by mouth 2 times daily (with meals).    penicillin v potassium (VEETID) 500 MG tablet Take 1 tablet by mouth 4 times daily.    traMADol (ULTRAM) 50 MG tablet Take 1 tablet by mouth every 6 hours as needed for Pain.       No past medical history on file.    No past surgical history on file.    No family history on file.    Social History     Tobacco Use    Smoking status: Every Day     Current packs/day: 0.50     Types: Cigarettes    Smokeless tobacco: Never   Substance Use Topics    Alcohol use: No    Drug use: No          Review of Systems   Review of Symptoms     Review of Systems   Constitutional:  Positive for chills and fever.    HENT:  Positive for congestion and sore throat.    Respiratory:  Positive for cough, chest tightness, shortness of breath and wheezing.    Cardiovascular:  Negative for chest pain.   Gastrointestinal:  Negative for abdominal pain, nausea and vomiting.   Genitourinary:  Negative for dysuria.   Musculoskeletal:  Negative for myalgias, neck pain and neck stiffness.   Skin:  Negative for rash.   Neurological:  Negative for weakness and numbness.   Hematological: Negative.    Psychiatric/Behavioral: Negative.            Physical Exam   Physical Exam     ED Triage Vitals [10/17/24 1119]   ED Triage Vitals Group      Temp 99.2 °F (37.3 °C)      Heart Rate 98      Resp 18      BP (!) 146/86      SpO2 98 %      EtCO2 mmHg       Height 5' 9\" (1.753 m)      Weight 279 lb 12.2 oz (126.9 kg)      Weight Scale Used Scale in bed      BMI (Calculated) 41.31      IBW/kg (Calculated) 70.7       Physical Exam  Constitutional:       General: He is not in acute distress.     Appearance: He is obese. He is not toxic-appearing.   HENT:      Head: Normocephalic and atraumatic.      Nose: Congestion present.      Mouth/Throat:      Mouth: Mucous membranes are moist.      Pharynx: Pharyngeal swelling and posterior oropharyngeal erythema present. No oropharyngeal exudate.      Tonsils: No tonsillar abscesses.   Eyes:      Extraocular Movements: Extraocular movements intact.      Pupils: Pupils are equal, round, and reactive to light.   Cardiovascular:      Rate and Rhythm: Normal rate and regular rhythm.      Pulses: Normal pulses.      Heart sounds: No murmur heard.  Pulmonary:      Effort: Pulmonary effort is normal. No respiratory distress.      Breath sounds: Wheezing (apical wheezing) present.   Abdominal:      General: Abdomen is flat. There is no distension.      Palpations: Abdomen is soft.      Tenderness: There is no abdominal tenderness.   Musculoskeletal:         General: No deformity. Normal range of motion.      Cervical back:  Normal range of motion. No rigidity.   Skin:     General: Skin is warm and dry.      Findings: No rash.   Neurological:      General: No focal deficit present.      Mental Status: He is alert and oriented to person, place, and time.      Cranial Nerves: No cranial nerve deficit.   Psychiatric:         Mood and Affect: Mood normal.         Behavior: Behavior normal.            Procedures   ED Procedures     Procedures     Lab Results   ED Lab   Results for orders placed or performed during the hospital encounter of 10/17/24   COVID/Flu/RSV panel    Specimen: Nasal, Mid-turbinate; Swab   Result Value Ref Range    Rapid SARS-COV-2 by PCR Not Detected Not Detected / Detected / Presumptive Positive / Inhibitors present    Influenza A by PCR Not Detected Not Detected    Influenza B by PCR Not Detected Not Detected    RSV BY PCR Not Detected Not Detected    Isolation Guidelines      Procedural Comment     Streptococcus group A PCR    Specimen: Throat; Swab   Result Value Ref Range    STREPTOCOCCUS GROUP A PCR Not Detected Not Detected            Radiology Results   ED Radiology Results     Imaging Results              XR CHEST PA OR AP 1 VIEW (Final result)  Result time 10/17/24 11:38:57      Final result                   Impression:    IMPRESSION:  1. Unremarkable single view of the chest.    Electronically Signed by: Stuart Vidal MD  Signed on: 10/17/2024 11:38 AM  Created on Workstation ID: [id number]  Signed on Workstation ID: [id number]               Narrative:    EXAM: XR CHEST AP OR PA    DATE: 10/17/2024 11:31 AM    INDICATIONS:    cough, fever    COMPARISON: None    FINDINGS: The cardiovascular silhouette and vasculature are normal. The  lung volumes are normal. There is no effusion or consolidation. There is no  pneumothorax.  The regional skeleton is unremarkable.                                           ED Medications   ED Medications     ED Medication Orders (From admission, onward)      Ordered Start      Status Ordering Provider    10/17/24 1128 10/17/24 1129  acetaminophen (TYLENOL) tablet 1,000 mg  ONCE         Last MAR action: Given HASVANESSA GARCIA    10/17/24 1128 10/17/24 1129  dexAMETHasone (DECADRON) injection 10 mg  ONCE        Note to Pharmacy: ** For ED use ONLY ** For inpatients, use oral tablets or oral solution (peds only).    Last MAR action: Given CADENCEJOSE RAULINE VANESSA                 ED Course     Vitals:    10/17/24 1119   BP: (!) 146/86   BP Location: RUE - Right upper extremity   Patient Position: Supine   Pulse: 98   Resp: 18   Temp: 99.2 °F (37.3 °C)   TempSrc: Oral   SpO2: 98%   Weight: 126.9 kg (279 lb 12.2 oz)   Height: 5' 9\" (1.753 m)            Radiology Review: I have independently interpreted the Chest X-Ray and have found No acute or active disease.  I am awaiting on the final radiology read.      Medical Decision Making  31-year-old male, current tobacco smoker, who presented to the emergency department with 3 days complaint of sore throat, body aches, cough and congestion.  - Differential diagnosis on ED arrival (most likely based on history and exam, broader differential considered): Viral syndrome, viral pharyngitis, strep pharyngitis, tobacco smoking, RAD viral cough/wheezing  -Symptoms consistent with viral URI  -Chest x-ray clear  -COVID/flu/RSV negative, strep PCR negative  -Tylenol and oral dexamethasone given in emergency department for symptomatic treatment  -Prescription for albuterol inhaler and nicotine patches provided        -Patient was counseled on tobacco cessation for 5 minutes, was agreeable to prescription for nicotine patches, currently smoking Jamil cigarettes (half pack per day), wheezing symptoms exacerbated by viral illness and tobacco smoking in today's session, this was discussed with patient in the form of counseling who was agreeable to attempting to quit smoking.     - No indication for further workup or admission, discharge to home with return precautions,  referral to primary care provided.                                          Disposition       Clinical Impression and Diagnosis  12:14 PM       ED Diagnoses       Diagnosis Comment Associated Orders       Final diagnoses    Viral syndrome -- SERVICE TO FAMILY PRACTICE      Reactive airway disease without complication, unspecified asthma severity, unspecified whether persistent (CMD) -- SERVICE TO FAMILY PRACTICE              Follow Up:  No follow-up provider specified.        Summary of your Discharge Medications        Take these Medications        Details   albuterol 108 (90 Base) MCG/ACT inhaler   Inhale 2 puffs into the lungs every 4 hours as needed for Wheezing.     nicotine 14 MG/24HR patch  Commonly known as: Nicoderm CQ   Place 1 patch onto the skin every 24 hours.              Pt is discharged to home/self care in stable condition.              Discharge 10/17/2024 12:15 PM  Sam Bolanos discharge to home/self care.                       Handy Brock MD  10/17/24 1952     [Feeling Fatigued] : feeling fatigued [Recent Weight Loss (___ Lbs)] : recent [unfilled] ~Ulb weight loss [see HPI] : see HPI [Dyspnea on exertion] : dyspnea during exertion [Joint Pain] : joint pain [Joint Stiffness] : joint stiffness [Skin: A Rash] : rash: [Negative] : Heme/Lymph [Chest Pain] : no chest pain [Lower Ext Edema] : no extremity edema [Palpitations] : no palpitations

## 2024-12-04 ENCOUNTER — LABORATORY RESULT (OUTPATIENT)
Age: 49
End: 2024-12-04

## 2024-12-04 ENCOUNTER — APPOINTMENT (OUTPATIENT)
Dept: RHEUMATOLOGY | Facility: CLINIC | Age: 49
End: 2024-12-04
Payer: MEDICAID

## 2024-12-04 VITALS
WEIGHT: 123 LBS | BODY MASS INDEX: 24.8 KG/M2 | RESPIRATION RATE: 16 BRPM | HEART RATE: 96 BPM | OXYGEN SATURATION: 98 % | HEIGHT: 59 IN | DIASTOLIC BLOOD PRESSURE: 108 MMHG | SYSTOLIC BLOOD PRESSURE: 152 MMHG

## 2024-12-04 DIAGNOSIS — M79.605 PAIN IN RIGHT LEG: ICD-10-CM

## 2024-12-04 DIAGNOSIS — J84.9 INTERSTITIAL PULMONARY DISEASE, UNSPECIFIED: ICD-10-CM

## 2024-12-04 DIAGNOSIS — M79.604 PAIN IN RIGHT LEG: ICD-10-CM

## 2024-12-04 DIAGNOSIS — M34.9 SYSTEMIC SCLEROSIS, UNSPECIFIED: ICD-10-CM

## 2024-12-04 PROCEDURE — 99214 OFFICE O/P EST MOD 30 MIN: CPT | Mod: GC

## 2024-12-04 RX ORDER — DULOXETINE HYDROCHLORIDE 30 MG/1
30 CAPSULE, DELAYED RELEASE PELLETS ORAL
Qty: 90 | Refills: 0 | Status: ACTIVE | COMMUNITY
Start: 2024-12-04 | End: 1900-01-01

## 2024-12-05 NOTE — ED PROVIDER NOTE - PMH
While doing confirmation calls the day prior to scheduled appointment. Left a voicemail with appointment date and time. Documenting the left voicemail with a telephone encounter.    
Hypothyroidism    Localized enlarged lymph nodes    Rheumatoid arteritis    Scleroderma

## 2024-12-12 LAB
ALBUMIN SERPL ELPH-MCNC: 4.9 G/DL
ALP BLD-CCNC: 114 U/L
ALT SERPL-CCNC: 16 U/L
ANION GAP SERPL CALC-SCNC: 12 MMOL/L
APPEARANCE: CLEAR
AST SERPL-CCNC: 23 U/L
BACTERIA: NEGATIVE /HPF
BASOPHILS # BLD AUTO: 0.06 K/UL
BASOPHILS NFR BLD AUTO: 0.6 %
BILIRUB SERPL-MCNC: 0.5 MG/DL
BILIRUBIN URINE: NEGATIVE
BLOOD URINE: NEGATIVE
BUN SERPL-MCNC: 9 MG/DL
CALCIUM SERPL-MCNC: 9.7 MG/DL
CAST: 0 /LPF
CHLORIDE SERPL-SCNC: 104 MMOL/L
CK SERPL-CCNC: 166 U/L
CO2 SERPL-SCNC: 25 MMOL/L
COLOR: YELLOW
CREAT SERPL-MCNC: 0.47 MG/DL
CREAT SPEC-SCNC: 17 MG/DL
CREAT/PROT UR: NORMAL RATIO
EGFR: 117 ML/MIN/1.73M2
EOSINOPHIL # BLD AUTO: 0.18 K/UL
EOSINOPHIL NFR BLD AUTO: 1.8 %
EPITHELIAL CELLS: 1 /HPF
GLUCOSE QUALITATIVE U: NEGATIVE MG/DL
GLUCOSE SERPL-MCNC: 99 MG/DL
HCT VFR BLD CALC: 43.8 %
HGB BLD-MCNC: 14 G/DL
IMM GRANULOCYTES NFR BLD AUTO: 0.4 %
KETONES URINE: NEGATIVE MG/DL
LEUKOCYTE ESTERASE URINE: ABNORMAL
LYMPHOCYTES # BLD AUTO: 3.72 K/UL
LYMPHOCYTES NFR BLD AUTO: 37.3 %
MAN DIFF?: NORMAL
MCHC RBC-ENTMCNC: 26 PG
MCHC RBC-ENTMCNC: 32 G/DL
MCV RBC AUTO: 81.4 FL
MICROSCOPIC-UA: NORMAL
MONOCYTES # BLD AUTO: 0.89 K/UL
MONOCYTES NFR BLD AUTO: 8.9 %
NEUTROPHILS # BLD AUTO: 5.08 K/UL
NEUTROPHILS NFR BLD AUTO: 51 %
NITRITE URINE: NEGATIVE
PH URINE: 6
PLATELET # BLD AUTO: 238 K/UL
POTASSIUM SERPL-SCNC: 4.9 MMOL/L
PROT SERPL-MCNC: 7.8 G/DL
PROT UR-MCNC: <4 MG/DL
PROTEIN URINE: NEGATIVE MG/DL
RBC # BLD: 5.38 M/UL
RBC # FLD: 14.8 %
RED BLOOD CELLS URINE: 0 /HPF
SODIUM SERPL-SCNC: 140 MMOL/L
SPECIFIC GRAVITY URINE: 1.01
TSH SERPL-ACNC: 50.4 UIU/ML
UROBILINOGEN URINE: 0.2 MG/DL
WBC # FLD AUTO: 9.97 K/UL
WHITE BLOOD CELLS URINE: 0 /HPF

## 2024-12-24 ENCOUNTER — LABORATORY RESULT (OUTPATIENT)
Age: 49
End: 2024-12-24

## 2025-01-23 NOTE — ED PROVIDER NOTE - NSFOLLOWUPINSTRUCTIONS_ED_ALL_ED_FT
You were seen in the emergency department for back pain and abdominal pain.  A copy of your laboratory work was given to you. A copy of your CAT scan was given to you.  A prescription for Flomax, Motrin, and Percocet was sent to your pharmacy. Please take the medicine as prescribed to you.  Please return to the emergency department should you develop worsening symptoms, including but not limited to worsening pain, fever, vomiting, chest pain, difficulty breathing.   Please follow up with your primary care physician within 2-3 days.  Please call [phone] to make an appointment with a urologist.
6

## 2025-01-29 ENCOUNTER — OUTPATIENT (OUTPATIENT)
Dept: OUTPATIENT SERVICES | Facility: HOSPITAL | Age: 50
LOS: 1 days | End: 2025-01-29

## 2025-01-29 ENCOUNTER — APPOINTMENT (OUTPATIENT)
Dept: INTERNAL MEDICINE | Facility: CLINIC | Age: 50
End: 2025-01-29
Payer: MEDICAID

## 2025-01-29 VITALS
DIASTOLIC BLOOD PRESSURE: 85 MMHG | OXYGEN SATURATION: 98 % | BODY MASS INDEX: 25.4 KG/M2 | TEMPERATURE: 98 F | RESPIRATION RATE: 16 BRPM | WEIGHT: 126 LBS | HEIGHT: 59 IN | HEART RATE: 71 BPM | SYSTOLIC BLOOD PRESSURE: 130 MMHG

## 2025-01-29 VITALS — SYSTOLIC BLOOD PRESSURE: 120 MMHG | DIASTOLIC BLOOD PRESSURE: 80 MMHG

## 2025-01-29 DIAGNOSIS — M35.1 OTHER OVERLAP SYNDROMES: ICD-10-CM

## 2025-01-29 DIAGNOSIS — J84.9 INTERSTITIAL PULMONARY DISEASE, UNSPECIFIED: ICD-10-CM

## 2025-01-29 DIAGNOSIS — Z98.890 OTHER SPECIFIED POSTPROCEDURAL STATES: Chronic | ICD-10-CM

## 2025-01-29 DIAGNOSIS — M79.604 PAIN IN RIGHT LEG: ICD-10-CM

## 2025-01-29 DIAGNOSIS — Z23 ENCOUNTER FOR IMMUNIZATION: ICD-10-CM

## 2025-01-29 DIAGNOSIS — Z87.898 PERSONAL HISTORY OF OTHER SPECIFIED CONDITIONS: ICD-10-CM

## 2025-01-29 DIAGNOSIS — Z98.89 OTHER SPECIFIED POSTPROCEDURAL STATES: Chronic | ICD-10-CM

## 2025-01-29 DIAGNOSIS — M79.605 PAIN IN RIGHT LEG: ICD-10-CM

## 2025-01-29 DIAGNOSIS — M34.9 SYSTEMIC SCLEROSIS, UNSPECIFIED: ICD-10-CM

## 2025-01-29 DIAGNOSIS — E03.9 HYPOTHYROIDISM, UNSPECIFIED: ICD-10-CM

## 2025-01-29 LAB
ALBUMIN SERPL ELPH-MCNC: 4.8 G/DL
ALP BLD-CCNC: 102 U/L
ALT SERPL-CCNC: 15 U/L
ANION GAP SERPL CALC-SCNC: 18 MMOL/L
AST SERPL-CCNC: 24 U/L
BASOPHILS # BLD AUTO: 0.04 K/UL
BASOPHILS NFR BLD AUTO: 0.5 %
BILIRUB SERPL-MCNC: 0.6 MG/DL
BUN SERPL-MCNC: 9 MG/DL
CALCIUM SERPL-MCNC: 9.9 MG/DL
CHLORIDE SERPL-SCNC: 102 MMOL/L
CO2 SERPL-SCNC: 22 MMOL/L
CREAT SERPL-MCNC: 0.48 MG/DL
EGFR: 117 ML/MIN/1.73M2
EOSINOPHIL # BLD AUTO: 0.14 K/UL
EOSINOPHIL NFR BLD AUTO: 1.8 %
GLUCOSE SERPL-MCNC: 88 MG/DL
HCT VFR BLD CALC: 45.6 %
HGB BLD-MCNC: 14.6 G/DL
IMM GRANULOCYTES NFR BLD AUTO: 0.4 %
LYMPHOCYTES # BLD AUTO: 3.3 K/UL
LYMPHOCYTES NFR BLD AUTO: 41.6 %
MAN DIFF?: NORMAL
MCHC RBC-ENTMCNC: 26.4 PG
MCHC RBC-ENTMCNC: 32 G/DL
MCV RBC AUTO: 82.5 FL
MONOCYTES # BLD AUTO: 0.61 K/UL
MONOCYTES NFR BLD AUTO: 7.7 %
NEUTROPHILS # BLD AUTO: 3.81 K/UL
NEUTROPHILS NFR BLD AUTO: 48 %
PLATELET # BLD AUTO: 210 K/UL
POTASSIUM SERPL-SCNC: 4.6 MMOL/L
PROT SERPL-MCNC: 7.8 G/DL
RBC # BLD: 5.53 M/UL
RBC # FLD: 14.8 %
SODIUM SERPL-SCNC: 142 MMOL/L
TSH SERPL-ACNC: 0.84 UIU/ML
WBC # FLD AUTO: 7.93 K/UL

## 2025-01-29 PROCEDURE — 99214 OFFICE O/P EST MOD 30 MIN: CPT | Mod: 25

## 2025-01-29 RX ORDER — LEVOTHYROXINE SODIUM 0.05 MG/1
50 TABLET ORAL
Qty: 90 | Refills: 3 | Status: ACTIVE | COMMUNITY
Start: 2025-01-29 | End: 1900-01-01

## 2025-01-29 RX ORDER — BLOOD PRESSURE TEST KIT-LARGE
KIT MISCELLANEOUS
Qty: 1 | Refills: 0 | Status: ACTIVE | COMMUNITY
Start: 2025-01-29 | End: 1900-01-01

## 2025-01-31 DIAGNOSIS — M35.1 OTHER OVERLAP SYNDROMES: ICD-10-CM

## 2025-01-31 DIAGNOSIS — M79.604 PAIN IN RIGHT LEG: ICD-10-CM

## 2025-01-31 DIAGNOSIS — J84.9 INTERSTITIAL PULMONARY DISEASE, UNSPECIFIED: ICD-10-CM

## 2025-01-31 DIAGNOSIS — Z23 ENCOUNTER FOR IMMUNIZATION: ICD-10-CM

## 2025-01-31 DIAGNOSIS — E03.9 HYPOTHYROIDISM, UNSPECIFIED: ICD-10-CM

## 2025-01-31 DIAGNOSIS — M34.9 SYSTEMIC SCLEROSIS, UNSPECIFIED: ICD-10-CM

## 2025-02-03 ENCOUNTER — INPATIENT (INPATIENT)
Facility: HOSPITAL | Age: 50
LOS: 2 days | Discharge: NURSING, REHAB OR LONG-TERM CARE | DRG: 552 | End: 2025-02-06
Attending: HOSPITALIST | Admitting: INTERNAL MEDICINE
Payer: MEDICAID

## 2025-02-03 VITALS
OXYGEN SATURATION: 98 % | DIASTOLIC BLOOD PRESSURE: 74 MMHG | WEIGHT: 125 LBS | SYSTOLIC BLOOD PRESSURE: 123 MMHG | HEART RATE: 78 BPM | TEMPERATURE: 98 F | RESPIRATION RATE: 16 BRPM

## 2025-02-03 DIAGNOSIS — Z98.890 OTHER SPECIFIED POSTPROCEDURAL STATES: Chronic | ICD-10-CM

## 2025-02-03 DIAGNOSIS — S22.000A WEDGE COMPRESSION FRACTURE OF UNSPECIFIED THORACIC VERTEBRA, INITIAL ENCOUNTER FOR CLOSED FRACTURE: ICD-10-CM

## 2025-02-03 DIAGNOSIS — Z98.89 OTHER SPECIFIED POSTPROCEDURAL STATES: Chronic | ICD-10-CM
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
## 2025-02-03 DIAGNOSIS — Z29.9 ENCOUNTER FOR PROPHYLACTIC MEASURES, UNSPECIFIED: ICD-10-CM

## 2025-02-03 DIAGNOSIS — I10 ESSENTIAL (PRIMARY) HYPERTENSION: ICD-10-CM

## 2025-02-03 DIAGNOSIS — M06.9 RHEUMATOID ARTHRITIS, UNSPECIFIED: ICD-10-CM

## 2025-02-03 DIAGNOSIS — E03.9 HYPOTHYROIDISM, UNSPECIFIED: ICD-10-CM

## 2025-02-03 LAB
ANION GAP SERPL CALC-SCNC: 9 MMOL/L — SIGNIFICANT CHANGE UP (ref 5–17)
APTT BLD: 33.4 SEC — SIGNIFICANT CHANGE UP (ref 24.5–35.6)
BUN SERPL-MCNC: 12 MG/DL — SIGNIFICANT CHANGE UP (ref 7–23)
CALCIUM SERPL-MCNC: 9.5 MG/DL — SIGNIFICANT CHANGE UP (ref 8.5–10.1)
CHLORIDE SERPL-SCNC: 106 MMOL/L — SIGNIFICANT CHANGE UP (ref 96–108)
CO2 SERPL-SCNC: 25 MMOL/L — SIGNIFICANT CHANGE UP (ref 22–31)
CREAT SERPL-MCNC: 0.53 MG/DL — SIGNIFICANT CHANGE UP (ref 0.5–1.3)
EGFR: 114 ML/MIN/1.73M2 — SIGNIFICANT CHANGE UP
GLUCOSE SERPL-MCNC: 98 MG/DL — SIGNIFICANT CHANGE UP (ref 70–99)
HCT VFR BLD CALC: 40.9 % — SIGNIFICANT CHANGE UP (ref 34.5–45)
HGB BLD-MCNC: 13.4 G/DL — SIGNIFICANT CHANGE UP (ref 11.5–15.5)
INR BLD: 1.13 RATIO — SIGNIFICANT CHANGE UP (ref 0.85–1.16)
MCHC RBC-ENTMCNC: 26.4 PG — LOW (ref 27–34)
MCHC RBC-ENTMCNC: 32.8 G/DL — SIGNIFICANT CHANGE UP (ref 32–36)
MCV RBC AUTO: 80.7 FL — SIGNIFICANT CHANGE UP (ref 80–100)
NRBC # BLD: 0 /100 WBCS — SIGNIFICANT CHANGE UP (ref 0–0)
NRBC BLD-RTO: 0 /100 WBCS — SIGNIFICANT CHANGE UP (ref 0–0)
PLATELET # BLD AUTO: 199 K/UL — SIGNIFICANT CHANGE UP (ref 150–400)
POTASSIUM SERPL-MCNC: 3.8 MMOL/L — SIGNIFICANT CHANGE UP (ref 3.5–5.3)
POTASSIUM SERPL-SCNC: 3.8 MMOL/L — SIGNIFICANT CHANGE UP (ref 3.5–5.3)
PROTHROM AB SERPL-ACNC: 13.3 SEC — SIGNIFICANT CHANGE UP (ref 9.9–13.4)
RBC # BLD: 5.07 M/UL — SIGNIFICANT CHANGE UP (ref 3.8–5.2)
RBC # FLD: 14.6 % — HIGH (ref 10.3–14.5)
SODIUM SERPL-SCNC: 140 MMOL/L — SIGNIFICANT CHANGE UP (ref 135–145)
WBC # BLD: 14.36 K/UL — HIGH (ref 3.8–10.5)
WBC # FLD AUTO: 14.36 K/UL — HIGH (ref 3.8–10.5)

## 2025-02-03 PROCEDURE — 93010 ELECTROCARDIOGRAM REPORT: CPT

## 2025-02-03 PROCEDURE — 74176 CT ABD & PELVIS W/O CONTRAST: CPT | Mod: 26

## 2025-02-03 PROCEDURE — 72131 CT LUMBAR SPINE W/O DYE: CPT | Mod: 26

## 2025-02-03 PROCEDURE — 72148 MRI LUMBAR SPINE W/O DYE: CPT | Mod: 26

## 2025-02-03 PROCEDURE — 71045 X-RAY EXAM CHEST 1 VIEW: CPT | Mod: 26

## 2025-02-03 PROCEDURE — 99285 EMERGENCY DEPT VISIT HI MDM: CPT

## 2025-02-03 PROCEDURE — 99222 1ST HOSP IP/OBS MODERATE 55: CPT | Mod: GC

## 2025-02-03 RX ORDER — DULOXETINE 20 MG/1
1 CAPSULE, DELAYED RELEASE ORAL
Refills: 0 | DISCHARGE

## 2025-02-03 RX ORDER — NIFEDIPINE 90 MG/1
2 TABLET, FILM COATED, EXTENDED RELEASE ORAL
Refills: 0 | DISCHARGE

## 2025-02-03 RX ORDER — DULOXETINE 20 MG/1
30 CAPSULE, DELAYED RELEASE ORAL DAILY
Refills: 0 | Status: DISCONTINUED | OUTPATIENT
Start: 2025-02-03 | End: 2025-02-06

## 2025-02-03 RX ORDER — HYDROXYCHLOROQUINE SULFATE 200 MG/1
200 TABLET, FILM COATED ORAL DAILY
Refills: 0 | Status: DISCONTINUED | OUTPATIENT
Start: 2025-02-03 | End: 2025-02-06

## 2025-02-03 RX ORDER — MYCOPHENOLATE MOFETIL 200 MG/ML
500 POWDER, FOR SUSPENSION ORAL
Refills: 0 | Status: DISCONTINUED | OUTPATIENT
Start: 2025-02-03 | End: 2025-02-06

## 2025-02-03 RX ORDER — ACETAMINOPHEN 160 MG/5ML
1000 SUSPENSION ORAL ONCE
Refills: 0 | Status: COMPLETED | OUTPATIENT
Start: 2025-02-03 | End: 2025-02-03

## 2025-02-03 RX ORDER — LEVOTHYROXINE SODIUM 25 UG/1
1 TABLET ORAL
Refills: 0 | DISCHARGE

## 2025-02-03 RX ORDER — HYDROXYCHLOROQUINE SULFATE 200 MG/1
1 TABLET, FILM COATED ORAL
Refills: 0 | DISCHARGE

## 2025-02-03 RX ORDER — HYDROMORPHONE HYDROCHLORIDE 4 MG/ML
0.5 INJECTION, SOLUTION INTRAMUSCULAR; INTRAVENOUS; SUBCUTANEOUS EVERY 6 HOURS
Refills: 0 | Status: DISCONTINUED | OUTPATIENT
Start: 2025-02-03 | End: 2025-02-06

## 2025-02-03 RX ORDER — TRAMADOL HYDROCHLORIDE 100 MG/1
50 TABLET, EXTENDED RELEASE ORAL EVERY 6 HOURS
Refills: 0 | Status: DISCONTINUED | OUTPATIENT
Start: 2025-02-03 | End: 2025-02-06

## 2025-02-03 RX ORDER — ACETAMINOPHEN 160 MG/5ML
1000 SUSPENSION ORAL ONCE
Refills: 0 | Status: DISCONTINUED | OUTPATIENT
Start: 2025-02-03 | End: 2025-02-06

## 2025-02-03 RX ORDER — OXYCODONE HYDROCHLORIDE AND ACETAMINOPHEN 5; 325 MG/1; MG/1
1 TABLET ORAL ONCE
Refills: 0 | Status: DISCONTINUED | OUTPATIENT
Start: 2025-02-03 | End: 2025-02-03

## 2025-02-03 RX ORDER — IBANDRONATE SODIUM 150 MG/1
1 TABLET ORAL
Refills: 0 | DISCHARGE

## 2025-02-03 RX ORDER — NIFEDIPINE 90 MG/1
30 TABLET, FILM COATED, EXTENDED RELEASE ORAL DAILY
Refills: 0 | Status: DISCONTINUED | OUTPATIENT
Start: 2025-02-03 | End: 2025-02-06

## 2025-02-03 RX ORDER — MAGNESIUM, ALUMINUM HYDROXIDE 200-225/5
30 SUSPENSION, ORAL (FINAL DOSE FORM) ORAL EVERY 4 HOURS
Refills: 0 | Status: DISCONTINUED | OUTPATIENT
Start: 2025-02-03 | End: 2025-02-06

## 2025-02-03 RX ORDER — ACETAMINOPHEN, DIPHENHYDRAMINE HCL, PHENYLEPHRINE HCL 325; 25; 5 MG/1; MG/1; MG/1
3 TABLET ORAL AT BEDTIME
Refills: 0 | Status: DISCONTINUED | OUTPATIENT
Start: 2025-02-03 | End: 2025-02-06

## 2025-02-03 RX ORDER — ACETAMINOPHEN 160 MG/5ML
650 SUSPENSION ORAL EVERY 6 HOURS
Refills: 0 | Status: DISCONTINUED | OUTPATIENT
Start: 2025-02-04 | End: 2025-02-06

## 2025-02-03 RX ORDER — MORPHINE SULFATE 60 MG/1
4 TABLET, FILM COATED, EXTENDED RELEASE ORAL ONCE
Refills: 0 | Status: DISCONTINUED | OUTPATIENT
Start: 2025-02-03 | End: 2025-02-03

## 2025-02-03 RX ORDER — ENOXAPARIN SODIUM 100 MG/ML
40 INJECTION SUBCUTANEOUS EVERY 24 HOURS
Refills: 0 | Status: DISCONTINUED | OUTPATIENT
Start: 2025-02-03 | End: 2025-02-06

## 2025-02-03 RX ORDER — LEVOTHYROXINE SODIUM 25 UG/1
50 TABLET ORAL DAILY
Refills: 0 | Status: DISCONTINUED | OUTPATIENT
Start: 2025-02-03 | End: 2025-02-06

## 2025-02-03 RX ADMIN — Medication 30 MILLILITER(S): at 21:48

## 2025-02-03 RX ADMIN — MORPHINE SULFATE 4 MILLIGRAM(S): 60 TABLET, FILM COATED, EXTENDED RELEASE ORAL at 16:15

## 2025-02-03 RX ADMIN — ACETAMINOPHEN 1000 MILLIGRAM(S): 160 SUSPENSION ORAL at 22:50

## 2025-02-03 RX ADMIN — OXYCODONE HYDROCHLORIDE AND ACETAMINOPHEN 1 TABLET(S): 5; 325 TABLET ORAL at 10:11

## 2025-02-03 RX ADMIN — ACETAMINOPHEN 400 MILLIGRAM(S): 160 SUSPENSION ORAL at 21:49

## 2025-02-03 NOTE — H&P ADULT - PROBLEM SELECTOR PLAN 4
#scleroderma  -chronic  -on nifedipine 30 2tabs daily however pt states she is only taking one tab daily   -c/w nifedipine 30mg daily w/ hold parameters   -monitor hemodynamics

## 2025-02-03 NOTE — CONSULT NOTE ADULT - SUBJECTIVE AND OBJECTIVE BOX
Patient is a 49y Female who presents c/o LBP sp fall on ice. Denies HS/LOC. Denies pain/injury elsewhere. Denies numbness/tingling/paresthesias/weakness. Denies bowel/bladder incontinence. Denies fevers/chills. No other complaints at this time.     HEALTH ISSUES - PROBLEM Dx:          MEDICATIONS  (STANDING):      Allergies    No Known Allergies    Intolerances        PAST MEDICAL & SURGICAL HISTORY:                          Vital Signs Last 24 Hrs  T(C): 36.7 (02-03-25 @ 09:50), Max: 36.7 (02-03-25 @ 09:50)  T(F): 98 (02-03-25 @ 09:50), Max: 98 (02-03-25 @ 09:50)  HR: 78 (02-03-25 @ 09:50) (78 - 78)  BP: 123/74 (02-03-25 @ 09:50) (123/74 - 123/74)  BP(mean): --  RR: 16 (02-03-25 @ 09:50) (16 - 16)  SpO2: 98% (02-03-25 @ 09:50) (98% - 98%)    Gen: NAD    Spine PE:  Skin intact  No gross deformity  No midnline TTP C/T/L/S spine  No bony step offs  No paraspinal muscle ttp/hypertonicity   Negative clonus  Negative babinski  Negative bellamy  No saddle anesthesia             Deltoid        Bicep        Tricep          Wrist Ext    Wrist Flex    Finger Flex          Finger Abd  R            5/5          5/5           5/5              5/5                5/5	           5/5                         5/5  L             5/5           5/5          5/5              5/5                5/5                   5/5                        5/5                Hip Flex       Quad     Ankle DF        Ankle PF       Toe Ext        Hamstring    R            5/5              5/5          5/5                 5/5                 5/5	                5/5  L            5/5              4/5           5/5                 5/5                 5/5                5/5    Sensory:            C5         C6         C7      C8       T1        (0=absent, 1=impaired, 2=normal, NT=not testable)  R         2            2           2        2         2  L          2            2           2        2         2               L2          L3         L4      L5       S1         (0=absent, 1=impaired, 2=normal, NT=not testable)  R         2            2            2        2        2  L          2            2           2        2         2      Imaging: Acute compression fracture involving the superior aspect of T12 with retropulsed fragment identified. This finding can be further evaluated with MRI of the lumbar spine, if there are no contraindications.    A/P: 49y Female s/p fall on ice with T12 fracture  -Pain control  - MRI ordered, pending results  - TLSO brace   - no acute orthopedic surgical intervention needed at this time, please reconsult if needed  - f/u up in Dr Saini office in 1-2 weeks, call to make a appointment  - D/W Dr Saini who agrees with above plan       Patient is a 49y Female who presents c/o LBP sp fall on ice. Denies HS/LOC. Denies pain/injury elsewhere. Denies numbness/tingling/paresthesias/weakness. Denies bowel/bladder incontinence. Denies fevers/chills. No other complaints at this time.     HEALTH ISSUES - PROBLEM Dx:          MEDICATIONS  (STANDING):      Allergies    No Known Allergies    Intolerances      PAST MEDICAL & SURGICAL HISTORY:      Vital Signs Last 24 Hrs  T(C): 36.7 (02-03-25 @ 09:50), Max: 36.7 (02-03-25 @ 09:50)  T(F): 98 (02-03-25 @ 09:50), Max: 98 (02-03-25 @ 09:50)  HR: 78 (02-03-25 @ 09:50) (78 - 78)  BP: 123/74 (02-03-25 @ 09:50) (123/74 - 123/74)  BP(mean): --  RR: 16 (02-03-25 @ 09:50) (16 - 16)  SpO2: 98% (02-03-25 @ 09:50) (98% - 98%)    Gen: NAD    Spine PE:  Skin intact  No gross deformity  No midline TTP C/T/L/S spine  No bony step offs  No paraspinal muscle ttp/hypertonicity   Negative clonus  Negative babinski  Negative bellamy  No saddle anesthesia             Deltoid        Bicep        Tricep          Wrist Ext    Wrist Flex    Finger Flex          Finger Abd  R            5/5          5/5           5/5              5/5                5/5	           5/5                         5/5  L             5/5           5/5          5/5              5/5                5/5                   5/5                        5/5                Hip Flex       Quad     Ankle DF        Ankle PF       Toe Ext        Hamstring    R            5/5              5/5          5/5                 5/5                 5/5	                5/5  L            5/5              5/5           5/5                 5/5                 5/5                5/5    Sensory:            C5         C6         C7      C8       T1        (0=absent, 1=impaired, 2=normal, NT=not testable)  R         2            2           2        2         2  L          2            2           2        2         2               L2          L3         L4      L5       S1         (0=absent, 1=impaired, 2=normal, NT=not testable)  R         2            2            2        2        2  L          2            2           2        2         2    Secondary Assessment:  NC/AT, NTTP of clavicles, NTTP of Pelvis  RUE: NTTP of Shoulder, Elbow, Wrist, Hand; NT with AROM/PROM of Shoulder, Elbow, Wrist, Hand; AIN/PIN/Med/Uln/Msc/Rad/Ax intact  LUE: NTTP of Shoulder, Elbow, Wrist, Hand; NT with AROM/PROM of Shoulder, Elbow, Wrist, Hand; AIN/PIN/Med/Uln/Msc/Rad/Ax intact  RLE: Able to SLR, NT with Log Roll, NT with Heel Strike, NTTP of Hip, Knee, Ankle, Foot; NT with AROM/PROM of Hip, Knee, Ankle, Foot; Q/H/Gsc/TA/EHL/FHL intact  LLE: Able to SLR, NT with Log Roll, NT with Heel Strike, NTTP of Hip, Knee, Ankle, Foot; NT with AROM/PROM of Hip, Knee, Ankle, Foot; Q/H/Gsc/TA/EHL/FHL intact      Imaging: Acute compression fracture involving the superior aspect of T12 with retropulsed fragment identified. This finding can be further evaluated with MRI of the lumbar spine, if there are no contraindications.    A/P: 49y Female s/p fall on ice with T12 fracture with bony retropulsion    - MRI Lumbar spine URGENT (Ordered)  - Pain control - multimodal pain control with oxycodone, Tylenol  - Muscle relaxant  - MRI ordered, pending results  - NWB, strict bedrest    - D/W Dr. Saini who agrees with above plan       Patient is a 49y Female who presents c/o LBP sp fall on ice. Denies HS/LOC. Denies pain/injury elsewhere. Denies numbness/tingling/paresthesias/weakness. Denies bowel/bladder incontinence. Denies fevers/chills. No other complaints at this time.     HEALTH ISSUES - PROBLEM Dx:          MEDICATIONS  (STANDING):      Allergies    No Known Allergies    Intolerances      PAST MEDICAL & SURGICAL HISTORY:      Vital Signs Last 24 Hrs  T(C): 36.7 (02-03-25 @ 09:50), Max: 36.7 (02-03-25 @ 09:50)  T(F): 98 (02-03-25 @ 09:50), Max: 98 (02-03-25 @ 09:50)  HR: 78 (02-03-25 @ 09:50) (78 - 78)  BP: 123/74 (02-03-25 @ 09:50) (123/74 - 123/74)  BP(mean): --  RR: 16 (02-03-25 @ 09:50) (16 - 16)  SpO2: 98% (02-03-25 @ 09:50) (98% - 98%)    Gen: NAD    Spine PE:  Skin intact  No gross deformity  No midline TTP C/T/L/S spine  No bony step offs  No paraspinal muscle ttp/hypertonicity   Negative clonus  Negative babinski  Negative bellamy  No saddle anesthesia             Deltoid        Bicep        Tricep          Wrist Ext    Wrist Flex    Finger Flex          Finger Abd  R            5/5          5/5           5/5              5/5                5/5	           5/5                         5/5  L             5/5           5/5          5/5              5/5                5/5                   5/5                        5/5                Hip Flex       Quad     Ankle DF        Ankle PF       Toe Ext        Hamstring    R            5/5              5/5          5/5                 5/5                 5/5	                5/5  L            5/5              5/5           5/5                 5/5                 5/5                5/5    Sensory:            C5         C6         C7      C8       T1        (0=absent, 1=impaired, 2=normal, NT=not testable)  R         2            2           2        2         2  L          2            2           2        2         2               L2          L3         L4      L5       S1         (0=absent, 1=impaired, 2=normal, NT=not testable)  R         2            2            2        2        2  L          2            2           2        2         2    Secondary Assessment:  NC/AT, NTTP of clavicles, NTTP of Pelvis  RUE: NTTP of Shoulder, Elbow, Wrist, Hand; NT with AROM/PROM of Shoulder, Elbow, Wrist, Hand; AIN/PIN/Med/Uln/Msc/Rad/Ax intact  LUE: NTTP of Shoulder, Elbow, Wrist, Hand; NT with AROM/PROM of Shoulder, Elbow, Wrist, Hand; AIN/PIN/Med/Uln/Msc/Rad/Ax intact  RLE: Able to SLR, NT with Log Roll, NT with Heel Strike, NTTP of Hip, Knee, Ankle, Foot; NT with AROM/PROM of Hip, Knee, Ankle, Foot; Q/H/Gsc/TA/EHL/FHL intact  LLE: Able to SLR, NT with Log Roll, NT with Heel Strike, NTTP of Hip, Knee, Ankle, Foot; NT with AROM/PROM of Hip, Knee, Ankle, Foot; Q/H/Gsc/TA/EHL/FHL intact      Imaging: Acute compression fracture involving the superior aspect of T12 with retropulsed fragment identified. This finding can be further evaluated with MRI of the lumbar spine, if there are no contraindications.    A/P: 49y Female s/p fall on ice with T12 fracture with bony retropulsion    - MRI Lumbar spine URGENT (Ordered)  - Pain control - multimodal pain control with oxycodone, Tylenol  - Muscle relaxant  - MRI ordered, pending results  - NWB, strict bedrest    - D/W Dr. Saini who agrees with above plan     Addendum:  MRI Lumbar Spine- Acute compression fracture of the superior T12 vertebral body. There is mild bony retropulsion which indents the ventral thecal sac without cord compression.  -TLSO brace  -Pain control  -no acute orthopedic surgical intervention at this time  -outpatient follow up with Dr. Saini  -D/w Dr. saini who agrees with above plan.

## 2025-02-03 NOTE — ED ADULT NURSE NOTE - OBJECTIVE STATEMENT
49y F BIBEMS from home s/p trip & fall... pt  a&ox3, clear speech, c/o severe pain to mid- lower back after slipping backwards and hitting back on brick patio..   denies LOC/dizziness or head trauma. denies blood thinners. no obvious lacerations... EMS administered 50 fentanyl on route, pt reports persistent pain, Dr Bennett evaluated, pain Rx administered per orders

## 2025-02-03 NOTE — ED PROVIDER NOTE - OBJECTIVE STATEMENT
49-year-old female with a history of rheumatoid arthritis, scleroderma, hypothyroidism presents with status post fall on the ice today.  The patient slipped on 2 steps, landing on her lower back.  Patient did not hit her head.  No loss of consciousness.  Patient denies headache.  No neck pain.  The patient complains of lower back pain.  No abdominal pain.  No numbness or tingling the arms or legs.  No focal weakness.  No radiation of pain to the arms or legs.  No aggravating or alleviating factors otherwise noted.  No other acute injury or complaints.  EMS was called, the patient was given fentanyl 50 mcg prior to arrival.  Some equipment in the pain, but the patient states the pain is coming back.  No aggravating or alleviating factors otherwise noted.  No other acute injury or complaints.

## 2025-02-03 NOTE — PATIENT PROFILE ADULT - FALL HARM RISK - HARM RISK INTERVENTIONS
Assistance with ambulation/Assistance OOB with selected safe patient handling equipment/Communicate Risk of Fall with Harm to all staff/Discuss with provider need for PT consult/Monitor gait and stability/Reinforce activity limits and safety measures with patient and family/Tailored Fall Risk Interventions/Visual Cue: Yellow wristband and red socks/Bed in lowest position, wheels locked, appropriate side rails in place/Call bell, personal items and telephone in reach/Instruct patient to call for assistance before getting out of bed or chair/Non-slip footwear when patient is out of bed/[place] to call system/Physically safe environment - no spills, clutter or unnecessary equipment/Purposeful Proactive Rounding/Room/bathroom lighting operational, light cord in reach

## 2025-02-03 NOTE — H&P ADULT - NSHPPHYSICALEXAM_GEN_ALL_CORE
Physical Exam  General: awake, no apparent distress, moist mucous membranes  Neck: Supple, no lymphadenopathy  Cardiac: regular rate, no murmur  Respiratory: CTAB, no accessory muscle use, retractions, or nasal flaring  Abdomen: Soft, nontender not distended, no HSM,  bowel sounds present  Extremities: decreased ROM of R leg compared to Left, no edema  Skin: No rash. Warm and well perfused, cap refill<2 seconds  Neurologic: alert, oriented, CN intact, motor and sensation grossly intact

## 2025-02-03 NOTE — H&P ADULT - NSHPSOCIALHISTORY_GEN_ALL_CORE
Tobacco: denies  EtOH:  denies  Recreational drug use: denies  Lives with:    Ambulates: w/o assistance   ADLs: independent

## 2025-02-03 NOTE — CARE COORDINATION ASSESSMENT. - NSCAREPROVIDERS_GEN_ALL_CORE_FT
CARE PROVIDERS:  Accepting Physician: Madi Ayala  Access Services: Annelise Fortuen  Administration: Deven Reid  Administration: Bogdan Walden  Administration: Jesenia Oakes  Admitting: Madi Ayala  Attending: Madi Ayala  Case Management: Pastora Weller  Consultant: Edilberto Pacheco  Consultant: Kandis Burton  Consultant: Regulo Camacho  Consultant: Anitha Pierce  Consultant: Nancy Marshall  ED Attending: Ildefonso Bennett  ED Nurse: Ankit Roberts  Nurse: Rita Kellogg  Ordered[facility] [place]  Physical Therapy: [facility]  Primary Team: Gisela Hill  Primary Team: Christiane Burks  : Rylie Braun  Student: Sukhdev Barreto  UR// Supp. Assoc.: Ryder Awad  UR// Supp. Assoc.: Marina Munoz

## 2025-02-03 NOTE — H&P ADULT - PROBLEM SELECTOR PLAN 3
chronic   -on home duloxetine 30mg daily, hydroxychloroquine 200 daily, mycophenolate mofetil 500mg BID, and ibandronate sodium 150 once monthly   -c/w home meds

## 2025-02-03 NOTE — ED PROVIDER NOTE - CLINICAL SUMMARY MEDICAL DECISION MAKING FREE TEXT BOX
Acute back pain status post fall on the stairs.  No anticoagulant use.  No significant head trauma or neck pain.  Will check CT of the lumbar spine with abdomen pelvis, pain control, reeval
Endometrial cancer

## 2025-02-03 NOTE — H&P ADULT - PROBLEM SELECTOR PLAN 1
pt presenting s/p fall on ice found to have acute T12 compression fracture   -no loss of consciousness no head trauma   -CT spine :Acute compression fracture involving the superior aspect of T12 with retropulsed fragment identified. This finding can be further evaluated with MRI of the lumbar spine, if there are no contraindications.  -MR lumbar spine: Acute compression fracture of the superior T12 vertebral body. There is mild bony retropulsion which indents the ventral thecal sac without cord compression  -ortho consulted - recs TLSO brace, Pain control, no acute orthopedic surgical intervention at this time  -PT consulted- unable to stand will need to reassess tomorrow for rehab vs home PT   -pain control tramadol q6 and dilaudid 0.5 prn for severe pain

## 2025-02-03 NOTE — ED PROVIDER NOTE - PROGRESS NOTE DETAILS
Patient seen by orthopedics, they will check MRI now.  No other acute change at this time. Discussed with orthopedics.  The patient is cleared from their standpoint, nonoperative treatment.  They are arranging the patient to get a back brace to wear.  The patient can be discharged home as tolerated.  Will have physical therapy see the patient. Patient seen by physical therapy, was unable to ambulate, the patient will need admission.  They will reevaluate the patient tomorrow, and will consider rehab if the patient still having difficulty. Discussed with Dr. Ayala, will see patient to admit.

## 2025-02-03 NOTE — ED PROVIDER NOTE - PHYSICAL EXAMINATION
Patient with mild tenderness to the diffuse lumbar.  No step-offs or focal tenderness.  Normal nonfocal detailed neurologic exam. Patient with mild tenderness to the diffuse lumbar.  No step-offs or focal tenderness.  Normal nonfocal detailed neurologic exam. nl reflexes, str/ sens equal bl

## 2025-02-03 NOTE — ED ADULT NURSE NOTE - NSFALLHARMRISKINTERV_ED_ALL_ED
Assistance OOB with selected safe patient handling equipment if applicable/Communicate risk of Fall with Harm to all staff, patient, and family/Provide patient with walking aids/Provide visual cue: red socks, yellow wristband, yellow gown, etc/Reinforce activity limits and safety measures with patient and family/Bed in lowest position, wheels locked, appropriate side rails in place/Call bell, personal items and telephone in reach/Instruct patient to call for assistance before getting out of bed/chair/stretcher/Non-slip footwear applied when patient is off stretcher/[place] to call system/Physically safe environment - no spills, clutter or unnecessary equipment/Purposeful Proactive Rounding/Room/bathroom lighting operational, light cord in reach

## 2025-02-03 NOTE — ED PROVIDER NOTE - CARE PLAN
1 Principal Discharge DX:	Compression fracture of thoracic vertebra   Principal Discharge DX:	Compression fracture of thoracic vertebra  Secondary Diagnosis:	Unable to ambulate

## 2025-02-03 NOTE — PHYSICAL THERAPY INITIAL EVALUATION ADULT - ADDITIONAL COMMENTS
Patient lives in private home, +LIA and then has one flight to access second floor. Patient was independent in all ADLs and ambulated independently without device.

## 2025-02-03 NOTE — H&P ADULT - HISTORY OF PRESENT ILLNESS
49-year-old female with PMHx of rheumatoid arthritis, scleroderma, hypothyroidism presents with status post fall on the ice today.  Pt states she fell this morning while going to her car after slipping on ice down the steps. She states she fell on her lower back. She was unable to get up at the time and ambulance was called.  She states she did not hit her head.  No loss of consciousness. the patient was given fentanyl 50 mcg prior to arrival.  She states the pain right now is mainly in her lower back and a little on her tailbone. She rates her pain 4/10. She states she has a little bit of a headache but from the way she is laying. She denies any dizziness or lightheadedness prior to the fall. She has not had any falls in the last 6 months. She has chronic pain in most of her joints due to her arthritis. No aggravating or alleviating factors otherwise noted.  No other acute injury or complaints.  No neck pain.  The patient complains of lower back pain.  No abdominal pain.  No numbness or tingling the arms or legs.  No focal weakness.  No radiation of pain to the arms or legs.     ED course  Vitals: T 98 , HR 78, /74 , RR 16 , SpO2 98 % on RA  Significant labs: WBC 14.36  Imaging: CT abd/pelvis: T12 vertebral body compression fracture with mild retropulsion indenting upon the spinal cord.   CT spine :Acute compression fracture involving the superior aspect of T12 with retropulsed fragment identified. This finding can be further evaluated with MRI of the lumbar spine, if there are no contraindications.  MR lumbar spine: Acute compression fracture of the superior T12 vertebral body. There is mild bony retropulsion which indents the ventral thecal sac without cord   compression  EKG: NSR  In ED patient given: percocet x1

## 2025-02-03 NOTE — CHART NOTE - NSCHARTNOTEFT_GEN_A_CORE
Pt seen at bedside in ER to be measured and fitted with TLSO to be used OOB only.  PT present attempting to have patient stand with walker , but she felt dizzy and had to lie down, assisted by therapist.  ER MD recommending that patient be admitted for pain management and safety status.  Patient measured supine and brace adjusted to her measurements.  She will begin wearing the tlso tomorrow with PT.  Follow up if needed      EdilbertoEverplaces  [phone]  [employer]

## 2025-02-03 NOTE — PATIENT PROFILE ADULT - NSPROPTRIGHTSUPPORTPERSON_GEN_A_NUR
63 yes

## 2025-02-03 NOTE — CARE COORDINATION ASSESSMENT. - OTHER PERTINENT DISCHARGE PLANNING INFORMATION:
met with patient and her spouse / caregiver at bedside to introduce self. Role of case management and transition explained. Patient and caregiver verbalized understanding. Patient is from home with fracture of T12 due to fall. Patient was indepenendent prior to Fall. Choice and transition resources explained and given with. Patient and family/caregiver understand that  will remain available.   met with patient and her spouse / caregiver at bedside to introduce self. Role of case management and transition explained. Patient and caregiver verbalized understanding. Patient is from home with spouse and children. Patient admitted for fracture of T12 due to fall. Patient was independent prior to Fall. Choice and transition resources explained and given with. Patient and family/caregiver understand that  will remain available.

## 2025-02-03 NOTE — H&P ADULT - NSICDXPASTSURGICALHX_GEN_ALL_CORE_FT
PAST SURGICAL HISTORY:  H/O right breast biopsy benign 2013    H/O:      S/P lymph node biopsy Right axilla 2016

## 2025-02-03 NOTE — H&P ADULT - ASSESSMENT
49-year-old female with PMHx of rheumatoid arthritis, scleroderma, hypothyroidism presents with status post fall on the ice today. Admitted for T12 compression fracture and difficulty w/ ambulation.

## 2025-02-03 NOTE — PHYSICAL THERAPY INITIAL EVALUATION ADULT - PERTINENT HX OF CURRENT PROBLEM, REHAB EVAL
49-year-old female with a history of rheumatoid arthritis, scleroderma, hypothyroidism presents with status post fall on the ice today.  The patient slipped on 2 steps, landing on her lower back.  Patient did not hit her head.  No loss of consciousness.  Patient denies headache.  No neck pain.  The patient complains of lower back pain.  No abdominal pain.  No numbness or tingling the arms or legs.  No focal weakness.  No radiation of pain to the arms or legs.  No aggravating or alleviating factors otherwise noted.  No other acute injury or complaints.  EMS was called, the patient was given fentanyl 50 mcg prior to arrival.  Some equipment in the pain, but the patient states the pain is coming back.  No aggravating or alleviating factors otherwise noted.  No other acute injury or complaints

## 2025-02-03 NOTE — H&P ADULT - ATTENDING COMMENTS
49-year-old female with PMHx of rheumatoid arthritis, scleroderma, hypothyroidism presents with status post fall on the ice today. Admitted for T12 compression fracture and difficulty w/ ambulation.     PT eval, pain control with Iv dilaudid prn, tramadol, tylenol prn. Ortho consulted, s/p brace, cont to f/u recs.    Madi Ayala, Attending Physician

## 2025-02-03 NOTE — PATIENT CHOICE NOTE. - NSPTCHOICESTATE_GEN_ALL_CORE

## 2025-02-03 NOTE — CARE COORDINATION ASSESSMENT. - NSPASTMEDSURGHISTORY_GEN_ALL_CORE_FT
PAST MEDICAL & SURGICAL HISTORY:  Hypothyroidism      Rheumatoid arteritis      Scleroderma      H/O:   2006      S/P lymph node biopsy  Right axilla       H/O right breast biopsy  benign 2013

## 2025-02-03 NOTE — H&P ADULT - NSICDXFAMILYHX_GEN_ALL_CORE_FT
FAMILY HISTORY:  Father  Still living? Yes, Estimated age: 71-80  Family history of diabetes mellitus in father, Age at diagnosis: Age Unknown    Mother  Still living? No  Family history of heart attack, Age at diagnosis: Age Unknown

## 2025-02-04 ENCOUNTER — TRANSCRIPTION ENCOUNTER (OUTPATIENT)
Age: 50
End: 2025-02-04

## 2025-02-04 DIAGNOSIS — S22.000A WEDGE COMPRESSION FRACTURE OF UNSPECIFIED THORACIC VERTEBRA, INITIAL ENCOUNTER FOR CLOSED FRACTURE: ICD-10-CM

## 2025-02-04 LAB
ALBUMIN SERPL ELPH-MCNC: 3.6 G/DL — SIGNIFICANT CHANGE UP (ref 3.3–5)
ALP SERPL-CCNC: 82 U/L — SIGNIFICANT CHANGE UP (ref 40–120)
ALT FLD-CCNC: 16 U/L — SIGNIFICANT CHANGE UP (ref 12–78)
ANION GAP SERPL CALC-SCNC: 5 MMOL/L — SIGNIFICANT CHANGE UP (ref 5–17)
AST SERPL-CCNC: 16 U/L — SIGNIFICANT CHANGE UP (ref 15–37)
BASOPHILS # BLD AUTO: 0.03 K/UL — SIGNIFICANT CHANGE UP (ref 0–0.2)
BASOPHILS NFR BLD AUTO: 0.3 % — SIGNIFICANT CHANGE UP (ref 0–2)
BILIRUB SERPL-MCNC: 0.7 MG/DL — SIGNIFICANT CHANGE UP (ref 0.2–1.2)
BUN SERPL-MCNC: 12 MG/DL — SIGNIFICANT CHANGE UP (ref 7–23)
CALCIUM SERPL-MCNC: 9.1 MG/DL — SIGNIFICANT CHANGE UP (ref 8.5–10.1)
CHLORIDE SERPL-SCNC: 109 MMOL/L — HIGH (ref 96–108)
CO2 SERPL-SCNC: 25 MMOL/L — SIGNIFICANT CHANGE UP (ref 22–31)
CREAT SERPL-MCNC: 0.49 MG/DL — LOW (ref 0.5–1.3)
EGFR: 116 ML/MIN/1.73M2 — SIGNIFICANT CHANGE UP
EOSINOPHIL # BLD AUTO: 0.11 K/UL — SIGNIFICANT CHANGE UP (ref 0–0.5)
EOSINOPHIL NFR BLD AUTO: 1.2 % — SIGNIFICANT CHANGE UP (ref 0–6)
GLUCOSE SERPL-MCNC: 105 MG/DL — HIGH (ref 70–99)
HCT VFR BLD CALC: 38.5 % — SIGNIFICANT CHANGE UP (ref 34.5–45)
HGB BLD-MCNC: 12.4 G/DL — SIGNIFICANT CHANGE UP (ref 11.5–15.5)
IMM GRANULOCYTES NFR BLD AUTO: 0.3 % — SIGNIFICANT CHANGE UP (ref 0–0.9)
LYMPHOCYTES # BLD AUTO: 2.41 K/UL — SIGNIFICANT CHANGE UP (ref 1–3.3)
LYMPHOCYTES # BLD AUTO: 25.8 % — SIGNIFICANT CHANGE UP (ref 13–44)
MCHC RBC-ENTMCNC: 25.9 PG — LOW (ref 27–34)
MCHC RBC-ENTMCNC: 32.2 G/DL — SIGNIFICANT CHANGE UP (ref 32–36)
MCV RBC AUTO: 80.4 FL — SIGNIFICANT CHANGE UP (ref 80–100)
MONOCYTES # BLD AUTO: 0.79 K/UL — SIGNIFICANT CHANGE UP (ref 0–0.9)
MONOCYTES NFR BLD AUTO: 8.5 % — SIGNIFICANT CHANGE UP (ref 2–14)
NEUTROPHILS # BLD AUTO: 5.97 K/UL — SIGNIFICANT CHANGE UP (ref 1.8–7.4)
NEUTROPHILS NFR BLD AUTO: 63.9 % — SIGNIFICANT CHANGE UP (ref 43–77)
NRBC # BLD: 0 /100 WBCS — SIGNIFICANT CHANGE UP (ref 0–0)
NRBC BLD-RTO: 0 /100 WBCS — SIGNIFICANT CHANGE UP (ref 0–0)
PLATELET # BLD AUTO: 177 K/UL — SIGNIFICANT CHANGE UP (ref 150–400)
POTASSIUM SERPL-MCNC: 4.2 MMOL/L — SIGNIFICANT CHANGE UP (ref 3.5–5.3)
POTASSIUM SERPL-SCNC: 4.2 MMOL/L — SIGNIFICANT CHANGE UP (ref 3.5–5.3)
PROT SERPL-MCNC: 6.9 G/DL — SIGNIFICANT CHANGE UP (ref 6–8.3)
RBC # BLD: 4.79 M/UL — SIGNIFICANT CHANGE UP (ref 3.8–5.2)
RBC # FLD: 14.7 % — HIGH (ref 10.3–14.5)
SODIUM SERPL-SCNC: 139 MMOL/L — SIGNIFICANT CHANGE UP (ref 135–145)
WBC # BLD: 9.34 K/UL — SIGNIFICANT CHANGE UP (ref 3.8–10.5)
WBC # FLD AUTO: 9.34 K/UL — SIGNIFICANT CHANGE UP (ref 3.8–10.5)

## 2025-02-04 PROCEDURE — 99233 SBSQ HOSP IP/OBS HIGH 50: CPT

## 2025-02-04 RX ORDER — ACETAMINOPHEN 160 MG/5ML
2 SUSPENSION ORAL
Qty: 40 | Refills: 0
Start: 2025-02-04 | End: 2025-02-08

## 2025-02-04 RX ORDER — TRAMADOL HYDROCHLORIDE 100 MG/1
1 TABLET, EXTENDED RELEASE ORAL
Qty: 20 | Refills: 0
Start: 2025-02-04 | End: 2025-02-08

## 2025-02-04 RX ADMIN — DULOXETINE 30 MILLIGRAM(S): 20 CAPSULE, DELAYED RELEASE ORAL at 11:15

## 2025-02-04 RX ADMIN — MYCOPHENOLATE MOFETIL 500 MILLIGRAM(S): 200 POWDER, FOR SUSPENSION ORAL at 19:06

## 2025-02-04 RX ADMIN — TRAMADOL HYDROCHLORIDE 50 MILLIGRAM(S): 100 TABLET, EXTENDED RELEASE ORAL at 17:08

## 2025-02-04 RX ADMIN — NIFEDIPINE 30 MILLIGRAM(S): 90 TABLET, FILM COATED, EXTENDED RELEASE ORAL at 06:18

## 2025-02-04 RX ADMIN — ENOXAPARIN SODIUM 40 MILLIGRAM(S): 100 INJECTION SUBCUTANEOUS at 08:33

## 2025-02-04 RX ADMIN — HYDROXYCHLOROQUINE SULFATE 200 MILLIGRAM(S): 200 TABLET, FILM COATED ORAL at 11:15

## 2025-02-04 RX ADMIN — TRAMADOL HYDROCHLORIDE 50 MILLIGRAM(S): 100 TABLET, EXTENDED RELEASE ORAL at 06:17

## 2025-02-04 RX ADMIN — TRAMADOL HYDROCHLORIDE 50 MILLIGRAM(S): 100 TABLET, EXTENDED RELEASE ORAL at 12:16

## 2025-02-04 RX ADMIN — TRAMADOL HYDROCHLORIDE 50 MILLIGRAM(S): 100 TABLET, EXTENDED RELEASE ORAL at 01:10

## 2025-02-04 RX ADMIN — TRAMADOL HYDROCHLORIDE 50 MILLIGRAM(S): 100 TABLET, EXTENDED RELEASE ORAL at 00:20

## 2025-02-04 RX ADMIN — MYCOPHENOLATE MOFETIL 500 MILLIGRAM(S): 200 POWDER, FOR SUSPENSION ORAL at 06:19

## 2025-02-04 RX ADMIN — TRAMADOL HYDROCHLORIDE 50 MILLIGRAM(S): 100 TABLET, EXTENDED RELEASE ORAL at 11:16

## 2025-02-04 RX ADMIN — TRAMADOL HYDROCHLORIDE 50 MILLIGRAM(S): 100 TABLET, EXTENDED RELEASE ORAL at 18:08

## 2025-02-04 RX ADMIN — LEVOTHYROXINE SODIUM 50 MICROGRAM(S): 25 TABLET ORAL at 06:18

## 2025-02-04 RX ADMIN — TRAMADOL HYDROCHLORIDE 50 MILLIGRAM(S): 100 TABLET, EXTENDED RELEASE ORAL at 06:50

## 2025-02-04 NOTE — DISCHARGE NOTE PROVIDER - NSDCMRMEDTOKEN_GEN_ALL_CORE_FT
I spoke to Raquel after reviewing her EMG of the upper limbs, which showed mild carpal tunnel syndrome. She does get a lot of tingling in her hands at night and will use wrist splints. She will let me know if the pain worsens or she develops hand weakness.     She has not had the MRI of the brain because she was informed it would cost her $500. She will reschedule the MRI when she can afford it. In the meantime she will get the blood test for vitamin B12, and will notify her of the result. I also told her that I will retire at the end of September.   acetaminophen 325 mg oral tablet: 2 tab(s) orally every 6 hours as needed for  pain  DULoxetine 30 mg oral delayed release capsule: 1 cap(s) orally once a day  hydroxychloroquine 200 mg oral tablet: 1 tab(s) orally once a day  ibandronate 150 mg oral tablet: 1 tab(s) orally once a month  levothyroxine 50 mcg (0.05 mg) oral tablet: 1 tab(s) orally once a day  mycophenolate mofetil 500 mg oral tablet: 1 tab(s) orally 2 times a day  NIFEdipine 30 mg oral tablet, extended release: 2 tab(s) orally once a day  TLSO Brace: To be worn during ambulation for T12 fracture  traMADol 25 mg oral tablet: 1 tab(s) orally every 6 hours MDD: 100mg  Vitamin D3 2000 intl units oral capsule: 1 cap(s) orally once a day   DULoxetine 30 mg oral delayed release capsule: 1 cap(s) orally once a day  hydroxychloroquine 200 mg oral tablet: 1 tab(s) orally once a day  ibandronate 150 mg oral tablet: 1 tab(s) orally once a month  levothyroxine 50 mcg (0.05 mg) oral tablet: 1 tab(s) orally once a day  mycophenolate mofetil 500 mg oral tablet: 1 tab(s) orally 2 times a day  NIFEdipine 30 mg oral tablet, extended release: 2 tab(s) orally once a day  polyethylene glycol 3350 oral powder for reconstitution: 17 gram(s) orally once a day As needed Constipation  senna leaf extract oral tablet: 2 tab(s) orally once a day (at bedtime)  TLSO Brace: To be worn during ambulation for T12 fracture  traMADol 25 mg oral tablet: 1 tab(s) orally every 6 hours MDD: 100mg  Vitamin D3 2000 intl units oral capsule: 1 cap(s) orally once a day

## 2025-02-04 NOTE — DISCHARGE NOTE PROVIDER - CARE PROVIDER_API CALL
Matt Saini  Orthopaedic Surgery  [address]  [place] [address]  Phone: [phone]  Fax: [phone]  Follow Up Time: 2 weeks    your primary care doctor,   Phone: (   )    -  Fax: (   )    -  Follow Up Time: 1 week

## 2025-02-04 NOTE — DISCHARGE NOTE PROVIDER - ATTENDING DISCHARGE PHYSICAL EXAMINATION:
Vitals: T: 98.1  P: 84  BP: 134/84  RR: 20  SpO2: 92%RA  GENERAL: NAD  HEENT: EOMI, hearing normal, conjunctiva and sclera clear  Chest: CTA bilaterally, no wheezing  CV: S1S2, RRR,   GI: soft, +BS, NT/ND  Musculoskeletal: no edema, TLSO brace on  Neuro:  CN II-XII intact, 5/5 strength of extremities  Psychiatric: affect nL, mood nL  Skin: warm and dry

## 2025-02-04 NOTE — DISCHARGE NOTE PROVIDER - HOSPITAL COURSE
ADMISSION DATE:  02-03-25    ---  FROM ADMISSION H+P:   HPI:  49-year-old female with PMHx of rheumatoid arthritis, scleroderma, hypothyroidism presents with status post fall on the ice today.  Pt states she fell this morning while going to her car after slipping on ice down the steps. She states she fell on her lower back. She was unable to get up at the time and ambulance was called.  She states she did not hit her head.  No loss of consciousness. the patient was given fentanyl 50 mcg prior to arrival.  She states the pain right now is mainly in her lower back and a little on her tailbone. She rates her pain 4/10. She states she has a little bit of a headache but from the way she is laying. She denies any dizziness or lightheadedness prior to the fall. She has not had any falls in the last 6 months. She has chronic pain in most of her joints due to her arthritis. No aggravating or alleviating factors otherwise noted.  No other acute injury or complaints.  No neck pain.  The patient complains of lower back pain.  No abdominal pain.  No numbness or tingling the arms or legs.  No focal weakness.  No radiation of pain to the arms or legs.     ED course  Vitals: T 98 , HR 78, /74 , RR 16 , SpO2 98 % on RA  Significant labs: WBC 14.36  Imaging: CT abd/pelvis: T12 vertebral body compression fracture with mild retropulsion indenting upon the spinal cord.   CT spine :Acute compression fracture involving the superior aspect of T12 with retropulsed fragment identified. This finding can be further evaluated with MRI of the lumbar spine, if there are no contraindications.  MR lumbar spine: Acute compression fracture of the superior T12 vertebral body. There is mild bony retropulsion which indents the ventral thecal sac without cord   compression  EKG: NSR  In ED patient given: percocet x1      (03 Feb 2025 16:34)      ---  HOSPITAL COURSE/PERTINENT LABS/PROCEDURES PERFORMED/PENDING TESTS:     patient was admitted after Fall.   s/p fall on ice found to have acute T12 compression fracture , likely mechanical fall, no LOC   -no loss of consciousness no head trauma   -CT spine :Acute compression fracture involving the superior aspect of T12 with retropulsed fragment identified. This finding can be further evaluated with MRI of the lumbar spine, if there are no contraindications.  -MR lumbar spine: Acute compression fracture of the superior T12 vertebral body. There is mild bony retropulsion which indents the ventral thecal sac without cord compression  -ortho consulted - recs TLSO brace, Pain control, no acute orthopedic surgical intervention at this time    Osteoporosis : patient received treatment out patient, will continue to follow up with PCP /Endocrinology     Patient is stable for discharge as per primary medical team and consultants.    PT consulted, recommends discharge ______    Patient showed improvement throughout hospitalization. Patient was seen and examined on day of discharge. Patient was medically optimized for discharge with close outpatient follow up.    ---  PATIENT CONDITION:  - stable    --  VITALS:   T(C): 36.3 (02-04-25 @ 11:28), Max: 37 (02-03-25 @ 18:00)  HR: 88 (02-04-25 @ 11:28) (67 - 88)  BP: 119/80 (02-04-25 @ 11:28) (101/68 - 119/80)  RR: 17 (02-04-25 @ 11:28) (16 - 18)  SpO2: 95% (02-04-25 @ 11:28) (93% - 99%)    PHYSICAL EXAM ON DAY OF DISCHARGE:  T(C): 36.3 (02-04-25 @ 11:28), Max: 37 (02-03-25 @ 18:00)  HR: 88 (02-04-25 @ 11:28) (67 - 88)  BP: 119/80 (02-04-25 @ 11:28) (101/68 - 119/80)  RR: 17 (02-04-25 @ 11:28) (16 - 18)  SpO2: 95% (02-04-25 @ 11:28) (93% - 99%)    CONSTITUTIONAL: Well groomed, no apparent distress  EYES: PERRLA and symmetric, EOMI, No conjunctival or scleral injection, non-icteric  ENMT: Oral mucosa with moist membranes. Normal dentition; no pharyngeal injection or exudates             NECK: Supple, symmetric and without tracheal deviation   RESP: No respiratory distress, no use of accessory muscles; CTA b/l, no WRR  CV: RRR, +S1S2.   GI: Soft, NT, ND, no rebound, no guarding; no palpable masses; no hepatosplenomegaly; no hernia palpated  LYMPH: No cervical LAD   MSK: moves all extremities   SKIN: No rashes or ulcers noted; no subcutaneous nodules or induration palpable  NEURO: CN II-XII intact; normal reflexes in upper and lower extremities, sensation intact in upper and lower extremities b/l to light touch   PSYCH: Appropriate insight/judgment; A+O x 3, mood and affect appropriate, recent/remote memory intact  ---  CONSULTANTS:   orthopedic        ADMISSION DATE:  02-03-25    ---  FROM ADMISSION H+P:   HPI:  49-year-old female with PMHx of rheumatoid arthritis, scleroderma, hypothyroidism presents with status post fall on the ice today.  Pt states she fell this morning while going to her car after slipping on ice down the steps. She states she fell on her lower back. She was unable to get up at the time and ambulance was called.  She states she did not hit her head.  No loss of consciousness. the patient was given fentanyl 50 mcg prior to arrival.  She states the pain right now is mainly in her lower back and a little on her tailbone. She rates her pain 4/10. She states she has a little bit of a headache but from the way she is laying. She denies any dizziness or lightheadedness prior to the fall. She has not had any falls in the last 6 months. She has chronic pain in most of her joints due to her arthritis. No aggravating or alleviating factors otherwise noted.  No other acute injury or complaints.  No neck pain.  The patient complains of lower back pain.  No abdominal pain.  No numbness or tingling the arms or legs.  No focal weakness.  No radiation of pain to the arms or legs.     ED course  Vitals: T 98 , HR 78, /74 , RR 16 , SpO2 98 % on RA  Significant labs: WBC 14.36  Imaging: CT abd/pelvis: T12 vertebral body compression fracture with mild retropulsion indenting upon the spinal cord.   CT spine :Acute compression fracture involving the superior aspect of T12 with retropulsed fragment identified. This finding can be further evaluated with MRI of the lumbar spine, if there are no contraindications.  MR lumbar spine: Acute compression fracture of the superior T12 vertebral body. There is mild bony retropulsion which indents the ventral thecal sac without cord   compression  EKG: NSR  In ED patient given: percocet x1      (03 Feb 2025 16:34)      ---  HOSPITAL COURSE/PERTINENT LABS/PROCEDURES PERFORMED/PENDING TESTS:     patient was admitted after Fall.   s/p fall on ice found to have acute T12 compression fracture , likely mechanical fall, no LOC   -no loss of consciousness no head trauma   -CT spine :Acute compression fracture involving the superior aspect of T12 with retropulsed fragment identified. This finding can be further evaluated with MRI of the lumbar spine, if there are no contraindications.  -MR lumbar spine: Acute compression fracture of the superior T12 vertebral body. There is mild bony retropulsion which indents the ventral thecal sac without cord compression  -ortho consulted - recs TLSO brace, Pain control, no acute orthopedic surgical intervention at this time    Osteoporosis : patient received treatment out patient, will continue to follow up with PCP /Endocrinology     Patient is stable for discharge as per primary medical team and consultants.    PT consulted, recommends discharge to [facility]    Patient showed improvement throughout hospitalization. Patient was seen and examined on day of discharge. Patient was medically optimized for discharge with close outpatient follow up.  On day of discharge patient is in no distress.  Afebrile and hemodynamically stable.     ---

## 2025-02-04 NOTE — DISCHARGE NOTE PROVIDER - PROVIDER TOKENS
PROVIDER:[TOKEN:[[id number]],FOLLOWUP:[2 weeks]],FREE:[LAST:[your primary care doctor],PHONE:[(   )    -],FAX:[(   )    -],FOLLOWUP:[1 week]]

## 2025-02-04 NOTE — DISCHARGE NOTE PROVIDER - NSDCCPCAREPLAN_GEN_ALL_CORE_FT
PRINCIPAL DISCHARGE DIAGNOSIS  Diagnosis: Compression fracture of thoracic vertebra  Assessment and Plan of Treatment: T12 compression fracture , back brace whe out of bed. fall precautions.   follow up ortho as discussed.      SECONDARY DISCHARGE DIAGNOSES  Diagnosis: Rheumatoid arthritis  Assessment and Plan of Treatment: continnue with your home regimen    Diagnosis: Unable to ambulate  Assessment and Plan of Treatment:     Diagnosis: Osteoporosis  Assessment and Plan of Treatment: continue home regimen. follow up PCP and Endocrinology as suggested.     PRINCIPAL DISCHARGE DIAGNOSIS  Diagnosis: Compression fracture of thoracic vertebra  Assessment and Plan of Treatment: T12 compression fracture , Continue TLSO brace when out of bed. fall precautions.  Continue analgesia.   follow up with orthopedic surgery.      SECONDARY DISCHARGE DIAGNOSES  Diagnosis: Unable to ambulate  Assessment and Plan of Treatment: You will be discharge to subacute rehab for physical therapy program.    Diagnosis: Rheumatoid arthritis  Assessment and Plan of Treatment: continnue with your home regimen    Diagnosis: Osteoporosis  Assessment and Plan of Treatment: continue home regimen. follow up PCP and Endocrinology as suggested.

## 2025-02-04 NOTE — DISCHARGE NOTE PROVIDER - NSDCFUSCHEDAPPT_GEN_ALL_CORE_FT
Maria Ines Ross  [facility]  [address]  Scheduled Appointment: 03/19/2025    Christiana Hermosillo  [facility]  INTMED OP [id number] [address] [facility]  Scheduled Appointment: 04/02/2025

## 2025-02-04 NOTE — SOCIAL WORK PROGRESS NOTE - NSSWPROGRESSNOTE_GEN_ALL_CORE
SWI met with pt and pt's family at bedside and discussed d/c plan. Pt understands that PT=[facility] and is in agreement with a discharge to rehab. SWI explained that auth needs to be obtained prior to d/c. SWI provided d/c folder and pt stated that their first choice is [facility]. SW explained that the pt's insurance may prove difficult to get accepted to [facility], pt in agreement to also send our referrals to facilities within a radius of her. SWI sent referral and will follow and remain available for any needs.  SWI met with pt and pt's family at bedside and discussed d/c planning. Pt understands that PT=[facility] and is in agreement with a discharge to rehab. SWI explained that auth needs to be obtained prior to d/c. SWI provided d/c folder and pt stated that their first choice is [facility]. SW explained that the pt's insurance may prove difficult to get accepted to [facility], pt in agreement to also send our referrals to facilities within a radius of her. SWI sent referral and will follow and remain available for any needs.  ANABELL met with pt and pt's family at bedside and discussed d/c planning. Pt understands that PT=[facility] and is in agreement with a discharge to rehab. ANABELL explained that auth needs to be obtained prior to d/c. SWI provided d/c folder and pt stated that their first choice is [facility]. SW explained that the pt's insurance may prove difficult to get accepted to [facility], pt in agreement to also send our referrals to facilities within a radius of her house. SWI sent referral and will follow and remain available for any needs.  Detail Level: Detailed Add 55731 Cpt? (Important Note: In 2017 The Use Of 04457 Is Being Tracked By Cms To Determine Future Global Period Reimbursement For Global Periods): no Wound Evaluated By: Dr. Sagrario Ferrell Additional Comments: Sutures trimmed

## 2025-02-04 NOTE — PROGRESS NOTE ADULT - PROBLEM SELECTOR PLAN 1
pt presenting s/p fall on ice found to have acute T12 compression fracture   -no loss of consciousness no head trauma   -CT spine :Acute compression fracture involving the superior aspect of T12 with retropulsed fragment identified. This finding can be further evaluated with MRI of the lumbar spine, if there are no contraindications.  -MR lumbar spine: Acute compression fracture of the superior T12 vertebral body. There is mild bony retropulsion which indents the ventral thecal sac without cord compression  -ortho consulted - recs TLSO brace, Pain control, no acute orthopedic surgical intervention at this time  -PT consulted- [facility]   -pain control tramadol q6 and tramadol

## 2025-02-05 LAB
ALBUMIN SERPL ELPH-MCNC: 3.6 G/DL — SIGNIFICANT CHANGE UP (ref 3.3–5)
ALP SERPL-CCNC: 87 U/L — SIGNIFICANT CHANGE UP (ref 40–120)
ALT FLD-CCNC: 15 U/L — SIGNIFICANT CHANGE UP (ref 12–78)
ANION GAP SERPL CALC-SCNC: 6 MMOL/L — SIGNIFICANT CHANGE UP (ref 5–17)
AST SERPL-CCNC: 16 U/L — SIGNIFICANT CHANGE UP (ref 15–37)
BASOPHILS # BLD AUTO: 0.03 K/UL — SIGNIFICANT CHANGE UP (ref 0–0.2)
BASOPHILS NFR BLD AUTO: 0.3 % — SIGNIFICANT CHANGE UP (ref 0–2)
BILIRUB SERPL-MCNC: 0.8 MG/DL — SIGNIFICANT CHANGE UP (ref 0.2–1.2)
BUN SERPL-MCNC: 13 MG/DL — SIGNIFICANT CHANGE UP (ref 7–23)
CALCIUM SERPL-MCNC: 9.3 MG/DL — SIGNIFICANT CHANGE UP (ref 8.5–10.1)
CHLORIDE SERPL-SCNC: 102 MMOL/L — SIGNIFICANT CHANGE UP (ref 96–108)
CO2 SERPL-SCNC: 26 MMOL/L — SIGNIFICANT CHANGE UP (ref 22–31)
CREAT SERPL-MCNC: 0.68 MG/DL — SIGNIFICANT CHANGE UP (ref 0.5–1.3)
EGFR: 107 ML/MIN/1.73M2 — SIGNIFICANT CHANGE UP
EOSINOPHIL # BLD AUTO: 0.17 K/UL — SIGNIFICANT CHANGE UP (ref 0–0.5)
EOSINOPHIL NFR BLD AUTO: 1.8 % — SIGNIFICANT CHANGE UP (ref 0–6)
GLUCOSE SERPL-MCNC: 125 MG/DL — HIGH (ref 70–99)
HCT VFR BLD CALC: 40.8 % — SIGNIFICANT CHANGE UP (ref 34.5–45)
HGB BLD-MCNC: 13.3 G/DL — SIGNIFICANT CHANGE UP (ref 11.5–15.5)
IMM GRANULOCYTES NFR BLD AUTO: 0.4 % — SIGNIFICANT CHANGE UP (ref 0–0.9)
LYMPHOCYTES # BLD AUTO: 2.68 K/UL — SIGNIFICANT CHANGE UP (ref 1–3.3)
LYMPHOCYTES # BLD AUTO: 27.8 % — SIGNIFICANT CHANGE UP (ref 13–44)
MCHC RBC-ENTMCNC: 26.2 PG — LOW (ref 27–34)
MCHC RBC-ENTMCNC: 32.6 G/DL — SIGNIFICANT CHANGE UP (ref 32–36)
MCV RBC AUTO: 80.3 FL — SIGNIFICANT CHANGE UP (ref 80–100)
MONOCYTES # BLD AUTO: 0.81 K/UL — SIGNIFICANT CHANGE UP (ref 0–0.9)
MONOCYTES NFR BLD AUTO: 8.4 % — SIGNIFICANT CHANGE UP (ref 2–14)
NEUTROPHILS # BLD AUTO: 5.9 K/UL — SIGNIFICANT CHANGE UP (ref 1.8–7.4)
NEUTROPHILS NFR BLD AUTO: 61.3 % — SIGNIFICANT CHANGE UP (ref 43–77)
NRBC # BLD: 0 /100 WBCS — SIGNIFICANT CHANGE UP (ref 0–0)
NRBC BLD-RTO: 0 /100 WBCS — SIGNIFICANT CHANGE UP (ref 0–0)
PLATELET # BLD AUTO: 176 K/UL — SIGNIFICANT CHANGE UP (ref 150–400)
POTASSIUM SERPL-MCNC: 3.8 MMOL/L — SIGNIFICANT CHANGE UP (ref 3.5–5.3)
POTASSIUM SERPL-SCNC: 3.8 MMOL/L — SIGNIFICANT CHANGE UP (ref 3.5–5.3)
PROT SERPL-MCNC: 7.1 G/DL — SIGNIFICANT CHANGE UP (ref 6–8.3)
RBC # BLD: 5.08 M/UL — SIGNIFICANT CHANGE UP (ref 3.8–5.2)
RBC # FLD: 14.6 % — HIGH (ref 10.3–14.5)
SODIUM SERPL-SCNC: 134 MMOL/L — LOW (ref 135–145)
WBC # BLD: 9.63 K/UL — SIGNIFICANT CHANGE UP (ref 3.8–10.5)
WBC # FLD AUTO: 9.63 K/UL — SIGNIFICANT CHANGE UP (ref 3.8–10.5)

## 2025-02-05 PROCEDURE — 99232 SBSQ HOSP IP/OBS MODERATE 35: CPT

## 2025-02-05 RX ORDER — SENNOSIDES 8.6 MG
2 TABLET ORAL AT BEDTIME
Refills: 0 | Status: DISCONTINUED | OUTPATIENT
Start: 2025-02-05 | End: 2025-02-06

## 2025-02-05 RX ORDER — POLYETHYLENE GLYCOL 3350 17 G/17G
17 POWDER, FOR SOLUTION ORAL DAILY
Refills: 0 | Status: DISCONTINUED | OUTPATIENT
Start: 2025-02-05 | End: 2025-02-06

## 2025-02-05 RX ADMIN — TRAMADOL HYDROCHLORIDE 50 MILLIGRAM(S): 100 TABLET, EXTENDED RELEASE ORAL at 11:03

## 2025-02-05 RX ADMIN — LEVOTHYROXINE SODIUM 50 MICROGRAM(S): 25 TABLET ORAL at 05:33

## 2025-02-05 RX ADMIN — ENOXAPARIN SODIUM 40 MILLIGRAM(S): 100 INJECTION SUBCUTANEOUS at 10:04

## 2025-02-05 RX ADMIN — MYCOPHENOLATE MOFETIL 500 MILLIGRAM(S): 200 POWDER, FOR SUSPENSION ORAL at 05:33

## 2025-02-05 RX ADMIN — MYCOPHENOLATE MOFETIL 500 MILLIGRAM(S): 200 POWDER, FOR SUSPENSION ORAL at 17:10

## 2025-02-05 RX ADMIN — DULOXETINE 30 MILLIGRAM(S): 20 CAPSULE, DELAYED RELEASE ORAL at 11:03

## 2025-02-05 RX ADMIN — TRAMADOL HYDROCHLORIDE 50 MILLIGRAM(S): 100 TABLET, EXTENDED RELEASE ORAL at 06:11

## 2025-02-05 RX ADMIN — TRAMADOL HYDROCHLORIDE 50 MILLIGRAM(S): 100 TABLET, EXTENDED RELEASE ORAL at 23:19

## 2025-02-05 RX ADMIN — TRAMADOL HYDROCHLORIDE 50 MILLIGRAM(S): 100 TABLET, EXTENDED RELEASE ORAL at 05:33

## 2025-02-05 RX ADMIN — TRAMADOL HYDROCHLORIDE 50 MILLIGRAM(S): 100 TABLET, EXTENDED RELEASE ORAL at 17:10

## 2025-02-05 RX ADMIN — Medication 2 TABLET(S): at 21:20

## 2025-02-05 RX ADMIN — TRAMADOL HYDROCHLORIDE 50 MILLIGRAM(S): 100 TABLET, EXTENDED RELEASE ORAL at 12:05

## 2025-02-05 RX ADMIN — TRAMADOL HYDROCHLORIDE 50 MILLIGRAM(S): 100 TABLET, EXTENDED RELEASE ORAL at 18:10

## 2025-02-05 RX ADMIN — HYDROXYCHLOROQUINE SULFATE 200 MILLIGRAM(S): 200 TABLET, FILM COATED ORAL at 11:03

## 2025-02-05 RX ADMIN — TRAMADOL HYDROCHLORIDE 50 MILLIGRAM(S): 100 TABLET, EXTENDED RELEASE ORAL at 23:45

## 2025-02-05 NOTE — PROGRESS NOTE ADULT - ASSESSMENT
49-year-old female with PMHx of rheumatoid arthritis, scleroderma, hypothyroidism presents with status post fall on the ice today. Admitted for T12 compression fracture and difficulty w/ ambulation.        Problem/Plan - 1:  ·  Problem: Thoracic compression fracture.   ·  Plan: pt presenting s/p fall on ice found to have acute T12 compression fracture   -no loss of consciousness no head trauma   -CT spine :Acute compression fracture involving the superior aspect of T12 with retropulsed fragment identified. This finding can be further evaluated with MRI of the lumbar spine, if there are no contraindications.  -MR lumbar spine: Acute compression fracture of the superior T12 vertebral body. There is mild bony retropulsion which indents the ventral thecal sac without cord compression  -ortho consulted - recs TLSO brace, Pain control, no acute orthopedic surgical intervention at this time  -PT consulted- [facility]   -pain control tylenol q6 PRN for mild pain, tramadol 50mg PO Q6h, and Dilaudid 0.5mg IV Q6h PRN for severe pain.     Problem/Plan - 2:  ·  Problem: Fall.   ·  Plan: likely mechanical , no LOC  fall precaution.   other management as above.     Problem/Plan - 3:  ·  Problem: Hypothyroidism.   ·  Plan: chronic   -c/w home levothyroxine 50mcg daily.     Problem/Plan - 4:  ·  Problem: Rheumatoid arthritis.   ·  Plan: chronic   -on home duloxetine 30mg daily, hydroxychloroquine 200 daily, mycophenolate mofetil 500mg BID, and ibandronate sodium 150 once monthly   -c/w home meds.     Problem/Plan - 5:  ·  Problem: Hypertension.   ·  Plan: #scleroderma  -chronic  -on nifedipine 30 2tabs daily however pt states she is only taking one tab daily   -c/w nifedipine 30mg daily w/ hold parameters   -monitor hemodynamics.     Problem/Plan - 6:  ·  Problem: Need for prophylactic measure.   ·  Plan: DVT ppx: lovenox 40 qd    Osteoporosis: ON treatment monthly , will f/u out patient.  
49-year-old female with PMHx of rheumatoid arthritis, scleroderma, hypothyroidism presents with status post fall on the ice today. Admitted for T12 compression fracture and difficulty w/ ambulation.

## 2025-02-05 NOTE — PROGRESS NOTE ADULT - SUBJECTIVE AND OBJECTIVE BOX
CHIEF COMPLAINT/INTERVAL HISTORY:  Pt. seen and evaluated for T12 compression fracture.  Pt. is in no distress.  Reports pain is well controlled with current analgesia.  Denies having bowel or bladder incontinence.      REVIEW OF SYSTEMS:  No fever, CP, SOB, or abdominal pain    Vital Signs Last 24 Hrs  T(C): 36.7 (05 Feb 2025 12:03), Max: 36.7 (05 Feb 2025 12:03)  T(F): 98 (05 Feb 2025 12:03), Max: 98 (05 Feb 2025 12:03)  HR: 77 (05 Feb 2025 12:03) (68 - 77)  BP: 102/71 (05 Feb 2025 12:03) (98/64 - 115/80)  BP(mean): --  RR: 20 (05 Feb 2025 12:03) (16 - 20)  SpO2: 92% (05 Feb 2025 12:03) (92% - 96%)    Parameters below as of 05 Feb 2025 12:03  Patient On (Oxygen Delivery Method): room air        PHYSICAL EXAM:  GENERAL: NAD  HEENT: EOMI, hearing normal, conjunctiva and sclera clear  Chest: CTA bilaterally, no wheezing  CV: S1S2, RRR,   GI: soft, +BS, NT/ND  Musculoskeletal: no edema  Neuro:  CN II-XII intact, 5/5 strength of extremities  Psychiatric: affect nL, mood nL  Skin: warm and dry    LABS:                        13.3   9.63  )-----------( 176      ( 05 Feb 2025 09:10 )             40.8     02-05    134[L]  |  102  |  13  ----------------------------<  125[H]  3.8   |  26  |  0.68    Ca    9.3      05 Feb 2025 09:10    TPro  7.1  /  Alb  3.6  /  TBili  0.8  /  DBili  x   /  AST  16  /  ALT  15  /  AlkPhos  87  02-05      Urinalysis Basic - ( 05 Feb 2025 09:10 )    Color: x / Appearance: x / SG: x / pH: x  Gluc: 125 mg/dL / Ketone: x  / Bili: x / Urobili: x   Blood: x / Protein: x / Nitrite: x   Leuk Esterase: x / RBC: x / WBC x   Sq Epi: x / Non Sq Epi: x / Bacteria: x        
Patient is a 49y old  Female who presents with a chief complaint of fall (04 Feb 2025 12:46)      Subjective:  INTERVAL HPI/OVERNIGHT EVENTS: Patient seen and examined at bedside.  Patient c/o back pain and hard to move around   MEDICATIONS  (STANDING):  acetaminophen   IVPB .. 1000 milliGRAM(s) IV Intermittent once  DULoxetine 30 milliGRAM(s) Oral daily  enoxaparin Injectable 40 milliGRAM(s) SubCutaneous every 24 hours  hydroxychloroquine 200 milliGRAM(s) Oral daily  levothyroxine 50 MICROGram(s) Oral daily  mycophenolate mofetil 500 milliGRAM(s) Oral two times a day  NIFEdipine XL 30 milliGRAM(s) Oral daily  traMADol 50 milliGRAM(s) Oral every 6 hours    MEDICATIONS  (PRN):  acetaminophen     Tablet .. 650 milliGRAM(s) Oral every 6 hours PRN Temp greater or equal to 38C (100.4F), Mild Pain (1 - 3)  aluminum hydroxide/magnesium hydroxide/simethicone Suspension 30 milliLiter(s) Oral every 4 hours PRN Dyspepsia  HYDROmorphone  Injectable 0.5 milliGRAM(s) IV Push every 6 hours PRN Severe Pain (7 - 10)  melatonin 3 milliGRAM(s) Oral at bedtime PRN Insomnia      Allergies    No Known Allergies    Intolerances        REVIEW OF SYSTEMS:  CONSTITUTIONAL: No fever or chills  HEENT:  No headache, no sore throat  RESPIRATORY: No cough or shortness of breath  CARDIOVASCULAR: No chest pain or palpitations  GASTROINTESTINAL: No abd pain, nausea, vomiting, or diarrhea      Objective:  Vital Signs Last 24 Hrs  T(C): 36.3 (04 Feb 2025 11:28), Max: 37 (03 Feb 2025 18:00)  T(F): 97.4 (04 Feb 2025 11:28), Max: 98.6 (03 Feb 2025 18:00)  HR: 90 (04 Feb 2025 13:45) (67 - 90)  BP: 114/67 (04 Feb 2025 13:45) (101/68 - 143/63)  BP(mean): --  RR: 17 (04 Feb 2025 11:28) (16 - 18)  SpO2: 96% (04 Feb 2025 13:45) (93% - 99%)    Parameters below as of 04 Feb 2025 13:45  Patient On (Oxygen Delivery Method): room air        GENERAL: NAD, lying in bed comfortably  HEAD:  Normocephalic  EYES:  conjunctiva and sclera clear  ENT: Moist mucous membranes  NECK: Supple  CHEST/LUNG: Clear to auscultation bilaterally; No rales or rhonchi; no wheezing. Unlabored respirations  HEART: Regular rate and rhythm; S1S2+  ABDOMEN: Bowel sounds present; Soft, Nontender, Nondistended.   EXTREMITIES:  + distal Peripheral Pulses;  No cyanosis, or edema  NERVOUS SYSTEM:  Alert & Oriented X3;  No gross focal deficits   MSK: moves all extremities, B/L LE 4/5 motor, sensation intact   SKIN: No rashes     LABS:                        12.4   9.34  )-----------( 177      ( 04 Feb 2025 07:33 )             38.5     04 Feb 2025 07:33    139    |  109    |  12     ----------------------------<  105    4.2     |  25     |  0.49     Ca    9.1        04 Feb 2025 07:33    TPro  6.9    /  Alb  3.6    /  TBili  0.7    /  DBili  x      /  AST  16     /  ALT  16     /  AlkPhos  82     04 Feb 2025 07:33    PT/INR - ( 03 Feb 2025 11:38 )   PT: 13.3 sec;   INR: 1.13 ratio         PTT - ( 03 Feb 2025 11:38 )  PTT:33.4 sec  Urinalysis Basic - ( 04 Feb 2025 07:33 )    Color: x / Appearance: x / SG: x / pH: x  Gluc: 105 mg/dL / Ketone: x  / Bili: x / Urobili: x   Blood: x / Protein: x / Nitrite: x   Leuk Esterase: x / RBC: x / WBC x   Sq Epi: x / Non Sq Epi: x / Bacteria: x      CAPILLARY BLOOD GLUCOSE              RADIOLOGY & ADDITIONAL TESTS:    Personally reviewed.     Consultant(s) Notes Reviewed:  [x] YES  [ ] NO    Plan of care discussed with patient ; all questions answered

## 2025-02-05 NOTE — SOCIAL WORK PROGRESS NOTE - NSSWPROGRESSNOTE_GEN_ALL_CORE
SW met with pt at bedside to discuss [facility] choice. Pt is accepting bed @ [facility] in [place]. SW submitted for auth. Pt medically ready for d/c once auth obtained. SW to follow and remain available for any needs.

## 2025-02-06 ENCOUNTER — TRANSCRIPTION ENCOUNTER (OUTPATIENT)
Age: 50
End: 2025-02-06

## 2025-02-06 VITALS
SYSTOLIC BLOOD PRESSURE: 134 MMHG | TEMPERATURE: 98 F | HEART RATE: 84 BPM | OXYGEN SATURATION: 92 % | DIASTOLIC BLOOD PRESSURE: 84 MMHG | RESPIRATION RATE: 20 BRPM

## 2025-02-06 LAB
ANION GAP SERPL CALC-SCNC: 4 MMOL/L — LOW (ref 5–17)
BASOPHILS # BLD AUTO: 0.05 K/UL — SIGNIFICANT CHANGE UP (ref 0–0.2)
BASOPHILS NFR BLD AUTO: 0.6 % — SIGNIFICANT CHANGE UP (ref 0–2)
BUN SERPL-MCNC: 17 MG/DL — SIGNIFICANT CHANGE UP (ref 7–23)
CALCIUM SERPL-MCNC: 9.5 MG/DL — SIGNIFICANT CHANGE UP (ref 8.5–10.1)
CHLORIDE SERPL-SCNC: 104 MMOL/L — SIGNIFICANT CHANGE UP (ref 96–108)
CO2 SERPL-SCNC: 31 MMOL/L — SIGNIFICANT CHANGE UP (ref 22–31)
CREAT SERPL-MCNC: 0.53 MG/DL — SIGNIFICANT CHANGE UP (ref 0.5–1.3)
EGFR: 114 ML/MIN/1.73M2 — SIGNIFICANT CHANGE UP
EOSINOPHIL # BLD AUTO: 0.21 K/UL — SIGNIFICANT CHANGE UP (ref 0–0.5)
EOSINOPHIL NFR BLD AUTO: 2.5 % — SIGNIFICANT CHANGE UP (ref 0–6)
GLUCOSE SERPL-MCNC: 95 MG/DL — SIGNIFICANT CHANGE UP (ref 70–99)
HCT VFR BLD CALC: 37.7 % — SIGNIFICANT CHANGE UP (ref 34.5–45)
HGB BLD-MCNC: 12.4 G/DL — SIGNIFICANT CHANGE UP (ref 11.5–15.5)
IMM GRANULOCYTES NFR BLD AUTO: 0.4 % — SIGNIFICANT CHANGE UP (ref 0–0.9)
LYMPHOCYTES # BLD AUTO: 2.44 K/UL — SIGNIFICANT CHANGE UP (ref 1–3.3)
LYMPHOCYTES # BLD AUTO: 29.3 % — SIGNIFICANT CHANGE UP (ref 13–44)
MAGNESIUM SERPL-MCNC: 2.2 MG/DL — SIGNIFICANT CHANGE UP (ref 1.6–2.6)
MCHC RBC-ENTMCNC: 26.7 PG — LOW (ref 27–34)
MCHC RBC-ENTMCNC: 32.9 G/DL — SIGNIFICANT CHANGE UP (ref 32–36)
MCV RBC AUTO: 81.1 FL — SIGNIFICANT CHANGE UP (ref 80–100)
MONOCYTES # BLD AUTO: 0.97 K/UL — HIGH (ref 0–0.9)
MONOCYTES NFR BLD AUTO: 11.7 % — SIGNIFICANT CHANGE UP (ref 2–14)
NEUTROPHILS # BLD AUTO: 4.62 K/UL — SIGNIFICANT CHANGE UP (ref 1.8–7.4)
NEUTROPHILS NFR BLD AUTO: 55.5 % — SIGNIFICANT CHANGE UP (ref 43–77)
NRBC # BLD: 0 /100 WBCS — SIGNIFICANT CHANGE UP (ref 0–0)
NRBC BLD-RTO: 0 /100 WBCS — SIGNIFICANT CHANGE UP (ref 0–0)
PLATELET # BLD AUTO: 159 K/UL — SIGNIFICANT CHANGE UP (ref 150–400)
POTASSIUM SERPL-MCNC: 4.7 MMOL/L — SIGNIFICANT CHANGE UP (ref 3.5–5.3)
POTASSIUM SERPL-SCNC: 4.7 MMOL/L — SIGNIFICANT CHANGE UP (ref 3.5–5.3)
RBC # BLD: 4.65 M/UL — SIGNIFICANT CHANGE UP (ref 3.8–5.2)
RBC # FLD: 14.5 % — SIGNIFICANT CHANGE UP (ref 10.3–14.5)
SODIUM SERPL-SCNC: 139 MMOL/L — SIGNIFICANT CHANGE UP (ref 135–145)
WBC # BLD: 8.32 K/UL — SIGNIFICANT CHANGE UP (ref 3.8–10.5)
WBC # FLD AUTO: 8.32 K/UL — SIGNIFICANT CHANGE UP (ref 3.8–10.5)

## 2025-02-06 PROCEDURE — 86900 BLOOD TYPING SEROLOGIC ABO: CPT

## 2025-02-06 PROCEDURE — 85730 THROMBOPLASTIN TIME PARTIAL: CPT

## 2025-02-06 PROCEDURE — 74176 CT ABD & PELVIS W/O CONTRAST: CPT | Mod: MC

## 2025-02-06 PROCEDURE — 99285 EMERGENCY DEPT VISIT HI MDM: CPT

## 2025-02-06 PROCEDURE — 72148 MRI LUMBAR SPINE W/O DYE: CPT | Mod: MC

## 2025-02-06 PROCEDURE — 99239 HOSP IP/OBS DSCHRG MGMT >30: CPT

## 2025-02-06 PROCEDURE — 86850 RBC ANTIBODY SCREEN: CPT

## 2025-02-06 PROCEDURE — 97162 PT EVAL MOD COMPLEX 30 MIN: CPT

## 2025-02-06 PROCEDURE — 97530 THERAPEUTIC ACTIVITIES: CPT

## 2025-02-06 PROCEDURE — 85025 COMPLETE CBC W/AUTO DIFF WBC: CPT

## 2025-02-06 PROCEDURE — 71045 X-RAY EXAM CHEST 1 VIEW: CPT

## 2025-02-06 PROCEDURE — 85027 COMPLETE CBC AUTOMATED: CPT

## 2025-02-06 PROCEDURE — 86901 BLOOD TYPING SEROLOGIC RH(D): CPT

## 2025-02-06 PROCEDURE — 36415 COLL VENOUS BLD VENIPUNCTURE: CPT

## 2025-02-06 PROCEDURE — 85610 PROTHROMBIN TIME: CPT

## 2025-02-06 PROCEDURE — 97116 GAIT TRAINING THERAPY: CPT

## 2025-02-06 PROCEDURE — 80053 COMPREHEN METABOLIC PANEL: CPT

## 2025-02-06 PROCEDURE — 83735 ASSAY OF MAGNESIUM: CPT

## 2025-02-06 PROCEDURE — 80048 BASIC METABOLIC PNL TOTAL CA: CPT

## 2025-02-06 PROCEDURE — 93005 ELECTROCARDIOGRAM TRACING: CPT

## 2025-02-06 RX ORDER — ACETAMINOPHEN 160 MG/5ML
2 SUSPENSION ORAL
Qty: 40 | Refills: 0
Start: 2025-02-06 | End: 2025-02-10

## 2025-02-06 RX ORDER — POLYETHYLENE GLYCOL 3350 17 G/17G
17 POWDER, FOR SOLUTION ORAL
Qty: 0 | Refills: 0 | DISCHARGE
Start: 2025-02-06

## 2025-02-06 RX ORDER — SENNOSIDES 8.6 MG
2 TABLET ORAL
Qty: 0 | Refills: 0 | DISCHARGE
Start: 2025-02-06

## 2025-02-06 RX ADMIN — ENOXAPARIN SODIUM 40 MILLIGRAM(S): 100 INJECTION SUBCUTANEOUS at 08:46

## 2025-02-06 RX ADMIN — MYCOPHENOLATE MOFETIL 500 MILLIGRAM(S): 200 POWDER, FOR SUSPENSION ORAL at 05:37

## 2025-02-06 RX ADMIN — LEVOTHYROXINE SODIUM 50 MICROGRAM(S): 25 TABLET ORAL at 05:37

## 2025-02-06 RX ADMIN — DULOXETINE 30 MILLIGRAM(S): 20 CAPSULE, DELAYED RELEASE ORAL at 11:01

## 2025-02-06 RX ADMIN — TRAMADOL HYDROCHLORIDE 50 MILLIGRAM(S): 100 TABLET, EXTENDED RELEASE ORAL at 12:10

## 2025-02-06 RX ADMIN — TRAMADOL HYDROCHLORIDE 50 MILLIGRAM(S): 100 TABLET, EXTENDED RELEASE ORAL at 05:38

## 2025-02-06 RX ADMIN — POLYETHYLENE GLYCOL 3350 17 GRAM(S): 17 POWDER, FOR SOLUTION ORAL at 11:04

## 2025-02-06 RX ADMIN — HYDROXYCHLOROQUINE SULFATE 200 MILLIGRAM(S): 200 TABLET, FILM COATED ORAL at 11:02

## 2025-02-06 RX ADMIN — TRAMADOL HYDROCHLORIDE 50 MILLIGRAM(S): 100 TABLET, EXTENDED RELEASE ORAL at 11:02

## 2025-02-06 NOTE — DISCHARGE NOTE NURSING/CASE MANAGEMENT/SOCIAL WORK - NSDCVIVACCINE_GEN_ALL_CORE_FT
influenza, injectable, quadrivalent, preservative free; 21-Oct-2018 12:02; Michaela Randall (JOVAN); [employer]; [id number] (Exp. Date: 30-Jun-2019); IntraMuscular; Deltoid Left.; 0.5 milliLiter(s); VIS (VIS Published: 07-Aug-2015, VIS Presented: 21-Oct-2018);

## 2025-02-06 NOTE — DISCHARGE NOTE NURSING/CASE MANAGEMENT/SOCIAL WORK - FINANCIAL ASSISTANCE
[facility] provides services at a reduced cost to those who are determined to be eligible through [facility]’s financial assistance program. Information regarding [facility]’s financial assistance program can be found by going to [email or web address] or by calling [phone].

## 2025-02-06 NOTE — DISCHARGE NOTE NURSING/CASE MANAGEMENT/SOCIAL WORK - PATIENT PORTAL LINK FT
You can access the FollowMyHealth Patient Portal offered by [facility] by registering at the following website: [email or web address]. By joining [facility]’s FollowMyHealth portal, you will also be able to view your health information using other applications (apps) compatible with our system.

## 2025-02-06 NOTE — DISCHARGE NOTE NURSING/CASE MANAGEMENT/SOCIAL WORK - NSDCPEFALRISK_GEN_ALL_CORE
For information on Fall & Injury Prevention, visit: [email or web address] OR  [email or web address] OR  https://www.cdc.gov/steadi/patient.html

## 2025-02-06 NOTE — SOCIAL WORK PROGRESS NOTE - NSSWPROGRESSNOTE_GEN_ALL_CORE
Kasie received #[id number] 2/5-2/17, pt to be DC to [place] for subacute rehab today at 12:30 pm via lette. Pt aware and in agreement with plan for DC. SW to remain available as needed to ensure safe transition upon DC.

## 2025-03-05 NOTE — PATIENT PROFILE ADULT - HAVE YOU EXPERIENCED A TRAUMATIC EVENT?
3/5/2025 2:08 PM  *  Unable to Reach Date/Time:  3/5/2025 2:08 PM  LPN attempted to reach patient by telephone regarding yellow alert in remote patient monitoring program. Weight and temperature reading have not been updated x 2 days and pulse ox reading has not been updated x 5 days. Left HIPAA compliant message.  Updated BP reading noted in HRS and is within RPM parameters.                     
no

## 2025-03-19 ENCOUNTER — APPOINTMENT (OUTPATIENT)
Dept: RHEUMATOLOGY | Facility: CLINIC | Age: 50
End: 2025-03-19

## 2025-04-02 ENCOUNTER — APPOINTMENT (OUTPATIENT)
Dept: INTERNAL MEDICINE | Facility: CLINIC | Age: 50
End: 2025-04-02

## 2025-05-20 ENCOUNTER — RX RENEWAL (OUTPATIENT)
Age: 50
End: 2025-05-20

## 2025-06-10 ENCOUNTER — APPOINTMENT (OUTPATIENT)
Dept: ENDOCRINOLOGY | Facility: CLINIC | Age: 50
End: 2025-06-10
Payer: MEDICAID

## 2025-06-10 ENCOUNTER — OUTPATIENT (OUTPATIENT)
Dept: OUTPATIENT SERVICES | Facility: HOSPITAL | Age: 50
LOS: 1 days | End: 2025-06-10

## 2025-06-10 VITALS
HEART RATE: 92 BPM | OXYGEN SATURATION: 97 % | HEIGHT: 59 IN | BODY MASS INDEX: 25.2 KG/M2 | WEIGHT: 125 LBS | DIASTOLIC BLOOD PRESSURE: 86 MMHG | SYSTOLIC BLOOD PRESSURE: 112 MMHG

## 2025-06-10 DIAGNOSIS — H53.8 OTHER VISUAL DISTURBANCES: ICD-10-CM

## 2025-06-10 DIAGNOSIS — M81.0 AGE-RELATED OSTEOPOROSIS WITHOUT CURRENT PATHOLOGICAL FRACTURE: ICD-10-CM

## 2025-06-10 DIAGNOSIS — Z98.890 OTHER SPECIFIED POSTPROCEDURAL STATES: Chronic | ICD-10-CM

## 2025-06-10 DIAGNOSIS — E03.9 HYPOTHYROIDISM, UNSPECIFIED: ICD-10-CM

## 2025-06-10 DIAGNOSIS — Z98.89 OTHER SPECIFIED POSTPROCEDURAL STATES: Chronic | ICD-10-CM

## 2025-06-10 PROCEDURE — 99204 OFFICE O/P NEW MOD 45 MIN: CPT | Mod: 25

## 2025-06-13 LAB
T4 FREE SERPL-MCNC: 1.2 NG/DL
THYROGLOB AB SERPL-ACNC: 25.1 IU/ML
THYROPEROXIDASE AB SERPL IA-ACNC: 108 IU/ML
TSH SERPL-ACNC: 18.1 UIU/ML

## 2025-08-19 ENCOUNTER — RX RENEWAL (OUTPATIENT)
Age: 50
End: 2025-08-19